# Patient Record
Sex: MALE | Race: OTHER | HISPANIC OR LATINO | Employment: UNEMPLOYED | URBAN - METROPOLITAN AREA
[De-identification: names, ages, dates, MRNs, and addresses within clinical notes are randomized per-mention and may not be internally consistent; named-entity substitution may affect disease eponyms.]

---
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
## 2021-01-04 DIAGNOSIS — Z23 NEED FOR INFLUENZA VACCINATION: Primary | ICD-10-CM

## 2021-01-05 ENCOUNTER — OFFICE VISIT (OUTPATIENT)
Dept: FAMILY MEDICINE CLINIC | Facility: CLINIC | Age: 21
End: 2021-01-05
Payer: COMMERCIAL

## 2021-01-05 VITALS
OXYGEN SATURATION: 99 % | SYSTOLIC BLOOD PRESSURE: 110 MMHG | HEIGHT: 68 IN | HEART RATE: 66 BPM | DIASTOLIC BLOOD PRESSURE: 70 MMHG | WEIGHT: 116.8 LBS | BODY MASS INDEX: 17.7 KG/M2 | TEMPERATURE: 97.4 F

## 2021-01-05 DIAGNOSIS — Z00.00 HEALTH MAINTENANCE EXAMINATION: Primary | ICD-10-CM

## 2021-01-05 DIAGNOSIS — K29.50 CHRONIC GASTRITIS WITHOUT BLEEDING, UNSPECIFIED GASTRITIS TYPE: ICD-10-CM

## 2021-01-05 PROCEDURE — 1036F TOBACCO NON-USER: CPT | Performed by: NURSE PRACTITIONER

## 2021-01-05 PROCEDURE — 3725F SCREEN DEPRESSION PERFORMED: CPT | Performed by: NURSE PRACTITIONER

## 2021-01-05 PROCEDURE — 3008F BODY MASS INDEX DOCD: CPT | Performed by: NURSE PRACTITIONER

## 2021-01-05 PROCEDURE — 99395 PREV VISIT EST AGE 18-39: CPT | Performed by: NURSE PRACTITIONER

## 2021-01-05 RX ORDER — OMEPRAZOLE 20 MG/1
20 CAPSULE, DELAYED RELEASE ORAL
Qty: 90 CAPSULE | Refills: 3 | Status: SHIPPED | OUTPATIENT
Start: 2021-01-05 | End: 2021-03-05 | Stop reason: SDUPTHER

## 2021-01-05 NOTE — PROGRESS NOTES
Subjective     Jenny Rosario is a 24 y o   male and is here for routine health maintenance  The patient reports problems - having stomach  problems that come and go   ON bad days states lack of appetitie   States certain foods this more with causing abdominal pain  Occasional diarrhea   ON a bad day eating will make it worse  ON good days can eat normal   Happening several times a week  All this started off and on for 1 year  Becoming more frequent  Having issues of clearing throat at night an dfirst thing in am   History of Present Illness     HPI  See above   Well Adult Physical   Patient here for a comprehensive physical exam       Diet and Physical Activity  Diet: poor diet  Weight concerns: None, patient's BMI is between 18 5-24 9  Exercise: never      Depression Screen  PHQ-9 Depression Screening    PHQ-9:   Frequency of the following problems over the past two weeks:      Little interest or pleasure in doing things: 0 - not at all  Feeling down, depressed, or hopeless: 0 - not at all  PHQ-2 Score: 0          General Health  Hearing: Normal:  bilateral  Vision: no vision problems  Dental: no dental visits for >1 year              Smoker no   Annual screening with low-dose helical computed tomography (CT) for patients age 54 to 76 years with history of smoking at least 30 pack-years and, if a former smoker, had quit within the previous 15 years      The following portions of the patient's history were reviewed and updated as appropriate: allergies, current medications, past family history, past medical history, past social history, past surgical history and problem list     Review of Systems     Review of Systems   Constitutional: Negative for activity change, appetite change, fatigue, fever and unexpected weight change  HENT: Negative for congestion, dental problem, postnasal drip, rhinorrhea and sneezing  Eyes: Negative for discharge and visual disturbance     Respiratory: Negative for cough, chest tightness and wheezing  Cardiovascular: Negative for chest pain  Gastrointestinal: Positive for abdominal pain, diarrhea and nausea  Negative for constipation and vomiting  Endocrine: Negative for polydipsia and polyuria  Genitourinary: Negative for dysuria, frequency and urgency  Musculoskeletal: Negative for arthralgias  Skin: Negative for rash  Allergic/Immunologic: Negative for environmental allergies and food allergies  Neurological: Negative for dizziness, light-headedness and headaches  Hematological: Negative for adenopathy  Psychiatric/Behavioral: Negative for behavioral problems and sleep disturbance  Past Medical History     History reviewed  No pertinent past medical history  Past Surgical History     History reviewed  No pertinent surgical history      Social History     Social History     Socioeconomic History    Marital status: Single     Spouse name: None    Number of children: None    Years of education: None    Highest education level: None   Occupational History    None   Social Needs    Financial resource strain: None    Food insecurity     Worry: None     Inability: None    Transportation needs     Medical: None     Non-medical: None   Tobacco Use    Smoking status: Never Smoker    Smokeless tobacco: Never Used   Substance and Sexual Activity    Alcohol use: Not Currently    Drug use: Never    Sexual activity: Not Currently   Lifestyle    Physical activity     Days per week: None     Minutes per session: None    Stress: None   Relationships    Social connections     Talks on phone: None     Gets together: None     Attends Yazidi service: None     Active member of club or organization: None     Attends meetings of clubs or organizations: None     Relationship status: None    Intimate partner violence     Fear of current or ex partner: None     Emotionally abused: None     Physically abused: None     Forced sexual activity: None   Other Topics Concern    None   Social History Narrative    None       Family History     History reviewed  No pertinent family history  Current Medications       Current Outpatient Medications:     acetaminophen (TYLENOL) 500 mg tablet, Take 1 tablet (500 mg total) by mouth every 6 (six) hours as needed for mild pain, Disp: 30 tablet, Rfl: 2     Allergies     No Known Allergies    Objective     /70 (BP Location: Left arm, Patient Position: Sitting, Cuff Size: Standard)   Pulse 66   Temp (!) 97 4 °F (36 3 °C) (Temporal)   Ht 5' 8" (1 727 m)   Wt 53 kg (116 lb 12 8 oz)   SpO2 99%   BMI 17 76 kg/m²      Physical Exam  Constitutional:       Appearance: Normal appearance  He is well-developed and normal weight  HENT:      Head: Normocephalic  Right Ear: External ear normal       Left Ear: External ear normal    Eyes:      General:         Right eye: No discharge  Left eye: No discharge  Conjunctiva/sclera: Conjunctivae normal       Pupils: Pupils are equal, round, and reactive to light  Neck:      Musculoskeletal: Normal range of motion and neck supple  Thyroid: No thyromegaly  Cardiovascular:      Rate and Rhythm: Normal rate and regular rhythm  Heart sounds: Normal heart sounds  No murmur  Pulmonary:      Effort: Pulmonary effort is normal       Breath sounds: Normal breath sounds  Abdominal:      General: Bowel sounds are normal       Palpations: Abdomen is soft  Tenderness: There is no abdominal tenderness  Genitourinary:     Penis: Normal     Musculoskeletal: Normal range of motion  Lymphadenopathy:      Cervical: No cervical adenopathy  Skin:     General: Skin is warm  Findings: No rash  Neurological:      Mental Status: He is alert and oriented to person, place, and time     Psychiatric:         Behavior: Behavior normal            No exam data present    Health Maintenance     Health Maintenance   Topic Date Due    HIV Screening  07/13/2016    BMI: Followup Plan  07/13/2019    Depression Screening PHQ  04/27/2021    Influenza Vaccine (1) 06/30/2021 (Originally 9/1/2020)    Annual Physical  04/27/2021    BMI: Adult  01/05/2022    DTaP,Tdap,and Td Vaccines (6 - Td) 10/24/2022    Pneumococcal Vaccine: Pediatrics (0 to 5 Years) and At-Risk Patients (6 to 59 Years)  Completed    HIB Vaccine  Completed    Hepatitis B Vaccine  Completed    IPV Vaccine  Completed    Hepatitis A Vaccine  Completed    HPV Vaccine  Completed    Meningococcal ACWY Vaccine  Aged Out     Immunization History   Administered Date(s) Administered    DTaP 2001, 05/01/2002, 11/12/2002, 09/12/2005    HPV 05/04/2015    HPV Quadrivalent 05/04/2015    HPV9 01/14/2014    Hep A, ped/adol, 2 dose 11/30/2006, 06/12/2007    Hep B, Adolescent or Pediatric 2001, 02/07/2002    Hep B, adult 2001    Hepatitis A 11/30/2006, 06/12/2007    HiB 05/01/2002, 11/12/2002    Hib (PRP-T) 2001    IPV 2001, 08/26/2002, 09/12/2005    MMR 08/26/2002, 08/08/2005    Meningococcal MCV4P 10/24/2012    Pneumococcal Conjugate 13-Valent 2001, 05/01/2002, 11/12/2002    Tdap 10/24/2012    Varicella 08/26/2002, 09/06/2007       Assessment/Plan     There are no diagnoses linked to this encounter  1  Healthy male exam   2  Patient Counseling:   · Nutrition: Stressed importance of a well balanced diet, moderation of sodium/saturated fat, caloric balance and sufficient intake of fiber  · Exercise: Stressed the importance of regular exercise with a goal of 150 minutes per week  · Dental Health: Discussed daily flossing and brushing and regular dental visits     · Immunizations reviewed  · Discussed benefits of screening   · Discussed the patient's BMI with him  The BMI is in the acceptable range   ·   Follow up as needed for acute illness  BMI Counseling: Body mass index is 17 76 kg/m²  The BMI is below normal  Dietary education for weight gain was provided  Patient Instructions     Diet for Stomach Ulcers and Gastritis   AMBULATORY CARE:   A diet for stomach ulcers and gastritis  is a meal plan that limits foods that irritate your stomach  Certain foods may worsen symptoms such as stomach pain, bloating, heartburn, or indigestion  Foods to limit or avoid:  You may need to avoid acidic, spicy, or high-fat foods  Not all foods affect everyone the same way  You will need to learn which foods worsen your symptoms and limit those foods  The following are some foods that may worsen ulcer or gastritis symptoms:  · Beverages:      ? Whole milk and chocolate milk    ? Hot cocoa and cola    ? Any beverage with caffeine    ? Regular and decaffeinated coffee    ? Peppermint and spearmint tea    ? Green and black tea, with or without caffeine    ? Orange and grapefruit juices    ? Drinks that contain alcohol    · Spices and seasonings:      ? Black and red pepper    ? Chili powder    ? Mustard seed and nutmeg    · Other foods:      ? Dairy foods made from whole milk or cream    ? Chocolate    ? Spicy or strongly flavored cheeses, such as jalapeno or black pepper    ? Highly seasoned, high-fat meats, such as sausage, salami, martinez, ham, and cold cuts    ? Hot chiles and peppers    ? Tomato products, such as tomato paste, tomato sauce, or tomato juice    Foods to include:  Eat a variety of healthy foods from all the food groups  Eat fruits, vegetables, whole grains, and fat-free or low-fat dairy foods  Whole grains include whole-wheat breads, cereals, pasta, and brown rice  Choose lean meats, poultry (chicken and turkey), fish, beans, eggs, and nuts  A healthy meal plan is low in unhealthy fats, salt, and added sugar  Healthy fats include olive oil and canola oil  Ask your dietitian for more information about a healthy diet  Other helpful guidelines:   · Do not eat right before bedtime  Stop eating at least 2 hours before bedtime  · Eat small, frequent meals    Your stomach may tolerate small, frequent meals better than large meals  © Copyright [address] Information is for End User's use only and may not be sold, redistributed or otherwise used for commercial purposes  All illustrations and images included in CareNotes® are the copyrighted property of [employer]  or Mumtaz Kumar  The above information is an  only  It is not intended as medical advice for individual conditions or treatments  Talk to your doctor, nurse or pharmacist before following any medical regimen to see if it is safe and effective for you        Vijay Reynaga

## 2021-01-05 NOTE — PATIENT INSTRUCTIONS
Diet for Stomach Ulcers and Gastritis   AMBULATORY CARE:   A diet for stomach ulcers and gastritis  is a meal plan that limits foods that irritate your stomach  Certain foods may worsen symptoms such as stomach pain, bloating, heartburn, or indigestion  Foods to limit or avoid:  You may need to avoid acidic, spicy, or high-fat foods  Not all foods affect everyone the same way  You will need to learn which foods worsen your symptoms and limit those foods  The following are some foods that may worsen ulcer or gastritis symptoms:  · Beverages:      ? Whole milk and chocolate milk    ? Hot cocoa and cola    ? Any beverage with caffeine    ? Regular and decaffeinated coffee    ? Peppermint and spearmint tea    ? Green and black tea, with or without caffeine    ? Orange and grapefruit juices    ? Drinks that contain alcohol    · Spices and seasonings:      ? Black and red pepper    ? Chili powder    ? Mustard seed and nutmeg    · Other foods:      ? Dairy foods made from whole milk or cream    ? Chocolate    ? Spicy or strongly flavored cheeses, such as jalapeno or black pepper    ? Highly seasoned, high-fat meats, such as sausage, salami, martinez, ham, and cold cuts    ? Hot chiles and peppers    ? Tomato products, such as tomato paste, tomato sauce, or tomato juice    Foods to include:  Eat a variety of healthy foods from all the food groups  Eat fruits, vegetables, whole grains, and fat-free or low-fat dairy foods  Whole grains include whole-wheat breads, cereals, pasta, and brown rice  Choose lean meats, poultry (chicken and turkey), fish, beans, eggs, and nuts  A healthy meal plan is low in unhealthy fats, salt, and added sugar  Healthy fats include olive oil and canola oil  Ask your dietitian for more information about a healthy diet  Other helpful guidelines:   · Do not eat right before bedtime  Stop eating at least 2 hours before bedtime  · Eat small, frequent meals    Your stomach may tolerate small, frequent meals better than large meals  © Copyright [address] Information is for End User's use only and may not be sold, redistributed or otherwise used for commercial purposes  All illustrations and images included in CareNotes® are the copyrighted property of [employer]  or Mumtaz Kumar  The above information is an  only  It is not intended as medical advice for individual conditions or treatments  Talk to your doctor, nurse or pharmacist before following any medical regimen to see if it is safe and effective for you

## 2021-03-05 ENCOUNTER — OFFICE VISIT (OUTPATIENT)
Dept: FAMILY MEDICINE CLINIC | Facility: CLINIC | Age: 21
End: 2021-03-05
Payer: COMMERCIAL

## 2021-03-05 VITALS
BODY MASS INDEX: 16.6 KG/M2 | DIASTOLIC BLOOD PRESSURE: 70 MMHG | HEART RATE: 77 BPM | WEIGHT: 109.2 LBS | TEMPERATURE: 97.5 F | SYSTOLIC BLOOD PRESSURE: 110 MMHG | OXYGEN SATURATION: 98 % | RESPIRATION RATE: 18 BRPM

## 2021-03-05 DIAGNOSIS — K29.50 CHRONIC GASTRITIS WITHOUT BLEEDING, UNSPECIFIED GASTRITIS TYPE: ICD-10-CM

## 2021-03-05 DIAGNOSIS — K21.9 GASTROESOPHAGEAL REFLUX DISEASE WITHOUT ESOPHAGITIS: Primary | ICD-10-CM

## 2021-03-05 PROCEDURE — 3725F SCREEN DEPRESSION PERFORMED: CPT | Performed by: NURSE PRACTITIONER

## 2021-03-05 PROCEDURE — 3008F BODY MASS INDEX DOCD: CPT | Performed by: NURSE PRACTITIONER

## 2021-03-05 PROCEDURE — 1036F TOBACCO NON-USER: CPT | Performed by: NURSE PRACTITIONER

## 2021-03-05 PROCEDURE — 99213 OFFICE O/P EST LOW 20 MIN: CPT | Performed by: NURSE PRACTITIONER

## 2021-03-05 RX ORDER — OMEPRAZOLE 20 MG/1
20 CAPSULE, DELAYED RELEASE ORAL
Qty: 30 CAPSULE | Refills: 3 | Status: SHIPPED | OUTPATIENT
Start: 2021-03-05

## 2021-03-05 RX ORDER — FAMOTIDINE 40 MG/1
40 TABLET, FILM COATED ORAL
Qty: 30 TABLET | Refills: 3 | Status: SHIPPED | OUTPATIENT
Start: 2021-03-05 | End: 2022-03-08 | Stop reason: ALTCHOICE

## 2021-03-05 NOTE — PROGRESS NOTES
Assessment/Plan:         Diagnoses and all orders for this visit:    Gastroesophageal reflux disease without esophagitis  -     famotidine (PEPCID) 40 MG tablet; Take 1 tablet (40 mg total) by mouth daily at bedtime as needed for heartburn  -     Ambulatory referral to Gastroenterology; Future    Chronic gastritis without bleeding, unspecified gastritis type  -     famotidine (PEPCID) 40 MG tablet; Take 1 tablet (40 mg total) by mouth daily at bedtime as needed for heartburn  -     omeprazole (PriLOSEC) 20 mg delayed release capsule; Take 1 capsule (20 mg total) by mouth daily before breakfast  -     Ambulatory referral to Gastroenterology; Future          Subjective:      Patient ID: Maylin Mccormick is a 24 y o  male  HPI    The following portions of the patient's history were reviewed and updated as appropriate: allergies, current medications, past family history, past medical history, past social history, past surgical history and problem list     Review of Systems   Constitutional: Positive for appetite change  Negative for activity change, fatigue and fever  HENT: Positive for congestion  Negative for sore throat  Respiratory: Negative for cough and chest tightness  Cardiovascular: Negative for chest pain  Gastrointestinal: Positive for abdominal pain, diarrhea and nausea  Negative for constipation, rectal pain and vomiting  Gagging with eating at times      Gets pain with food sometimes  Pain is central around umbilicus   Diarrhea off and on but rarely    Neurological: Negative for dizziness and light-headedness  Objective:  Vitals:    03/05/21 1425   BP: 110/70   BP Location: Left arm   Patient Position: Sitting   Cuff Size: Standard   Pulse: 77   Resp: 18   Temp: 97 5 °F (36 4 °C)   TempSrc: Temporal   SpO2: 98%   Weight: 49 5 kg (109 lb 3 2 oz)      Physical Exam  Vitals signs reviewed  Constitutional:       Appearance: Normal appearance     Abdominal:       Neurological: Mental Status: He is alert

## 2021-05-14 ENCOUNTER — OFFICE VISIT (OUTPATIENT)
Dept: URGENT CARE | Age: 21
End: 2021-05-14
Payer: COMMERCIAL

## 2021-05-14 VITALS
TEMPERATURE: 99.3 F | OXYGEN SATURATION: 97 % | WEIGHT: 108 LBS | BODY MASS INDEX: 16 KG/M2 | HEART RATE: 79 BPM | DIASTOLIC BLOOD PRESSURE: 62 MMHG | RESPIRATION RATE: 16 BRPM | SYSTOLIC BLOOD PRESSURE: 110 MMHG | HEIGHT: 69 IN

## 2021-05-14 DIAGNOSIS — H00.024 HORDEOLUM INTERNUM OF LEFT UPPER EYELID: Primary | ICD-10-CM

## 2021-05-14 PROCEDURE — 99213 OFFICE O/P EST LOW 20 MIN: CPT | Performed by: PHYSICIAN ASSISTANT

## 2021-05-14 RX ORDER — OFLOXACIN 3 MG/ML
1 SOLUTION/ DROPS OPHTHALMIC 4 TIMES DAILY
Qty: 5 ML | Refills: 0 | Status: SHIPPED | OUTPATIENT
Start: 2021-05-14 | End: 2022-03-08 | Stop reason: ALTCHOICE

## 2021-05-14 NOTE — PATIENT INSTRUCTIONS
[facility]  Address: [address] [place] [address] [place], [place], [address]  Phone: [phone]    Continue to monitor symptoms  If new or worsening symptoms develop, go immediately to Er  Drink plenty of fluids  Follow up with Eye Doctor this week  [place]   WHAT YOU NEED TO KNOW:   A stye is a lump on the edge or inside of your eyelid caused by an infection  A stye can form on your upper or lower eyelid  It usually goes away in 2 to 4 days  DISCHARGE INSTRUCTIONS:   Medicines:   · Antibiotic medicine: This is given as an ointment to put into your eye  It is used to fight an infection caused by bacteria  Use as directed  · Take your medicine as directed  Contact your healthcare provider if you think your medicine is not helping or if you have side effects  Tell him of her if you are allergic to any medicine  Keep a list of the medicines, vitamins, and herbs you take  Include the amounts, and when and why you take them  Bring the list or the pill bottles to follow-up visits  Carry your medicine list with you in case of an emergency  Follow up with your healthcare provider as directed:  Write down your questions so you remember to ask them during your visits  Self-care:   · Use warm compresses: This will help decrease swelling and pain  Wet a clean washcloth with warm water and place it on your eye for 10 to 15 minutes, 3 to 4 times each day or as directed  · Keep your hands away from your eye: This helps to prevent the spread of the infection to other parts of the eye  Wash your hands often with soap and dry with a clean towel  Do not squeeze the stye  · Do not use eye makeup:  Do not wear eye makeup while you have a stye  Eye makeup may carry bacteria and cause another stye  Throw away eye makeup and brushes used to apply the makeup  Use new eye makeup after the stye has gone away  Do not share eye makeup with others      · Prevent another stye:  Wash your face and clean your eyelashes every day  Remove eye makeup with makeup remover  This helps to completely remove eye makeup without heavy rubbing  Contact your healthcare provider if:   · You have redness and discharge around your eye, and your eye pain is getting worse  · Your vision changes  · The stye has not gone away within 7 days  · The stye comes back within a short period of time after treatment  · You have questions or concerns about your condition or care  © Copyright [address] Information is for End User's use only and may not be sold, redistributed or otherwise used for commercial purposes  All illustrations and images included in CareNotes® are the copyrighted property of A D A M , Inc  or [address] [place], [place] [address]rene   The above information is an  only  It is not intended as medical advice for individual conditions or treatments  Talk to your doctor, nurse or pharmacist before following any medical regimen to see if it is safe and effective for you

## 2021-05-14 NOTE — PROGRESS NOTES
[facility]        NAME: Meggan Vargas is a 24 y o  male  : 2000    MRN: [id number]  DATE: May 14, 2021  TIME: 4:16 PM    Assessment and Plan   Hordeolum internum of left upper eyelid [H00 024]  1  Hordeolum internum of left upper eyelid  ofloxacin (OCUFLOX) 0 3 % ophthalmic solution     Appears as though pt has stye  Educated on lid hygeine and started on abx drops  Provided pt info for optho  Regarding episode of light headedness that is currently resolved, physical exam unremarkable  Resolved rapidly  Educated pt to f/u with PCP for further eval   Educated pt on signs and sx of worsening condition and indications to f/u or go to ER  Patient Instructions       Continue to monitor symptoms  If new or worsening symptoms develop, go immediately to Er  Drink plenty of fluids  Follow up with Family Doctor this week  Chief Complaint     Chief Complaint   Patient presents with    Eye Pain     pt states outside yesterday something hit him in the left eyelid  +swelling to left upper eyelid  +pain with blinking   No vision changes   +slight nausea and dizziness today          History of Present Illness       Eye Pain   The left eye is affected  This is a new problem  The current episode started today  The problem occurs constantly  The problem has been gradually worsening  Injury mechanism: Pt states he felt dust fly in his eye and he got it out but shortly after his L upper eyelid started to swell  No pain to eye itself  No FB sensation  Pt states pain is all in eyelid  The pain is at a severity of 5/10  There is no known exposure to pink eye  Associated symptoms include eye redness and itching  Pertinent negatives include no blurred vision, eye discharge, double vision, fever, foreign body sensation, nausea, photophobia, vomiting or weakness  He has tried nothing for the symptoms  The treatment provided no relief       Pt states when he woke up from a nap about an hour ago when he stood up he felt light headed and nauseous for a few moments  States this happens occasionally  Resolved now  Pt states he has been treated by PCP for anxiety and instructed to stop drinking caffeine and drink more water  Pt stopped drinking caffeine but states he drinks 1 bottle of water or less a day  Pt states he drinks juice or soda  Pt currently has no sx  No chest pain, no palpitations, no dizziness, no fatigue, no neausea  Review of Systems   Review of Systems   Constitutional: Negative for chills, diaphoresis, fatigue and fever  HENT: Negative for congestion, hearing loss, postnasal drip, rhinorrhea, sinus pressure, sneezing, sore throat and trouble swallowing  Eyes: Positive for pain, redness and itching  Negative for blurred vision, double vision, photophobia, discharge and visual disturbance  Respiratory: Negative for chest tightness, shortness of breath and wheezing  Cardiovascular: Negative  Negative for chest pain and palpitations  Gastrointestinal: Negative for abdominal distention, abdominal pain, diarrhea, nausea and vomiting  Endocrine: Negative  Genitourinary: Negative for dysuria  Musculoskeletal: Negative  Negative for back pain, neck pain and neck stiffness  Skin: Negative for pallor and rash  Allergic/Immunologic: Negative  Neurological: Positive for light-headedness (resolved)  Negative for tremors, seizures, syncope, weakness, numbness and headaches  Hematological: Negative  Psychiatric/Behavioral: Negative            Current Medications       Current Outpatient Medications:     acetaminophen (TYLENOL) 500 mg tablet, Take 1 tablet (500 mg total) by mouth every 6 (six) hours as needed for mild pain, Disp: 30 tablet, Rfl: 2    famotidine (PEPCID) 40 MG tablet, Take 1 tablet (40 mg total) by mouth daily at bedtime as needed for heartburn, Disp: 30 tablet, Rfl: 3    omeprazole (PriLOSEC) 20 mg delayed release capsule, Take 1 capsule (20 mg total) by mouth daily before breakfast, Disp: 30 capsule, Rfl: 3    ofloxacin (OCUFLOX) 0 3 % ophthalmic solution, Administer 1 drop into the left eye 4 (four) times a day, Disp: 5 mL, Rfl: 0    Current Allergies     Allergies as of 05/14/2021    (No Known Allergies)            The following portions of the patient's history were reviewed and updated as appropriate: allergies, current medications, past family history, past medical history, past social history, past surgical history and problem list      History reviewed  No pertinent past medical history  History reviewed  No pertinent surgical history  History reviewed  No pertinent family history  Medications have been verified  Objective   /62 Comment: manual  Pulse 79   Temp 99 3 °F (37 4 °C)   Resp 16   Ht 5' 9" (1 753 m)   Wt 49 kg (108 lb)   SpO2 97%   BMI 15 95 kg/m²        Physical Exam     Physical Exam  Vitals signs and nursing note reviewed  Constitutional:       General: He is not in acute distress  Appearance: Normal appearance  He is well-developed  He is not ill-appearing or diaphoretic  HENT:      Head: Normocephalic and atraumatic  Right Ear: Tympanic membrane, ear canal and external ear normal       Left Ear: Tympanic membrane, ear canal and external ear normal       Nose: Nose normal       Mouth/Throat:      Pharynx: No oropharyngeal exudate  Eyes:      General: Lids are everted, no foreign bodies appreciated  Vision grossly intact  Gaze aligned appropriately  No allergic shiner, visual field deficit or scleral icterus  Right eye: No foreign body, discharge or hordeolum  Left eye: Hordeolum (L upper eyelid, internal) present  No foreign body or discharge  Extraocular Movements: Extraocular movements intact  Right eye: Normal extraocular motion and no nystagmus  Left eye: Normal extraocular motion and no nystagmus        Conjunctiva/sclera: Conjunctivae normal       Right eye: Right conjunctiva is not injected  No chemosis, exudate or hemorrhage  Left eye: Left conjunctiva is not injected  No chemosis, exudate or hemorrhage  Pupils: Pupils are equal, round, and reactive to light  Neck:      Musculoskeletal: Normal range of motion and neck supple  Cardiovascular:      Rate and Rhythm: Normal rate and regular rhythm  Heart sounds: Normal heart sounds  Pulmonary:      Effort: Pulmonary effort is normal  No respiratory distress  Breath sounds: Normal breath sounds  No wheezing, rhonchi or rales  Musculoskeletal:         General: No deformity  Skin:     General: Skin is warm  Capillary Refill: Capillary refill takes less than 2 seconds  Coloration: Skin is not pale  Findings: No rash  Neurological:      General: No focal deficit present  Mental Status: He is alert and oriented to person, place, and time  Mental status is at baseline  GCS: GCS eye subscore is 4  GCS verbal subscore is 5  GCS motor subscore is 6  Cranial Nerves: Cranial nerves are intact  No cranial nerve deficit or facial asymmetry  Sensory: Sensation is intact  No sensory deficit  Motor: Motor function is intact  No weakness, tremor, abnormal muscle tone or pronator drift  Coordination: Coordination is intact  Romberg sign negative  Coordination normal  Finger-Nose-Finger Test and Heel to [facility] Test normal       Gait: Gait is intact   Gait normal

## 2021-07-06 ENCOUNTER — OFFICE VISIT (OUTPATIENT)
Dept: FAMILY MEDICINE CLINIC | Facility: CLINIC | Age: 21
End: 2021-07-06
Payer: COMMERCIAL

## 2021-07-06 DIAGNOSIS — Z20.822 EXPOSURE TO COVID-19 VIRUS: Primary | ICD-10-CM

## 2021-07-06 PROCEDURE — U0005 INFEC AGEN DETEC AMPLI PROBE: HCPCS | Performed by: NURSE PRACTITIONER

## 2021-07-06 PROCEDURE — 1036F TOBACCO NON-USER: CPT | Performed by: NURSE PRACTITIONER

## 2021-07-06 PROCEDURE — U0003 INFECTIOUS AGENT DETECTION BY NUCLEIC ACID (DNA OR RNA); SEVERE ACUTE RESPIRATORY SYNDROME CORONAVIRUS 2 (SARS-COV-2) (CORONAVIRUS DISEASE [COVID-19]), AMPLIFIED PROBE TECHNIQUE, MAKING USE OF HIGH THROUGHPUT TECHNOLOGIES AS DESCRIBED BY CMS-2020-01-R: HCPCS | Performed by: NURSE PRACTITIONER

## 2021-07-06 PROCEDURE — 99213 OFFICE O/P EST LOW 20 MIN: CPT | Performed by: NURSE PRACTITIONER

## 2021-07-06 NOTE — PROGRESS NOTES
COVID-19 Outpatient Progress Note    Assessment/Plan:    Problem List Items Addressed This Visit     None      Visit Diagnoses     Exposure to COVID-19 virus    -  Primary    Relevant Orders    Novel Coronavirus (Covid-19),PCR [facility] - Collected at   [facility] or [facility]         Disposition:     I referred patient to a [facility] for further evaluation  I have spent 15 minutes directly with the patient  Greater than 50% of this time was spent in counseling/coordination of care regarding: diagnostic results, instructions for management, patient and family education, importance of treatment compliance and risk factor reductions  Encounter provider DearKURT Stewart    Provider located at [facility] AT [address] [facility]  [address] [place] [id number]    Recent Visits  No visits were found meeting these conditions  Showing recent visits within past 7 days and meeting all other requirements  Today's Visits  Date Type Provider Dept   07/06/21 Office Visit Dearconchita Macedo, [facility]   Showing today's visits and meeting all other requirements  Future Appointments  No visits were found meeting these conditions  Showing future appointments within next 150 days and meeting all other requirements     This virtual check-in was done via [employer] and patient was informed that this is a secure, HIPAA-compliant platform  He agrees to proceed  Patient agrees to participate in a virtual check in via telephone or video visit instead of presenting to the office to address urgent/immediate medical needs  Patient is aware this is a billable service  After connecting through [facility], the patient was identified by name and date of birth  Jaren Brito was informed that this was a telemedicine visit and that the exam was being conducted confidentially over secure lines  My office door was closed  No one else was in the room   Jaren Brito acknowledged consent and understanding of privacy and security of the telemedicine visit  I informed the patient that I have reviewed his record in Epic and presented the opportunity for him to ask any questions regarding the visit today  The patient agreed to participate  Subjective:   Rick Andrew is a 24 y o  male who is concerned about COVID-19  Patient is currently asymptomatic  Patient's symptoms include chills, fatigue, malaise, nasal congestion, cough, myalgias and headache  Patient denies fever, rhinorrhea, sore throat, anosmia, loss of taste, shortness of breath, chest tightness, abdominal pain, nausea, vomiting and diarrhea  Date of symptom onset: 7/4/2021    Exposure:   Contact with a person who is under investigation (PUI) for or who is positive for COVID-19 within the last 14 days?: No    Hospitalized recently for fever and/or lower respiratory symptoms?: No      Currently a healthcare worker that is involved in direct patient care?: No      Works in a special setting where the risk of COVID-19 transmission may be high? (this may include long-term care, correctional and snf facilities; homeless shelters; assisted-living facilities and group homes ): No      Resident in a special setting where the risk of COVID-19 transmission may be high? (this may include long-term care, correctional and snf facilities; homeless shelters; assisted-living facilities and group homes ): No      Lab Results   Component Value Date    SARSCOV2 Negative 05/07/2020     History reviewed  No pertinent past medical history  History reviewed  No pertinent surgical history    Current Outpatient Medications   Medication Sig Dispense Refill    acetaminophen (TYLENOL) 500 mg tablet Take 1 tablet (500 mg total) by mouth every 6 (six) hours as needed for mild pain (Patient not taking: Reported on 7/6/2021) 30 tablet 2    famotidine (PEPCID) 40 MG tablet Take 1 tablet (40 mg total) by mouth daily at bedtime as needed for heartburn (Patient not taking: Reported on 7/6/2021) 30 tablet 3    ofloxacin (OCUFLOX) 0 3 % ophthalmic solution Administer 1 drop into the left eye 4 (four) times a day (Patient not taking: Reported on 7/6/2021) 5 mL 0    omeprazole (PriLOSEC) 20 mg delayed release capsule Take 1 capsule (20 mg total) by mouth daily before breakfast (Patient not taking: Reported on 7/6/2021) 30 capsule 3     No current facility-administered medications for this visit  No Known Allergies    Review of Systems   Constitutional: Positive for chills and fatigue  Negative for fever  HENT: Positive for congestion  Negative for rhinorrhea and sore throat  Respiratory: Positive for cough  Negative for chest tightness and shortness of breath  Gastrointestinal: Negative for abdominal pain, diarrhea, nausea and vomiting  Musculoskeletal: Positive for myalgias  Neurological: Positive for headaches  Objective: There were no vitals filed for this visit  Physical Exam  Constitutional:       Appearance: He is ill-appearing  Pulmonary:      Effort: Pulmonary effort is normal  No respiratory distress (No dyspnea noted wile conversing)  Neurological:      Mental Status: He is alert and oriented to person, place, and time  Psychiatric:         Mood and Affect: Mood normal          Behavior: Behavior normal        VIRTUAL VISIT DISCLAIMER    Jacklyn Forbes acknowledges that he has consented to an online visit or consultation  He understands that the online visit is based solely on information provided by him, and that, in the absence of a face-to-face physical evaluation by the physician, the diagnosis he receives is both limited and provisional in terms of accuracy and completeness  This is not intended to replace a full medical face-to-face evaluation by the physician  Jacklyn Forbes understands and accepts these terms

## 2021-07-07 ENCOUNTER — TELEPHONE (OUTPATIENT)
Dept: FAMILY MEDICINE CLINIC | Facility: CLINIC | Age: 21
End: 2021-07-07

## 2021-07-14 ENCOUNTER — CONSULT (OUTPATIENT)
Dept: GASTROENTEROLOGY | Facility: CLINIC | Age: 21
End: 2021-07-14
Payer: COMMERCIAL

## 2021-07-14 VITALS
TEMPERATURE: 97.8 F | HEIGHT: 69 IN | BODY MASS INDEX: 16.97 KG/M2 | WEIGHT: 114.6 LBS | SYSTOLIC BLOOD PRESSURE: 100 MMHG | DIASTOLIC BLOOD PRESSURE: 60 MMHG | HEART RATE: 70 BPM

## 2021-07-14 DIAGNOSIS — R10.13 EPIGASTRIC PAIN: ICD-10-CM

## 2021-07-14 DIAGNOSIS — K21.9 GASTROESOPHAGEAL REFLUX DISEASE WITHOUT ESOPHAGITIS: Primary | ICD-10-CM

## 2021-07-14 DIAGNOSIS — K21.9 GASTROESOPHAGEAL REFLUX DISEASE WITHOUT ESOPHAGITIS: ICD-10-CM

## 2021-07-14 PROCEDURE — 99242 OFF/OP CONSLTJ NEW/EST SF 20: CPT | Performed by: PHYSICIAN ASSISTANT

## 2021-07-14 PROCEDURE — 3008F BODY MASS INDEX DOCD: CPT | Performed by: NURSE PRACTITIONER

## 2021-07-14 RX ORDER — SUCRALFATE ORAL 1 G/10ML
1 SUSPENSION ORAL
Qty: 240 ML | Refills: 1 | Status: SHIPPED | OUTPATIENT
Start: 2021-07-14

## 2021-07-14 RX ORDER — SUCRALFATE ORAL 1 G/10ML
1 SUSPENSION ORAL
Qty: 10 ML | Refills: 1 | Status: SHIPPED | OUTPATIENT
Start: 2021-07-14 | End: 2021-07-14

## 2021-07-14 NOTE — PATIENT INSTRUCTIONS
EGD scheduled 09/01/21 @[facility] with Dr Chalo Barajas    Instructions given to pt by Beth Otoole in the office

## 2021-07-14 NOTE — PROGRESS NOTES
[facility] - Outpatient Consultation  Carmen Gamboa 22 y o  male MRN: [id number]  Encounter: [id number]      Chief Complaint   Patient presents with    Esophagitis     always clearing his throat    Abdominal Pain     Burning sensation in the stomach    Diarrhea       Assessment and Plan    1  Epigastric pain  The patient has had progressive epigastric pain unrelieved by omeprazole and Pepcid  He states the pain has become daily and sometimes a constant gnawing pain  He denies any ibuprofen use, nausea, vomiting, or melena  He has had no prior EGD  - continue omeprazole and pepcid  - start carafate  - avoid NSAIDs  - discussed EGD for which the patient is agreeable  - educated on and aware to call with alarm symptoms     FU after EGD    ______________________________________________________________________    History of Present Illness  Carmen Gamboa is a 22 y o  male here for consultation of  Epigastric pain  The patient has had this for a month or so however he states the pain is worsening  He states that is gotten to the point where the pain is daily and often a constant gnawing pain  He denies any other associated symptoms including nausea, vomiting, and melena  He takes acetaminophen but denies any ibuprofen use  His primary care physician did start him on omeprazole 20 mg once daily and Pepcid 40 mg once daily  The patient states the medication is ineffective  He has no other GI complaints  He states he typically has a daily formed bowel movement but as of recent he has been having 2 loose stools a day  However he denies any diarrhea or hematochezia  No prior EGD or colonoscopy  Review of Systems   Constitutional: Negative for activity change, appetite change, chills, fatigue, fever and unexpected weight change  HENT: Negative for sore throat and trouble swallowing  Respiratory: Negative for cough and choking      Gastrointestinal: Positive for abdominal pain  Negative for abdominal distention, anal bleeding, blood in stool, constipation, diarrhea, nausea, rectal pain and vomiting  Musculoskeletal: Negative for back pain and gait problem  Neurological: Negative for syncope  Psychiatric/Behavioral: Negative for confusion  Past Medical History  History reviewed  No pertinent past medical history  Past Social history  History reviewed  No pertinent surgical history  Social History     Socioeconomic History    Marital status: Single     Spouse name: Not on file    Number of children: Not on file    Years of education: Not on file    Highest education level: Not on file   Occupational History    Not on file   Tobacco Use    Smoking status: Never Smoker    Smokeless tobacco: Never Used   Vaping Use    Vaping Use: Never used   Substance and Sexual Activity    Alcohol use: Not Currently    Drug use: Never    Sexual activity: Not Currently   Other Topics Concern    Not on file   Social History Narrative    Not on file     Social Determinants of Health     Financial Resource Strain:     Difficulty of Paying Living Expenses:    Food Insecurity:     Worried About Running Out of Food in the Last Year:     [address] in the Last Year:    Transportation Needs:     Lack of Transportation (Medical):      Lack of Transportation (Non-Medical):    Physical Activity:     Days of Exercise per Week:     Minutes of Exercise per Session:    Stress:     Feeling of Stress :    Social Connections:     Frequency of Communication with Friends and Family:     Frequency of Social Gatherings with Friends and Family:     Attends Synagogue Services:     Active Member of Clubs or Organizations:     Attends Club or Organization Meetings:     Marital Status:    Intimate Partner Violence:     Fear of Current or Ex-Partner:     Emotionally Abused:     Physically Abused:     Sexually Abused:      Social History     Substance and Sexual Activity Alcohol Use Not Currently     Social History     Substance and Sexual Activity   Drug Use Never     Social History     Tobacco Use   Smoking Status Never Smoker   Smokeless Tobacco Never Used       Past Family History  History reviewed  No pertinent family history  Current Medications  Current Outpatient Medications   Medication Sig Dispense Refill    famotidine (PEPCID) 40 MG tablet Take 1 tablet (40 mg total) by mouth daily at bedtime as needed for heartburn 30 tablet 3    omeprazole (PriLOSEC) 20 mg delayed release capsule Take 1 capsule (20 mg total) by mouth daily before breakfast 30 capsule 3    acetaminophen (TYLENOL) 500 mg tablet Take 1 tablet (500 mg total) by mouth every 6 (six) hours as needed for mild pain (Patient not taking: Reported on 7/6/2021) 30 tablet 2    ofloxacin (OCUFLOX) 0 3 % ophthalmic solution Administer 1 drop into the left eye 4 (four) times a day (Patient not taking: Reported on 7/6/2021) 5 mL 0    sucralfate (CARAFATE) 1 g/10 mL suspension Take 10 mL (1 g total) by mouth 4 (four) times a day (with meals and at bedtime) 10 mL 1     No current facility-administered medications for this visit  Allergies  No Known Allergies      The following portions of the patient's history were reviewed and updated as appropriate: allergies, current medications, past medical history, past social history, past surgical history and problem list       Vitals  Vitals:    07/14/21 1144   BP: 100/60   BP Location: Left arm   Patient Position: Sitting   Cuff Size: Adult   Pulse: 70   Temp: 97 8 °F (36 6 °C)   TempSrc: Tympanic   Weight: 52 kg (114 lb 9 6 oz)   Height: 5' 9" (1 753 m)         Physical Exam  Constitutional   General appearance: Patient is seated and in no acute distress, well appearing and well nourished  Head and Face   Head and face: Normal     Eyes   Conjunctiva and lids: No erythema, swelling or discharge  Anicteric    Ears, Nose, Mouth, and Throat   Hearing: Normal  Neck: Supple, trachea midline  Pulmonary   Respiratory effort: No increased work of breathing or signs of respiratory distress  Lungs: Clear to ascultation, no wheezes, rhonchi, or rales  Cardiovascular   Heart: Regular rate and rhythm, no murmurs gallops or rubs   Examination of extremities for edema and/or varicosities: Normal     Abdomen   Abdomen: Soft, non-tender, no masses, no organomegaly  Normal bowel sounds  Musculoskeletal   Gait and station: Normal     Skin   Skin and subcutaneous tissue: Warm, dry, and intact  No visible jaundice, lesions or rashes  Psychiatric   Judgment and insight: Normal  Recent and remote memory:  Normal  Mood and affect: Normal      Results  No visits with results within 1 Day(s) from this visit  Latest known visit with results is:   Orders Only on 07/06/2021   Component Date Value    SARS-CoV-2 07/06/2021 Negative        Radiology Results  No results found  Orders  Orders Placed This Encounter   Procedures    EGD     Standing Status:   Future     Standing Expiration Date:   7/14/2022     Order Specific Question:   Requested date? Answer:   9/1/2021     Order Specific Question:   Requested Site     Answer:   [facility]     Order Specific Question:   Performing Location     Answer:   Endoscopy Dept     Order Specific Question:   Are you the performing provider? Answer:   No     Order Specific Question:   Performing Physician     Answer:   Abdifatah Carcamo [[id number]]     Order Specific Question:   Anesthesia required? Answer:   Yes     Order Specific Question:   Is this procedure associated with another Endo procedure? Answer:   No     Order Specific Question:   Are you an Ambulatory ? (No Provider Response Needed)     Answer:   Yes     Order Specific Question:   Ready to Schedule? Answer:   No     Order Specific Question:   Has there been a change from the initial order? Answer:    No

## 2021-08-19 ENCOUNTER — OFFICE VISIT (OUTPATIENT)
Dept: URGENT CARE | Age: 21
End: 2021-08-19
Payer: COMMERCIAL

## 2021-08-19 ENCOUNTER — APPOINTMENT (OUTPATIENT)
Dept: RADIOLOGY | Age: 21
End: 2021-08-19
Payer: COMMERCIAL

## 2021-08-19 VITALS
TEMPERATURE: 98 F | DIASTOLIC BLOOD PRESSURE: 67 MMHG | BODY MASS INDEX: 16.44 KG/M2 | OXYGEN SATURATION: 99 % | SYSTOLIC BLOOD PRESSURE: 124 MMHG | HEIGHT: 69 IN | HEART RATE: 77 BPM | WEIGHT: 111 LBS | RESPIRATION RATE: 17 BRPM

## 2021-08-19 DIAGNOSIS — M79.641 HAND PAIN, RIGHT: ICD-10-CM

## 2021-08-19 DIAGNOSIS — M79.641 RIGHT HAND PAIN: ICD-10-CM

## 2021-08-19 DIAGNOSIS — S63.91XA HAND SPRAIN, RIGHT, INITIAL ENCOUNTER: ICD-10-CM

## 2021-08-19 DIAGNOSIS — M79.641 HAND PAIN, RIGHT: Primary | ICD-10-CM

## 2021-08-19 PROCEDURE — 73130 X-RAY EXAM OF HAND: CPT

## 2021-08-19 PROCEDURE — 99213 OFFICE O/P EST LOW 20 MIN: CPT | Performed by: NURSE PRACTITIONER

## 2021-08-19 NOTE — PATIENT INSTRUCTIONS
Follow up with pcp   Take tylenol and motrin as needed for pain   Ice to the right hand  X-ray done today, appears normal, no obvious fx noted, healing fx of the 5th metacarpal present

## 2021-08-19 NOTE — PROGRESS NOTES
NAME: Quentin Waterman is a 22 y o  male  : 2000    MRN: [id number]    /67   Pulse 77   Temp 98 °F (36 7 °C)   Resp 17   Ht 5' 9" (1 753 m)   Wt 50 3 kg (111 lb)   SpO2 99%   BMI 16 39 kg/m²     Assessment and Plan   Hand pain, right [[id number]]  1  Hand pain, right  XR hand 3+ vw right   2  Right hand pain     3  Hand sprain, right, initial encounter         Dinorah Hutton was seen today for hand pain  Diagnoses and all orders for this visit:    Hand pain, right  -     XR hand 3+ vw right; Future    Right hand pain    Hand sprain, right, initial encounter        Patient Instructions   Patient Instructions   Follow up with pcp   Take tylenol and motrin as needed for pain   Ice to the right hand  X-ray done today, appears normal, no obvious fx noted, healing fx of the 5th metacarpal present      Proceed to the nearest ER if symptoms worsen, Follow up with your PCP  Continue to social distance, wash your hands, and wear your masks  Please continue to follow the CDC  gov guidelines daily for they are subject to change on COVID-19    Chief Complaint     Chief Complaint   Patient presents with    Hand Pain     Darrall Reeve onto R hand , 4-5th digit w/ sharp shooting pain into hand w/ opening  Hx fracture >1yr         History of Present Illness     23 yo male here today with right hand pain after a fall yesterday  He was playing around with his girlfriend and landed on there ight hand incorrectly, hx of fx about one year ago to the same hand  Has taken nothing for pain, no obvious swelling  He is able to move all digits and make a fist but is sensitive over the 4th and 5th metacarpal on exam, no open wound, no obvious deformity, denies numbness or tingling      Review of Systems   Review of Systems   Constitutional: Negative for fatigue  HENT: Negative  Musculoskeletal: Positive for arthralgias (right hand)  Negative for gait problem and joint swelling  Skin: Negative            Current Medications Current Outpatient Medications:     omeprazole (PriLOSEC) 20 mg delayed release capsule, Take 1 capsule (20 mg total) by mouth daily before breakfast, Disp: 30 capsule, Rfl: 3    sucralfate (CARAFATE) 1 g/10 mL suspension, TAKE 10 ML (1 G TOTAL) BY MOUTH 4 (FOUR) TIMES A DAY (WITH MEALS AND AT BEDTIME), Disp: 240 mL, Rfl: 1    acetaminophen (TYLENOL) 500 mg tablet, Take 1 tablet (500 mg total) by mouth every 6 (six) hours as needed for mild pain (Patient not taking: Reported on 7/6/2021), Disp: 30 tablet, Rfl: 2    famotidine (PEPCID) 40 MG tablet, Take 1 tablet (40 mg total) by mouth daily at bedtime as needed for heartburn (Patient not taking: Reported on 8/19/2021), Disp: 30 tablet, Rfl: 3    ofloxacin (OCUFLOX) 0 3 % ophthalmic solution, Administer 1 drop into the left eye 4 (four) times a day (Patient not taking: Reported on 7/6/2021), Disp: 5 mL, Rfl: 0    Current Allergies     Allergies as of 08/19/2021    (No Known Allergies)              Past Medical History:   Diagnosis Date    GERD (gastroesophageal reflux disease)        History reviewed  No pertinent surgical history  History reviewed  No pertinent family history  Medications have been verified  The following portions of the patient's history were reviewed and updated as appropriate: allergies, current medications, past family history, past medical history, past social history, past surgical history and problem list     Objective   /67   Pulse 77   Temp 98 °F (36 7 °C)   Resp 17   Ht 5' 9" (1 753 m)   Wt 50 3 kg (111 lb)   SpO2 99%   BMI 16 39 kg/m²      Physical Exam     Physical Exam  Musculoskeletal:      Right hand: Tenderness present  No swelling  Normal range of motion  Normal strength  Normal sensation  There is no disruption of two-point discrimination  Normal capillary refill  Left hand: Normal         Arms:    Skin:     General: Skin is warm        Capillary Refill: Capillary refill takes less than 2 seconds  Findings: No rash or wound  Neurological:      Mental Status: He is alert  Note: Portions of this record may have been created with voice recognition software  Occasional wrong word or "sound a like" substitutions may have occurred due to the inherent limitations of voice recognition software  Please read the chart carefully and recognize, using context, where substitutions have occurred  KURT Henderson

## 2021-08-24 ENCOUNTER — TELEPHONE (OUTPATIENT)
Dept: GASTROENTEROLOGY | Facility: MEDICAL CENTER | Age: 21
End: 2021-08-24

## 2021-08-31 ENCOUNTER — TELEPHONE (OUTPATIENT)
Dept: GASTROENTEROLOGY | Facility: MEDICAL CENTER | Age: 21
End: 2021-08-31

## 2021-09-15 ENCOUNTER — ANESTHESIA (OUTPATIENT)
Dept: ANESTHESIOLOGY | Facility: HOSPITAL | Age: 21
End: 2021-09-15

## 2021-09-15 ENCOUNTER — TELEPHONE (OUTPATIENT)
Dept: GASTROENTEROLOGY | Facility: MEDICAL CENTER | Age: 21
End: 2021-09-15

## 2021-09-15 ENCOUNTER — ANESTHESIA EVENT (OUTPATIENT)
Dept: ANESTHESIOLOGY | Facility: HOSPITAL | Age: 21
End: 2021-09-15

## 2021-09-16 NOTE — ANESTHESIA PREPROCEDURE EVALUATION
Procedure:  PRE-OP ONLY    Relevant Problems   ANESTHESIA (within normal limits)      CARDIO (within normal limits)      ENDO (within normal limits)      GI/HEPATIC   (+) Gastroesophageal reflux disease without esophagitis      /RENAL (within normal limits)      GYN (within normal limits)      HEMATOLOGY (within normal limits)      MUSCULOSKELETAL (within normal limits)      NEURO/PSYCH   (+) Anxiety   (+) Other headache syndrome      PULMONARY (within normal limits)             Anesthesia Plan  ASA Score- 2     Anesthesia Type- IV sedation with anesthesia with ASA Monitors  Additional Monitors:   Airway Plan:           Plan Factors-Exercise tolerance (METS): >4 METS  Chart reviewed  Patient summary reviewed  Patient is not a current smoker  Patient instructed to abstain from smoking on day of procedure  Patient did not smoke on day of surgery  Induction- intravenous  Postoperative Plan-     Informed Consent- Anesthetic plan and risks discussed with patient

## 2021-10-13 ENCOUNTER — HOSPITAL ENCOUNTER (EMERGENCY)
Facility: HOSPITAL | Age: 21
Discharge: HOME | End: 2021-10-13
Attending: EMERGENCY MEDICINE
Payer: COMMERCIAL

## 2021-10-13 VITALS
SYSTOLIC BLOOD PRESSURE: 142 MMHG | WEIGHT: 112.43 LBS | HEART RATE: 92 BPM | TEMPERATURE: 97.9 F | DIASTOLIC BLOOD PRESSURE: 85 MMHG | RESPIRATION RATE: 18 BRPM | BODY MASS INDEX: 16.6 KG/M2 | OXYGEN SATURATION: 100 %

## 2021-10-13 DIAGNOSIS — K21.9 GERD (GASTROESOPHAGEAL REFLUX DISEASE): Primary | ICD-10-CM

## 2021-10-13 DIAGNOSIS — J38.5 LARYNGOSPASM: ICD-10-CM

## 2021-10-13 DIAGNOSIS — Z91.14 NONCOMPLIANCE WITH MEDICATIONS: ICD-10-CM

## 2021-10-13 PROCEDURE — 99282 EMERGENCY DEPT VISIT SF MDM: CPT | Performed by: EMERGENCY MEDICINE

## 2021-10-13 PROCEDURE — 99284 EMERGENCY DEPT VISIT MOD MDM: CPT

## 2021-11-13 ENCOUNTER — APPOINTMENT (OUTPATIENT)
Dept: RADIOLOGY | Age: 21
End: 2021-11-13
Payer: COMMERCIAL

## 2021-11-13 ENCOUNTER — OFFICE VISIT (OUTPATIENT)
Dept: URGENT CARE | Age: 21
End: 2021-11-13
Payer: COMMERCIAL

## 2021-11-13 VITALS
TEMPERATURE: 98 F | HEIGHT: 70 IN | RESPIRATION RATE: 18 BRPM | HEART RATE: 77 BPM | OXYGEN SATURATION: 99 % | DIASTOLIC BLOOD PRESSURE: 60 MMHG | SYSTOLIC BLOOD PRESSURE: 125 MMHG | WEIGHT: 113 LBS | BODY MASS INDEX: 16.18 KG/M2

## 2021-11-13 DIAGNOSIS — S99.922A INJURY OF LEFT FOOT, INITIAL ENCOUNTER: ICD-10-CM

## 2021-11-13 DIAGNOSIS — S90.32XA CONTUSION OF LEFT FOOT, INITIAL ENCOUNTER: ICD-10-CM

## 2021-11-13 DIAGNOSIS — S99.922A INJURY OF LEFT FOOT, INITIAL ENCOUNTER: Primary | ICD-10-CM

## 2021-11-13 PROCEDURE — 73630 X-RAY EXAM OF FOOT: CPT

## 2021-11-13 PROCEDURE — 99213 OFFICE O/P EST LOW 20 MIN: CPT | Performed by: PHYSICIAN ASSISTANT

## 2022-03-08 ENCOUNTER — OFFICE VISIT (OUTPATIENT)
Dept: FAMILY MEDICINE CLINIC | Facility: CLINIC | Age: 22
End: 2022-03-08
Payer: COMMERCIAL

## 2022-03-08 VITALS
TEMPERATURE: 98.5 F | BODY MASS INDEX: 17.49 KG/M2 | HEART RATE: 94 BPM | HEIGHT: 68 IN | WEIGHT: 115.4 LBS | RESPIRATION RATE: 18 BRPM | DIASTOLIC BLOOD PRESSURE: 72 MMHG | SYSTOLIC BLOOD PRESSURE: 108 MMHG | OXYGEN SATURATION: 97 %

## 2022-03-08 DIAGNOSIS — Z00.00 ANNUAL PHYSICAL EXAM: Primary | ICD-10-CM

## 2022-03-08 DIAGNOSIS — Z11.59 SCREENING FOR VIRAL DISEASE: ICD-10-CM

## 2022-03-08 PROCEDURE — 99395 PREV VISIT EST AGE 18-39: CPT | Performed by: NURSE PRACTITIONER

## 2022-03-08 RX ORDER — PAROXETINE HYDROCHLORIDE 20 MG/1
20 TABLET, FILM COATED ORAL EVERY MORNING
COMMUNITY
Start: 2022-02-28

## 2022-03-08 RX ORDER — BUSPIRONE HYDROCHLORIDE 5 MG/1
5 TABLET ORAL 2 TIMES DAILY
COMMUNITY
Start: 2022-02-28

## 2022-03-08 NOTE — PROGRESS NOTES
BMI Counseling: Body mass index is 17 81 kg/m²  The BMI is below normal  Patient was advised to gain weight  Dietary education for weight gain was provided to the patient today  [address] [facility] [facility]    NAME: Akhil Reading  AGE: 22 y o  SEX: male  : 2000     DATE: 3/8/2022     Assessment and Plan:     Problem List Items Addressed This Visit     None      Visit Diagnoses     Annual physical exam    -  Primary    Screening for viral disease        Relevant Orders    Hepatitis C antibody    HIV 1/2 Antigen/Antibody (4th Generation) w Reflex [facility]    Body mass index (BMI) less than 19 in adult              Immunizations and preventive care screenings were discussed with patient today  Appropriate education was printed on patient's after visit summary  Counseling:  Alcohol/drug use: discussed moderation in alcohol intake, the recommendations for healthy alcohol use, and avoidance of illicit drug use  Dental Health: discussed importance of regular tooth brushing, flossing, and dental visits  Injury prevention: discussed safety/seat belts, safety helmets, smoke detectors, carbon dioxide detectors, and smoking near bedding or upholstery  Sexual health: discussed sexually transmitted diseases, partner selection, use of condoms, avoidance of unintended pregnancy, and contraceptive alternatives  · Exercise: the importance of regular exercise/physical activity was discussed  Recommend exercise 3-5 times per week for at least 30 minutes  Return in about 1 year (around 3/8/2023) for Annual physical      Chief Complaint:     Chief Complaint   Patient presents with    Annual Exam    Depression    Anxiety      History of Present Illness:     Adult Annual Physical   Patient here for a comprehensive physical exam  The patient reports problems - depression and anxiety  Seeing psychiatry          Diet and Physical Activity  · Diet/Nutrition: well balanced diet and consuming 3-5 servings of fruits/vegetables daily  · Exercise: 1-2 times a week on average  Depression Screening  PHQ-2/9 Depression Screening    Little interest or pleasure in doing things: 0 - not at all  Feeling down, depressed, or hopeless: 3 - nearly every day  Trouble falling or staying asleep, or sleeping too much: 3 - nearly every day  Feeling tired or having little energy: 3 - nearly every day  Poor appetite or overeating: 3 - nearly every day  Feeling bad about yourself - or that you are a failure or have let yourself or your family down: 3 - nearly every day  Trouble concentrating on things, such as reading the newspaper or watching television: 3 - nearly every day  Moving or speaking so slowly that other people could have noticed  Or the opposite - being so fidgety or restless that you have been moving around a lot more than usual: 3 - nearly every day  Thoughts that you would be better off dead, or of hurting yourself in some way: 0 - not at all  PHQ-2 Score: 3  PHQ-2 Interpretation: POSITIVE depression screen  PHQ-9 Score: 21   PHQ-9 Interpretation: Severe depression        General Health  · Sleep: sleeps poorly  · Hearing: normal - bilateral   · Vision: no vision problems  · Dental: no dental visits for >1 year   Health  · History of STDs?: no      Review of Systems:     Review of Systems   Constitutional: Negative for activity change, appetite change, chills and fever  HENT: Negative for ear pain and sore throat  Eyes: Negative for pain and visual disturbance  Respiratory: Negative for cough and shortness of breath  Cardiovascular: Negative for chest pain and palpitations  Gastrointestinal: Negative for abdominal pain and vomiting  Genitourinary: Negative for dysuria, frequency, hematuria and urgency  Musculoskeletal: Negative for arthralgias and back pain  Skin: Negative for color change and rash     Neurological: Negative for dizziness, seizures, syncope, light-headedness and headaches  Psychiatric/Behavioral: Positive for sleep disturbance  The patient is nervous/anxious  All other systems reviewed and are negative  Past Medical History:     Past Medical History:   Diagnosis Date    GERD (gastroesophageal reflux disease)       Past Surgical History:     History reviewed  No pertinent surgical history  Social History:     Social History     Socioeconomic History    Marital status: Single     Spouse name: None    Number of children: None    Years of education: None    Highest education level: None   Occupational History    None   Tobacco Use    Smoking status: Never Smoker    Smokeless tobacco: Never Used   Vaping Use    Vaping Use: Never used   Substance and Sexual Activity    Alcohol use: Not Currently    Drug use: Yes     Types: Marijuana    Sexual activity: Yes     Partners: Female   Other Topics Concern    None   Social History Narrative    None     Social Determinants of Health     Financial Resource Strain: Not on file   Food Insecurity: Not on file   Transportation Needs: Not on file   Physical Activity: Not on file   Stress: Not on file   Social Connections: Not on file   Intimate Partner Violence: Not on file   Housing Stability: Not on file      Family History:     History reviewed  No pertinent family history     Current Medications:     Current Outpatient Medications   Medication Sig Dispense Refill    acetaminophen (TYLENOL) 500 mg tablet Take 1 tablet (500 mg total) by mouth every 6 (six) hours as needed for mild pain 30 tablet 2    omeprazole (PriLOSEC) 20 mg delayed release capsule Take 1 capsule (20 mg total) by mouth daily before breakfast 30 capsule 3    sucralfate (CARAFATE) 1 g/10 mL suspension TAKE 10 ML (1 G TOTAL) BY MOUTH 4 (FOUR) TIMES A DAY (WITH MEALS AND AT BEDTIME) 240 mL 1    busPIRone (BUSPAR) 5 mg tablet Take 5 mg by mouth 2 (two) times a day      PARoxetine (PAXIL) 20 mg tablet Take 20 mg by mouth every morning       No current facility-administered medications for this visit  Allergies:     No Known Allergies   Physical Exam:     /72 (BP Location: Left arm, Patient Position: Sitting, Cuff Size: Standard)   Pulse 94   Temp 98 5 °F (36 9 °C) (Tympanic)   Resp 18   Ht 5' 7 5" (1 715 m)   Wt 52 3 kg (115 lb 6 4 oz)   SpO2 97%   BMI 17 81 kg/m²     Physical Exam  Vitals and nursing note reviewed  Constitutional:       Appearance: Normal appearance  He is well-developed and normal weight  HENT:      Head: Normocephalic and atraumatic  Right Ear: Tympanic membrane, ear canal and external ear normal       Left Ear: Tympanic membrane, ear canal and external ear normal    Eyes:      Conjunctiva/sclera: Conjunctivae normal    Cardiovascular:      Rate and Rhythm: Normal rate and regular rhythm  Pulses: Normal pulses  Heart sounds: Normal heart sounds  No murmur heard  Pulmonary:      Effort: Pulmonary effort is normal  No respiratory distress  Breath sounds: Normal breath sounds  Abdominal:      General: Bowel sounds are normal       Palpations: Abdomen is soft  Tenderness: There is no abdominal tenderness  Musculoskeletal:         General: Normal range of motion  Cervical back: Normal range of motion and neck supple  Skin:     General: Skin is warm and dry  Neurological:      Mental Status: He is alert and oriented to person, place, and time  Psychiatric:         Attention and Perception: Attention and perception normal          Mood and Affect: Mood is anxious and depressed  Speech: Speech normal          Behavior: Behavior is cooperative  Thought Content:  Thought content normal          Cognition and Memory: Cognition and memory normal          Judgment: Judgment normal           KURT Matson   [address]

## 2022-03-08 NOTE — PATIENT INSTRUCTIONS

## 2022-03-23 ENCOUNTER — APPOINTMENT (OUTPATIENT)
Dept: LAB | Facility: HOSPITAL | Age: 22
End: 2022-03-23
Payer: COMMERCIAL

## 2022-03-23 DIAGNOSIS — Z11.59 SCREENING FOR VIRAL DISEASE: ICD-10-CM

## 2022-03-23 LAB — HCV AB SER QL: NORMAL

## 2022-03-23 PROCEDURE — 36415 COLL VENOUS BLD VENIPUNCTURE: CPT

## 2022-03-23 PROCEDURE — 87389 HIV-1 AG W/HIV-1&-2 AB AG IA: CPT

## 2022-03-23 PROCEDURE — 86803 HEPATITIS C AB TEST: CPT

## 2022-03-24 LAB — HIV 1+2 AB+HIV1 P24 AG SERPL QL IA: NORMAL

## 2022-08-04 ENCOUNTER — HOSPITAL ENCOUNTER (EMERGENCY)
Facility: HOSPITAL | Age: 22
Discharge: HOME | End: 2022-08-04
Attending: EMERGENCY MEDICINE | Admitting: EMERGENCY MEDICINE
Payer: COMMERCIAL

## 2022-08-04 VITALS
OXYGEN SATURATION: 98 % | BODY MASS INDEX: 17.59 KG/M2 | SYSTOLIC BLOOD PRESSURE: 115 MMHG | TEMPERATURE: 98 F | HEART RATE: 79 BPM | RESPIRATION RATE: 20 BRPM | WEIGHT: 113.98 LBS | DIASTOLIC BLOOD PRESSURE: 72 MMHG

## 2022-08-04 DIAGNOSIS — R10.9 ABDOMINAL PAIN: Primary | ICD-10-CM

## 2022-08-04 PROCEDURE — 99282 EMERGENCY DEPT VISIT SF MDM: CPT | Performed by: EMERGENCY MEDICINE

## 2022-08-04 PROCEDURE — 99283 EMERGENCY DEPT VISIT LOW MDM: CPT

## 2022-08-04 RX ORDER — POLYETHYLENE GLYCOL 3350 17 G/17G
17 POWDER, FOR SOLUTION ORAL DAILY
Qty: 20 EACH | Refills: 0 | Status: SHIPPED | OUTPATIENT
Start: 2022-08-04 | End: 2022-10-25

## 2022-08-04 NOTE — ED PROVIDER NOTES
History  Chief Complaint   Patient presents with    Abdominal Pain     States lower abd pain  "Feels like trapped gas" Denies n/v/d  HPI     81-year-old with no significant past medical history presents for evaluation of lower abdominal discomfort  Patient states he started to have discomfort in his left lower quadrant 2 days ago  He states today, he has had discomfort in the right side of his abdomen  He denies any pain currently  He states pain is not worse with eating or movement  He describes the discomfort that it feels like a gas bubble in his lower abdomen occasionally  Denies any fevers or chills  He denies change in appetite  Denies nausea, vomiting, or diarrhea  Patient states his last bowel movement was yesterday but was slightly less than normal   Patient states he normally goes a few days without having a bowel movement  Denies any urinary symptoms  Denies any abdominal surgeries  Prior to Admission Medications   Prescriptions Last Dose Informant Patient Reported? Taking? PARoxetine (PAXIL) 20 mg tablet  Self Yes Yes   Sig: Take 20 mg by mouth every morning   acetaminophen (TYLENOL) 500 mg tablet  Self No Yes   Sig: Take 1 tablet (500 mg total) by mouth every 6 (six) hours as needed for mild pain   busPIRone (BUSPAR) 5 mg tablet  Self Yes Yes   Sig: Take 5 mg by mouth 2 (two) times a day   omeprazole (PriLOSEC) 20 mg delayed release capsule  Self No Yes   Sig: Take 1 capsule (20 mg total) by mouth daily before breakfast   sucralfate (CARAFATE) 1 g/10 mL suspension  Self No Yes   Sig: TAKE 10 ML (1 G TOTAL) BY MOUTH 4 (FOUR) TIMES A DAY (WITH MEALS AND AT BEDTIME)      Facility-Administered Medications: None       Past Medical History:   Diagnosis Date    GERD (gastroesophageal reflux disease)        History reviewed  No pertinent surgical history  History reviewed  No pertinent family history  I have reviewed and agree with the history as documented      E-Cigarette/Vaping    E-Cigarette Use Never User      E-Cigarette/Vaping Substances     Social History     Tobacco Use    Smoking status: Never Smoker    Smokeless tobacco: Never Used   Vaping Use    Vaping Use: Never used   Substance Use Topics    Alcohol use: Not Currently    Drug use: Yes     Types: Marijuana        Review of Systems   Constitutional: Negative for appetite change, chills and fever  HENT: Negative for congestion, rhinorrhea and sore throat  Respiratory: Negative for cough and shortness of breath  Cardiovascular: Negative for chest pain  Gastrointestinal: Positive for abdominal pain and constipation  Negative for diarrhea, nausea and vomiting  Genitourinary: Negative for dysuria, frequency, hematuria and urgency  Musculoskeletal: Negative for arthralgias and myalgias  Skin: Negative for rash  Neurological: Negative for dizziness, weakness, light-headedness, numbness and headaches  All other systems reviewed and are negative  Physical Exam  ED Triage Vitals [08/04/22 1455]   Temperature Pulse Respirations Blood Pressure SpO2   98 °F (36 7 °C) 79 20 115/72 98 %      Temp Source Heart Rate Source Patient Position - Orthostatic VS BP Location FiO2 (%)   Oral Monitor Sitting Right arm --      Pain Score       --             Orthostatic Vital Signs  Vitals:    08/04/22 1455   BP: 115/72   Pulse: 79   Patient Position - Orthostatic VS: Sitting       Physical Exam  Vitals and nursing note reviewed  Constitutional:       General: He is not in acute distress  Appearance: Normal appearance  He is well-developed and normal weight  He is not ill-appearing, toxic-appearing or diaphoretic  HENT:      Head: Normocephalic and atraumatic  Right Ear: External ear normal       Left Ear: External ear normal       Nose: Nose normal       Mouth/Throat:      Mouth: Mucous membranes are moist       Pharynx: Oropharynx is clear  Eyes:      Extraocular Movements: Extraocular movements intact  Conjunctiva/sclera: Conjunctivae normal    Cardiovascular:      Rate and Rhythm: Normal rate and regular rhythm  Pulses: Normal pulses  Heart sounds: Normal heart sounds  No murmur heard  No friction rub  No gallop  Pulmonary:      Effort: Pulmonary effort is normal  No respiratory distress  Breath sounds: Normal breath sounds  No wheezing or rales  Abdominal:      General: There is no distension  Palpations: Abdomen is soft  Tenderness: There is no abdominal tenderness  There is no right CVA tenderness, left CVA tenderness, guarding or rebound  Musculoskeletal:         General: No tenderness  Cervical back: Neck supple  Skin:     General: Skin is warm and dry  Coloration: Skin is not pale  Findings: No rash  Neurological:      General: No focal deficit present  Mental Status: He is alert and oriented to person, place, and time  Cranial Nerves: No cranial nerve deficit  Sensory: No sensory deficit  Motor: No weakness  Psychiatric:         Mood and Affect: Mood normal          Behavior: Behavior normal          ED Medications  Medications - No data to display    Diagnostic Studies  Results Reviewed     None                 No orders to display         Procedures  Procedures      ED Course                             SBIRT 22yo+    Flowsheet Row Most Recent Value   SBIRT (25 yo +)    In order to provide better care to our patients, we are screening all of our patients for alcohol and drug use  Would it be okay to ask you these screening questions? No Filed at: 08/04/2022 1553                MDM     20-year-old with no significant past medical history presents for evaluation of lower abdominal discomfort for the past three days, has also had constipation  No abdominal pain or tenderness at this time  Doubt serious intra-abdominal pathology at this time given reassuring exam  Will treat symptomatically for constipation   Offered to check labs and imaging but patient declines at this time  Discussed with patient that he should follow up with his PCP  Discussed with patient strict return precautions  Patient and his mother expressed understanding and were agreeable for discharge  Disposition  Final diagnoses:   Abdominal pain     Time reflects when diagnosis was documented in both MDM as applicable and the Disposition within this note     Time User Action Codes Description Comment    8/4/2022  3:39 PM Reanna Brandon Add [R10 9] Abdominal pain       ED Disposition     ED Disposition   Discharge    Condition   Stable    Date/Time   Thu Aug 4, 2022  3:39 PM    Comment   Dorcas  discharge to home/self care                 Follow-up Information     Follow up With Specialties Details Why R Doe Washington 71, CRNP Nurse Practitioner Schedule an appointment as soon as possible for a visit   [address] [place] [address]  [phone]            Discharge Medication List as of 8/4/2022  3:42 PM      START taking these medications    Details   polyethylene glycol (MIRALAX) 17 g packet Take 17 g by mouth daily, Starting Thu 8/4/2022, Normal         CONTINUE these medications which have NOT CHANGED    Details   acetaminophen (TYLENOL) 500 mg tablet Take 1 tablet (500 mg total) by mouth every 6 (six) hours as needed for mild pain, Starting Fri 5/15/2020, Normal      busPIRone (BUSPAR) 5 mg tablet Take 5 mg by mouth 2 (two) times a day, Starting Mon 2/28/2022, Historical Med      omeprazole (PriLOSEC) 20 mg delayed release capsule Take 1 capsule (20 mg total) by mouth daily before breakfast, Starting Fri 3/5/2021, Normal      PARoxetine (PAXIL) 20 mg tablet Take 20 mg by mouth every morning, Starting Mon 2/28/2022, Historical Med      sucralfate (CARAFATE) 1 g/10 mL suspension TAKE 10 ML (1 G TOTAL) BY MOUTH 4 (FOUR) TIMES A DAY (WITH MEALS AND AT BEDTIME), Starting Wed 7/14/2021, Normal           No discharge procedures on file     PDMP Review     None           ED Provider  Attending physically available and evaluated Benjamin Nolasco I managed the patient along with the ED Attending      Electronically Signed by         Almaz Goldman MD  08/04/22 2037

## 2022-08-07 ENCOUNTER — HOSPITAL ENCOUNTER (EMERGENCY)
Facility: HOSPITAL | Age: 22
Discharge: HOME | End: 2022-08-07
Attending: EMERGENCY MEDICINE
Payer: COMMERCIAL

## 2022-08-07 VITALS
RESPIRATION RATE: 16 BRPM | HEART RATE: 78 BPM | SYSTOLIC BLOOD PRESSURE: 116 MMHG | DIASTOLIC BLOOD PRESSURE: 99 MMHG | TEMPERATURE: 98.2 F | WEIGHT: 114.86 LBS | OXYGEN SATURATION: 99 % | BODY MASS INDEX: 17.72 KG/M2

## 2022-08-07 DIAGNOSIS — R20.2 PARESTHESIA OF LEFT LEG: Primary | ICD-10-CM

## 2022-08-07 PROCEDURE — 99282 EMERGENCY DEPT VISIT SF MDM: CPT | Performed by: EMERGENCY MEDICINE

## 2022-08-07 PROCEDURE — 99283 EMERGENCY DEPT VISIT LOW MDM: CPT

## 2022-08-07 NOTE — ED PROVIDER NOTES
History  Chief Complaint   Patient presents with    Abdominal Pain     Pt c/o LLQ pain radiating to left leg  Pt states woke this morning with the pain  Pt denies injury at this time     HPI  Patient is a 45-year-old male presenting with discomfort in his abdomen, buttocks, left leg as well as paresthesia in his left leg  Patient was recently evaluated in the emergency department for lower quadrant abdominal discomfort and was sent home with laxatives  Patient states that after having a bowel movement his symptoms improved however soon after noted that he had left lower quadrant abdominal discomfort migrates to the right side and then to his left flank  Complains that after sitting for prolonged  He feels that his left leg becomes numb and weak  After standing up for sometime him walking patient states that the symptom is relief  He is concerned that the above symptoms being near his testicles may be a sign of testicular cancer upon doing some googling  Patient otherwise denies fever, chills, nausea, vomiting, chest pain, shortness of breath, abdominal pain, diarrhea  Prior to Admission Medications   Prescriptions Last Dose Informant Patient Reported? Taking?    PARoxetine (PAXIL) 20 mg tablet  Self Yes No   Sig: Take 20 mg by mouth every morning   acetaminophen (TYLENOL) 500 mg tablet  Self No No   Sig: Take 1 tablet (500 mg total) by mouth every 6 (six) hours as needed for mild pain   busPIRone (BUSPAR) 5 mg tablet  Self Yes No   Sig: Take 5 mg by mouth 2 (two) times a day   omeprazole (PriLOSEC) 20 mg delayed release capsule  Self No No   Sig: Take 1 capsule (20 mg total) by mouth daily before breakfast   polyethylene glycol (MIRALAX) 17 g packet   No No   Sig: Take 17 g by mouth daily   sucralfate (CARAFATE) 1 g/10 mL suspension  Self No No   Sig: TAKE 10 ML (1 G TOTAL) BY MOUTH 4 (FOUR) TIMES A DAY (WITH MEALS AND AT BEDTIME)      Facility-Administered Medications: None       Past Medical History: Diagnosis Date    GERD (gastroesophageal reflux disease)        History reviewed  No pertinent surgical history  History reviewed  No pertinent family history  I have reviewed and agree with the history as documented  E-Cigarette/Vaping    E-Cigarette Use Never User      E-Cigarette/Vaping Substances     Social History     Tobacco Use    Smoking status: Never Smoker    Smokeless tobacco: Never Used   Vaping Use    Vaping Use: Never used   Substance Use Topics    Alcohol use: Not Currently    Drug use: Yes     Types: Marijuana        Review of Systems   Constitutional: Negative for chills, diaphoresis, fever and unexpected weight change  HENT: Negative for ear pain and sore throat  Eyes: Negative for visual disturbance  Respiratory: Negative for cough, chest tightness and shortness of breath  Cardiovascular: Negative for chest pain and leg swelling  Gastrointestinal: Negative for abdominal distention, abdominal pain, constipation, diarrhea, nausea and vomiting  Endocrine: Negative  Genitourinary: Negative for difficulty urinating and dysuria  Musculoskeletal: Negative  Skin: Negative  Allergic/Immunologic: Negative  Neurological:        Parasthesia of left leg    Hematological: Negative  Psychiatric/Behavioral: Negative  All other systems reviewed and are negative  Physical Exam  ED Triage Vitals [08/07/22 0111]   Temperature Pulse Respirations Blood Pressure SpO2   98 2 °F (36 8 °C) 78 16 116/99 99 %      Temp Source Heart Rate Source Patient Position - Orthostatic VS BP Location FiO2 (%)   Oral Monitor Sitting Right arm --      Pain Score       --             Orthostatic Vital Signs  Vitals:    08/07/22 0111   BP: 116/99   Pulse: 78   Patient Position - Orthostatic VS: Sitting       Physical Exam  Vitals and nursing note reviewed  Constitutional:       General: He is not in acute distress  Appearance: Normal appearance  He is not ill-appearing     HENT: Head: Normocephalic and atraumatic  Right Ear: External ear normal       Left Ear: External ear normal       Nose: Nose normal       Mouth/Throat:      Mouth: Mucous membranes are moist       Pharynx: Oropharynx is clear  Eyes:      General: No scleral icterus  Right eye: No discharge  Left eye: No discharge  Extraocular Movements: Extraocular movements intact  Conjunctiva/sclera: Conjunctivae normal       Pupils: Pupils are equal, round, and reactive to light  Cardiovascular:      Rate and Rhythm: Normal rate and regular rhythm  Pulses: Normal pulses  Heart sounds: Normal heart sounds  Pulmonary:      Effort: Pulmonary effort is normal       Breath sounds: Normal breath sounds  Abdominal:      General: Abdomen is flat  Bowel sounds are normal  There is no distension  Palpations: Abdomen is soft  Tenderness: There is no abdominal tenderness  There is no guarding or rebound  Musculoskeletal:         General: Normal range of motion  Cervical back: Normal range of motion and neck supple  Skin:     General: Skin is warm and dry  Capillary Refill: Capillary refill takes less than 2 seconds  Neurological:      General: No focal deficit present  Mental Status: He is alert and oriented to person, place, and time  Mental status is at baseline  Cranial Nerves: No cranial nerve deficit  Sensory: No sensory deficit  Motor: No weakness  Gait: Gait normal    Psychiatric:         Mood and Affect: Mood normal          Behavior: Behavior normal          Thought Content:  Thought content normal          Judgment: Judgment normal          ED Medications  Medications - No data to display    Diagnostic Studies  Results Reviewed     None                 No orders to display         Procedures  Procedures      ED Course                                       MDM  Number of Diagnoses or Management Options  Paresthesia of left leg  Diagnosis management comments:    Patient 25year old male with paresthesia of left leg   Only occurs when patient sits for a long time   Patient currently asymptomatic   Patient with no neurologic deficit on exam   Will discharge with PCP follow up     Disposition  Final diagnoses:   Paresthesia of left leg     Time reflects when diagnosis was documented in both MDM as applicable and the Disposition within this note     Time User Action Codes Description Comment    8/7/2022  1:44 AM Js Sewell Add [R20 2] Paresthesia of left leg       ED Disposition     ED Disposition   Discharge    Condition   Stable    Date/Time   Sun Aug 7, 2022  1:43 AM    Comment   Mary Just discharge to home/self care                 Follow-up Information     Follow up With Specialties Details Why Contact Info Additional Sonia 89, CRNP Nurse Practitioner Schedule an appointment as soon as possible for a visit   [address] [phone]       [address]  Emergency Department Emergency Medicine Go to  If symptoms worsen [place] [id number]  [facility] Emergency Department, [address] [place] [address], [facility]          Discharge Medication List as of 8/7/2022  1:44 AM      CONTINUE these medications which have NOT CHANGED    Details   acetaminophen (TYLENOL) 500 mg tablet Take 1 tablet (500 mg total) by mouth every 6 (six) hours as needed for mild pain, Starting Fri 5/15/2020, Normal      busPIRone (BUSPAR) 5 mg tablet Take 5 mg by mouth 2 (two) times a day, Starting Mon 2/28/2022, Historical Med      omeprazole (PriLOSEC) 20 mg delayed release capsule Take 1 capsule (20 mg total) by mouth daily before breakfast, Starting Fri 3/5/2021, Normal      PARoxetine (PAXIL) 20 mg tablet Take 20 mg by mouth every morning, Starting Mon 2/28/2022, Historical Med      polyethylene glycol (MIRALAX) 17 g packet Take 17 g by mouth daily, Starting Thu 8/4/2022, Normal      sucralfate (CARAFATE) 1 g/10 mL suspension TAKE 10 ML (1 G TOTAL) BY MOUTH 4 (FOUR) TIMES A DAY (WITH MEALS AND AT BEDTIME), Starting Wed 7/14/2021, Normal           No discharge procedures on file  PDMP Review     None           ED Provider  Attending physically available and evaluated Akhil Reading  I managed the patient along with the ED Attending      Electronically Signed by         Renan Houser MD  08/07/22 4026

## 2022-08-07 NOTE — ED ATTENDING ATTESTATION
8/7/2022  IDelphine MD, saw and evaluated the patient  I have discussed the patient with the resident/non-physician practitioner and agree with the resident's/non-physician practitioner's findings, Plan of Care, and MDM as documented in the resident's/non-physician practitioner's note, except where noted  All available labs and Radiology studies were reviewed  I was present for key portions of any procedure(s) performed by the resident/non-physician practitioner and I was immediately available to provide assistance  At this point I agree with the current assessment done in the Emergency Department  I have conducted an independent evaluation of this patient a history and physical is as follows:      Final Diagnosis:  1  Paresthesia of left leg      Chief Complaint   Patient presents with    Abdominal Pain     Pt c/o LLQ pain radiating to left leg  Pt states woke this morning with the pain  Pt denies injury at this time       This is a 69-year-old male with history of anxiety who presents with vague numbness/tingling in his buttock  Patient has an ongoing issue with a numbness/tingling sensation that is intermittent in the left buttock, left groin, left hamstring area  Symptoms mainly occur when he is sitting too long  Symptoms gradually improved when he takes weight off of the left buttock or stands up  Denies fever, saddle anesthesia, bowel/bladder issues, lower extremity numbness/weakness, history of IV drug use, history of cancer, bleeding issues, recent spinal instrumentation  PMH:  - anxiety  PSH:  - not applicable    PE:   Vitals:    08/07/22 0111   BP: 116/99   BP Location: Right arm   Pulse: 78   Resp: 16   Temp: 98 2 °F (36 8 °C)   TempSrc: Oral   SpO2: 99%   Weight: 52 1 kg (114 lb 13 8 oz)         Constitutional: Vital signs are normal  He appears well-developed  He is cooperative  No distress  Cardiovascular: Normal rate, regular rhythm    Pulmonary/Chest: Effort normal  Abdominal:  Normal appearance, no distension  Neurological: He is alert  Normal gait  Skin: Skin is warm, dry and intact  Psychiatric: He has a normal mood and affect  His speech is normal and behavior is normal  Thought content normal        A:  - this is a 60-year-old male with history of anxiety who presents with a vague complaint of numbness/tingling originating in the left buttock  P:  - likely intermittent nerve impingement  Reassurance given  Strict return precautions given  Follow up with family doctor  - 13 point ROS was performed and all are normal unless stated in the history above  - Nursing note reviewed  Vitals reviewed  - Orders placed by myself and/or advanced practitioner / resident     - Previous chart was reviewed  - No language barrier    - History obtained from patient  - There are no limitations to the history obtained  - Critical care time: Not applicable for this patient  Medications - No data to display  No orders to display     No orders of the defined types were placed in this encounter  Labs Reviewed - No data to display  Time reflects when diagnosis was documented in both MDM as applicable and the Disposition within this note     Time User Action Codes Description Comment    8/7/2022  1:44 AM David Christopher Add [R20 2] Paresthesia of left leg       ED Disposition     ED Disposition   Discharge    Condition   Stable    Date/Time   Sun Aug 7, 2022  1:43 AM    Comment   Monika Quintana discharge to home/self care                 Follow-up Information     Follow up With Specialties Details Why Contact Info Additional KURT Joseph Nurse Practitioner Schedule an appointment as soon as possible for a visit   [address] [place] [address] [phone] [address]  Emergency Department Emergency Medicine Go to  If symptoms worsen [facility] [id number]  [address] [place] [address] [facility] Emergency Department, [address]  , [place], [place], [address]        Patient's Medications   Discharge Prescriptions    No medications on file     No discharge procedures on file  Prior to Admission Medications   Prescriptions Last Dose Informant Patient Reported? Taking? PARoxetine (PAXIL) 20 mg tablet  Self Yes No   Sig: Take 20 mg by mouth every morning   acetaminophen (TYLENOL) 500 mg tablet  Self No No   Sig: Take 1 tablet (500 mg total) by mouth every 6 (six) hours as needed for mild pain   busPIRone (BUSPAR) 5 mg tablet  Self Yes No   Sig: Take 5 mg by mouth 2 (two) times a day   omeprazole (PriLOSEC) 20 mg delayed release capsule  Self No No   Sig: Take 1 capsule (20 mg total) by mouth daily before breakfast   polyethylene glycol (MIRALAX) 17 g packet   No No   Sig: Take 17 g by mouth daily   sucralfate (CARAFATE) 1 g/10 mL suspension  Self No No   Sig: TAKE 10 ML (1 G TOTAL) BY MOUTH 4 (FOUR) TIMES A DAY (WITH MEALS AND AT BEDTIME)      Facility-Administered Medications: None       Portions of the record may have been created with voice recognition software  Occasional wrong word or "sound a like" substitutions may have occurred due to the inherent limitations of voice recognition software  Read the chart carefully and recognize, using context, where substitutions have occurred        ED Course         Critical Care Time  Procedures

## 2022-08-08 ENCOUNTER — OFFICE VISIT (OUTPATIENT)
Dept: FAMILY MEDICINE CLINIC | Facility: CLINIC | Age: 22
End: 2022-08-08
Payer: COMMERCIAL

## 2022-08-08 VITALS
TEMPERATURE: 97.9 F | HEART RATE: 83 BPM | OXYGEN SATURATION: 98 % | BODY MASS INDEX: 17.62 KG/M2 | WEIGHT: 114.2 LBS | SYSTOLIC BLOOD PRESSURE: 100 MMHG | DIASTOLIC BLOOD PRESSURE: 70 MMHG | RESPIRATION RATE: 18 BRPM

## 2022-08-08 DIAGNOSIS — M79.18 BUTTOCK PAIN: Primary | ICD-10-CM

## 2022-08-08 DIAGNOSIS — F41.9 ANXIETY: Chronic | ICD-10-CM

## 2022-08-08 PROCEDURE — 99213 OFFICE O/P EST LOW 20 MIN: CPT | Performed by: FAMILY MEDICINE

## 2022-08-08 NOTE — ASSESSMENT & PLAN NOTE
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

## 2022-08-08 NOTE — PATIENT INSTRUCTIONS
Use Tylenol or Motrin at bedtime  Start core strengthening exercises and hamstring stretches  He may also use a heating pad when her sitting for long periods of time

## 2022-08-08 NOTE — ASSESSMENT & PLAN NOTE
New  ·  likely acute muscle spasm  · Pain with hip flexion against resistance  · No focal neuro deficits  · Normal strength and sensation   · Decreased hip mobility and spasmed hamstrings  · Start daily core strengthening exercises and routine physical  Activity   · May also use heating pads  · Follow-up in 1 month p r n

## 2022-08-08 NOTE — PROGRESS NOTES
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  Patient is visibly anxious today as concern is testicular cancer  Recommend patient restart BuSpar 5 mg b i d  Does not need to start Paxil as this is the side effect from this  Follow-up as needed              Subjective:      Patient ID: Sonam Vega is a 25 y o  male presents today for er follow up  Was seen on 8/4/2022 for bloated and constipation that is improving after moving bowels  Has been taking miralax daily and is having soft bowel movements  Was then seen on 8/7/2022 for left buttock pain  Pain is getting worse and causing him to wake up with it  Tried ibuprofen which he is unsure if it helps  Denies any trauma  Denies dysuria, hematuria, frequency, or urgency  Denies any saddle anesthesia, weakness  Pain is worse when sitting and laying  HPI    The following portions of the patient's history were reviewed and updated as appropriate: allergies, current medications, past family history, past medical history, past social history, past surgical history and problem list     Review of Systems   Constitutional: Negative for activity change, chills, diaphoresis and fever  HENT: Negative for ear pain, hearing loss, postnasal drip, rhinorrhea, sinus pressure, sinus pain, sneezing and sore throat      Respiratory: Negative for cough, chest tightness, shortness of breath and wheezing  Cardiovascular: Negative for chest pain, palpitations and leg swelling  Gastrointestinal: Negative for abdominal pain, blood in stool, constipation, diarrhea, nausea and vomiting  Genitourinary: Negative for dysuria, frequency, hematuria and urgency  Musculoskeletal: Negative for arthralgias, back pain, gait problem, joint swelling, myalgias, neck pain and neck stiffness  Buttock pain   Neurological: Negative for dizziness, syncope, weakness, light-headedness, numbness and headaches  Psychiatric/Behavioral: The patient is nervous/anxious  Objective:      /70 (BP Location: Left arm, Patient Position: Sitting, Cuff Size: Standard)   Pulse 83   Temp 97 9 °F (36 6 °C) (Temporal)   Resp 18   Wt 51 8 kg (114 lb 3 2 oz)   SpO2 98%   BMI 17 62 kg/m²          Physical Exam  Vitals reviewed  Constitutional:       General: He is not in acute distress  Appearance: He is well-developed  He is not diaphoretic  HENT:      Head: Normocephalic and atraumatic  Right Ear: External ear normal       Left Ear: External ear normal       Nose: Nose normal  No congestion  Mouth/Throat:      Mouth: Mucous membranes are moist       Pharynx: Oropharynx is clear  No oropharyngeal exudate  Eyes:      General: No scleral icterus  Pupils: Pupils are equal, round, and reactive to light  Neck:      Thyroid: No thyromegaly  Vascular: No JVD  Trachea: No tracheal deviation  Cardiovascular:      Rate and Rhythm: Normal rate and regular rhythm  Pulses: Normal pulses  Heart sounds: Normal heart sounds  No murmur heard  No friction rub  Pulmonary:      Effort: Pulmonary effort is normal  No respiratory distress  Breath sounds: Normal breath sounds  No wheezing or rales  Chest:      Chest wall: No tenderness  Abdominal:      General: Bowel sounds are normal  There is no distension  Palpations: Abdomen is soft  Tenderness: There is no abdominal tenderness  There is no right CVA tenderness, left CVA tenderness, guarding or rebound  Hernia: A hernia is present  There is no hernia in the left inguinal area or right inguinal area  Genitourinary:     Pubic Area: No rash  Penis: Normal and uncircumcised  Testes: Normal          Right: Mass, tenderness or swelling not present  Left: Mass, tenderness or swelling not present  Epididymis:      Right: Normal       Left: Normal    Musculoskeletal:      Cervical back: Normal range of motion and neck supple  No tenderness  Right hip: No deformity, lacerations, tenderness or bony tenderness  Normal range of motion  Normal strength  Left hip: Tenderness present  No deformity, lacerations or bony tenderness  Decreased range of motion  Normal strength  Right lower leg: No edema  Left lower leg: No edema  Legs:       Comments:  Negative SLR bilateral   Lymphadenopathy:      Cervical: No cervical adenopathy  Lower Body: No right inguinal adenopathy  No left inguinal adenopathy  Skin:     General: Skin is warm and dry  Neurological:      Mental Status: He is alert and oriented to person, place, and time  Mental status is at baseline  Deep Tendon Reflexes: Reflexes are normal and symmetric  Psychiatric:         Mood and Affect: Affect normal  Mood is anxious  Behavior: Behavior normal          Thought Content:  Thought content normal          Judgment: Judgment normal

## 2022-10-25 ENCOUNTER — TELEPHONE (OUTPATIENT)
Dept: FAMILY MEDICINE CLINIC | Facility: CLINIC | Age: 22
End: 2022-10-25

## 2022-10-25 ENCOUNTER — OFFICE VISIT (OUTPATIENT)
Dept: FAMILY MEDICINE CLINIC | Facility: CLINIC | Age: 22
End: 2022-10-25

## 2022-10-25 VITALS
OXYGEN SATURATION: 98 % | HEIGHT: 69 IN | BODY MASS INDEX: 16.56 KG/M2 | HEART RATE: 81 BPM | WEIGHT: 111.8 LBS | TEMPERATURE: 98.3 F | DIASTOLIC BLOOD PRESSURE: 72 MMHG | SYSTOLIC BLOOD PRESSURE: 102 MMHG

## 2022-10-25 DIAGNOSIS — J32.9 RHINOSINUSITIS: ICD-10-CM

## 2022-10-25 DIAGNOSIS — J31.0 RHINOSINUSITIS: ICD-10-CM

## 2022-10-25 DIAGNOSIS — M54.2 NECK PAIN: Primary | ICD-10-CM

## 2022-10-25 RX ORDER — FLUTICASONE PROPIONATE 50 MCG
2 SPRAY, SUSPENSION (ML) NASAL DAILY
Qty: 18.2 ML | Refills: 2 | Status: SHIPPED | OUTPATIENT
Start: 2022-10-25

## 2022-10-25 RX ORDER — NAPROXEN 500 MG/1
500 TABLET ORAL 2 TIMES DAILY WITH MEALS
Qty: 60 TABLET | Refills: 5 | Status: SHIPPED | OUTPATIENT
Start: 2022-10-25

## 2022-10-25 NOTE — ASSESSMENT & PLAN NOTE
New  · Likely muscle spasm  · No spinous process tenderness  · Pain with neck flexion and extension  · B/l paraspinal muscle tenderness/spasm and trapezius spasm  · Start naproxen, heating pads, and stretches

## 2022-10-25 NOTE — PROGRESS NOTES
Assessment/Plan:      1  Neck pain  Assessment & Plan:  New  Likely muscle spasm  No spinous process tenderness  Pain with neck flexion and extension  B/l paraspinal muscle tenderness/spasm and trapezius spasm  Start naproxen, heating pads, and stretches    Orders:  -     naproxen (Naprosyn) 500 mg tablet; Take 1 tablet (500 mg total) by mouth 2 (two) times a day with meals    2  Rhinosinusitis  Assessment & Plan:  Chronic  Post nasal drip and congestion for the past 3 months  Pressure has improved but continues to have PND  Patient have erythematous and swollen turbinates  No tenderness to maxillar/frontal sinuses  Presence of deviated septum  Start flonase daily  Follow up prn    Orders:  -     fluticasone (FLONASE) 50 mcg/act nasal spray; 2 sprays into each nostril daily            Subjective:      Patient ID: Sophia Hernandez is a 25 y o  male  Neck pain that started 4 days ago  He tried taking tylenol and tiger balm without improvement  No radicular symptoms    Sinus pressure/post nasal drip that started for several months  Tried tylenol sinus which helped with congestion but not PND  This is worsening  HPI    The following portions of the patient's history were reviewed and updated as appropriate: allergies, current medications, past family history, past medical history, past social history, past surgical history and problem list     Review of Systems   Constitutional: Negative for activity change, chills, diaphoresis and fever  HENT: Positive for postnasal drip, rhinorrhea, sinus pressure and sinus pain  Negative for ear pain, hearing loss, sneezing and sore throat  Respiratory: Negative for cough, chest tightness, shortness of breath and wheezing  Cardiovascular: Negative for chest pain, palpitations and leg swelling  Gastrointestinal: Negative for abdominal pain, blood in stool, constipation, diarrhea, nausea and vomiting     Genitourinary: Negative for dysuria, frequency, hematuria and urgency  Musculoskeletal: Positive for neck pain and neck stiffness  Negative for arthralgias and myalgias  Neurological: Negative for dizziness, syncope, weakness, light-headedness, numbness and headaches  Objective:      /72 (BP Location: Left arm, Patient Position: Sitting, Cuff Size: Adult)   Pulse 81   Temp 98 3 °F (36 8 °C)   Ht 5' 9" (1 753 m)   Wt 50 7 kg (111 lb 12 8 oz)   SpO2 98%   BMI 16 51 kg/m²          Physical Exam  Vitals reviewed  Constitutional:       General: He is not in acute distress  Appearance: He is well-developed  He is not diaphoretic  HENT:      Head: Normocephalic and atraumatic  Right Ear: External ear normal       Left Ear: External ear normal       Nose: Septal deviation and congestion present  Right Nostril: No foreign body, epistaxis, septal hematoma or occlusion  Left Nostril: No foreign body, epistaxis, septal hematoma or occlusion  Right Turbinates: Swollen  Left Turbinates: Swollen  Right Sinus: No maxillary sinus tenderness or frontal sinus tenderness  Left Sinus: No maxillary sinus tenderness or frontal sinus tenderness  Mouth/Throat:      Mouth: Mucous membranes are moist       Pharynx: Oropharynx is clear  No oropharyngeal exudate  Eyes:      General: No scleral icterus  Pupils: Pupils are equal, round, and reactive to light  Neck:      Thyroid: No thyromegaly  Vascular: No JVD  Trachea: No tracheal deviation  Cardiovascular:      Rate and Rhythm: Normal rate and regular rhythm  Pulses: Normal pulses  Heart sounds: Normal heart sounds  No murmur heard  No friction rub  Pulmonary:      Effort: Pulmonary effort is normal  No respiratory distress  Breath sounds: Normal breath sounds  No wheezing or rales  Chest:      Chest wall: No tenderness  Abdominal:      General: Bowel sounds are normal  There is no distension  Palpations: Abdomen is soft  Tenderness: There is no abdominal tenderness  There is no right CVA tenderness, left CVA tenderness, guarding or rebound  Musculoskeletal:         General: No tenderness  Normal range of motion  Cervical back: Normal range of motion and neck supple  Spasms present  No swelling, deformity, erythema, signs of trauma, lacerations, rigidity, torticollis, tenderness, bony tenderness or crepitus  Pain with movement present  Normal range of motion  Thoracic back: Normal       Lumbar back: Normal       Right lower leg: No edema  Left lower leg: No edema  Lymphadenopathy:      Cervical: No cervical adenopathy  Skin:     General: Skin is warm and dry  Neurological:      Mental Status: He is alert and oriented to person, place, and time  Mental status is at baseline  Deep Tendon Reflexes: Reflexes are normal and symmetric  Psychiatric:         Mood and Affect: Mood normal          Behavior: Behavior normal          Thought Content:  Thought content normal          Judgment: Judgment normal

## 2022-10-25 NOTE — ASSESSMENT & PLAN NOTE
Chronic  · Post nasal drip and congestion for the past 3 months  · Pressure has improved but continues to have PND  · Patient have erythematous and swollen turbinates  · No tenderness to maxillar/frontal sinuses  · Presence of deviated septum  · Start flonase daily  · Follow up prn

## 2022-12-24 PROBLEM — J32.9 RHINOSINUSITIS: Status: RESOLVED | Noted: 2022-10-25 | Resolved: 2022-12-24

## 2022-12-24 PROBLEM — J31.0 RHINOSINUSITIS: Status: RESOLVED | Noted: 2022-10-25 | Resolved: 2022-12-24

## 2023-01-24 ENCOUNTER — OFFICE VISIT (OUTPATIENT)
Dept: FAMILY MEDICINE CLINIC | Facility: CLINIC | Age: 23
End: 2023-01-24

## 2023-01-24 VITALS
TEMPERATURE: 97.6 F | DIASTOLIC BLOOD PRESSURE: 80 MMHG | OXYGEN SATURATION: 99 % | WEIGHT: 117.6 LBS | SYSTOLIC BLOOD PRESSURE: 110 MMHG | BODY MASS INDEX: 17.37 KG/M2 | HEART RATE: 84 BPM

## 2023-01-24 DIAGNOSIS — F41.9 ANXIETY AND DEPRESSION: ICD-10-CM

## 2023-01-24 DIAGNOSIS — J30.2 SEASONAL ALLERGIES: ICD-10-CM

## 2023-01-24 DIAGNOSIS — K21.9 GASTROESOPHAGEAL REFLUX DISEASE WITHOUT ESOPHAGITIS: ICD-10-CM

## 2023-01-24 DIAGNOSIS — F32.A ANXIETY AND DEPRESSION: ICD-10-CM

## 2023-01-24 DIAGNOSIS — K29.50 CHRONIC GASTRITIS WITHOUT BLEEDING, UNSPECIFIED GASTRITIS TYPE: Primary | ICD-10-CM

## 2023-01-24 RX ORDER — OMEPRAZOLE 20 MG/1
20 CAPSULE, DELAYED RELEASE ORAL
Qty: 30 CAPSULE | Refills: 3 | Status: SHIPPED | OUTPATIENT
Start: 2023-01-24

## 2023-01-24 RX ORDER — CETIRIZINE HYDROCHLORIDE 10 MG/1
10 TABLET ORAL DAILY
Qty: 30 TABLET | Refills: 0 | Status: SHIPPED | OUTPATIENT
Start: 2023-01-24

## 2023-01-24 RX ORDER — SERTRALINE HYDROCHLORIDE 25 MG/1
25 TABLET, FILM COATED ORAL DAILY
Qty: 30 TABLET | Refills: 5 | Status: SHIPPED | OUTPATIENT
Start: 2023-01-24

## 2023-01-24 NOTE — ASSESSMENT & PLAN NOTE
Certirizine 05MJ started  [place] daily- if he is having rebound congestion, suggested tapering down the flonase and starting the certirizine

## 2023-01-24 NOTE — ASSESSMENT & PLAN NOTE
PHQ9 Score 19- Severe depression   · No loner on buspirone and paxil due to anorgasmia  · After discussion willing to try a different SSRI    New medication Sertraline 25mg   · Advised that it will take 4-6 weeks to see effectiveness and that anorgasmia can also occur   Discussed lifestyle modifications like meditation, relaxation and other non-pharmalogical therapies   Follow up in 4 weeks

## 2023-01-24 NOTE — PROGRESS NOTES
Name: Sophia Sotomayor      : 2000      MRN: [id number]  Encounter Provider: KURT Mccann  Encounter Date: 2023   Encounter department: [address] [place] [address]     1  Chronic gastritis without bleeding, unspecified gastritis type  Assessment & Plan:  Recurrent episodes of nausea with abdominal discomfort   · Symptoms begin in the morning and last until late in the evening   Was previously seen by GI who recommended an EGD but never completed as patient was feeling better   Appetite poor and he does not want to eat at the risk of stomach pain   · Food allergy panel ordered   · Educated on the need to try a food elimination diet to see if there is a particular trigger associated with symptoms     Orders:  -     omeprazole (PriLOSEC) 20 mg delayed release capsule; Take 1 capsule (20 mg total) by mouth daily before breakfast    2  Gastroesophageal reflux disease without esophagitis  Assessment & Plan:  Restarted Omeprazole 20mg  Avoid dietary triggers, limit food before bed      3  Seasonal allergies  Assessment & Plan:  Certirizine 94NH started  Takes flonase daily- if he is having rebound congestion, suggested tapering down the flonase and starting the certirizine    Orders:  -     cetirizine (ZyrTEC) 10 mg tablet; Take 1 tablet (10 mg total) by mouth daily  -     FOOD AND TREE NUT ALLERGY PANEL; Future    4  Anxiety and depression  Assessment & Plan:  PHQ9 Score 19- Severe depression   · No loner on buspirone and paxil due to anorgasmia  · After discussion willing to try a different SSRI    New medication Sertraline 25mg   · Advised that it will take 4-6 weeks to see effectiveness and that anorgasmia can also occur   Discussed lifestyle modifications like meditation, relaxation and other non-pharmalogical therapies   Follow up in 4 weeks    Orders:  -     sertraline (Zoloft) 25 mg tablet;  Take 1 tablet (25 mg total) by mouth daily         Subjective Acute episode of nausea/vomitting  1st day nausea then the second day was vomiting  It has been gradually improving but still irritated  History of chronic gastritis- was being seen by gastroenterology never followed through with EGD as he was feeling better       Abdominal Pain  This is a new problem  The current episode started in the past 7 days  The onset quality is sudden  The problem has been gradually improving since onset  The pain is located in the generalized abdominal region  The pain is at a severity of 4/10  The pain is moderate  The quality of the pain is described as cramping  The pain does not radiate  Associated symptoms include anxiety, diarrhea, flatus, nausea and vomiting  Pertinent negatives include no anorexia, arthralgias, belching, constipation, dysuria, fever, frequency, headaches, hematochezia, hematuria, melena, myalgias, rash or sore throat  Nothing relieves the symptoms  He consumes acidic food, caffeinated beverages and carbonated beverages  Review of Systems   Constitutional: Negative for activity change, chills, fatigue and fever  HENT: Negative for congestion, ear pain, rhinorrhea and sore throat  Eyes: Negative for pain and visual disturbance  Respiratory: Negative for cough, chest tightness and shortness of breath  Cardiovascular: Negative for chest pain, palpitations and leg swelling  Gastrointestinal: Positive for abdominal pain, diarrhea, flatus, nausea and vomiting  Negative for anorexia, constipation, hematochezia and melena  Genitourinary: Negative for decreased urine volume, dysuria, frequency, hematuria and urgency  Musculoskeletal: Negative for arthralgias, back pain and myalgias  Skin: Negative for color change and rash  Neurological: Negative for seizures, syncope and headaches  Psychiatric/Behavioral: Positive for dysphoric mood  Negative for behavioral problems, decreased concentration, self-injury and sleep disturbance   The patient is nervous/anxious  All other systems reviewed and are negative  Current Outpatient Medications on File Prior to Visit   Medication Sig   • fluticasone (FLONASE) 50 mcg/act nasal spray 2 sprays into each nostril daily   • naproxen (Naprosyn) 500 mg tablet Take 1 tablet (500 mg total) by mouth 2 (two) times a day with meals   • [DISCONTINUED] busPIRone (BUSPAR) 5 mg tablet Take 5 mg by mouth 2 (two) times a day (Patient not taking: Reported on 8/8/2022)   • [DISCONTINUED] omeprazole (PriLOSEC) 20 mg delayed release capsule Take 1 capsule (20 mg total) by mouth daily before breakfast (Patient not taking: Reported on 8/8/2022)       Objective     /80 (BP Location: Left arm, Patient Position: Sitting, Cuff Size: Adult)   Pulse 84   Temp 97 6 °F (36 4 °C) (Temporal)   Wt 53 3 kg (117 lb 9 6 oz)   SpO2 99%   BMI 17 37 kg/m²     Physical Exam  Vitals reviewed  Constitutional:       Appearance: Normal appearance  He is well-developed and normal weight  HENT:      Head: Normocephalic  Right Ear: Tympanic membrane, ear canal and external ear normal       Left Ear: Tympanic membrane, ear canal and external ear normal       Nose: Nose normal       Mouth/Throat:      Mouth: Mucous membranes are moist       Pharynx: Oropharynx is clear  Eyes:      Extraocular Movements: Extraocular movements intact  Conjunctiva/sclera: Conjunctivae normal       Pupils: Pupils are equal, round, and reactive to light  Cardiovascular:      Rate and Rhythm: Normal rate and regular rhythm  Pulses: Normal pulses  Heart sounds: Normal heart sounds  Pulmonary:      Effort: Pulmonary effort is normal       Breath sounds: Normal breath sounds  Abdominal:      General: Abdomen is flat  Bowel sounds are normal       Palpations: Abdomen is soft  Musculoskeletal:         General: Normal range of motion  Cervical back: Normal range of motion  Skin:     General: Skin is warm        Capillary Refill: Capillary refill takes less than 2 seconds  Neurological:      General: No focal deficit present  Mental Status: He is alert and oriented to person, place, and time  Mental status is at baseline  Psychiatric:         Mood and Affect: Mood normal          Behavior: Behavior normal          Thought Content:  Thought content normal          Judgment: Judgment normal        KURT Mayen

## 2023-01-24 NOTE — ASSESSMENT & PLAN NOTE
Recurrent episodes of nausea with abdominal discomfort   · Symptoms begin in the morning and last until late in the evening   Was previously seen by GI who recommended an EGD but never completed as patient was feeling better   Appetite poor and he does not want to eat at the risk of stomach pain   · Food allergy panel ordered   · Educated on the need to try a food elimination diet to see if there is a particular trigger associated with symptoms

## 2023-02-21 ENCOUNTER — OFFICE VISIT (OUTPATIENT)
Dept: FAMILY MEDICINE CLINIC | Facility: CLINIC | Age: 23
End: 2023-02-21

## 2023-02-21 VITALS
TEMPERATURE: 98.3 F | BODY MASS INDEX: 17.72 KG/M2 | RESPIRATION RATE: 18 BRPM | SYSTOLIC BLOOD PRESSURE: 120 MMHG | WEIGHT: 120 LBS | DIASTOLIC BLOOD PRESSURE: 82 MMHG | OXYGEN SATURATION: 98 % | HEART RATE: 81 BPM

## 2023-02-21 DIAGNOSIS — K29.50 CHRONIC GASTRITIS WITHOUT BLEEDING, UNSPECIFIED GASTRITIS TYPE: ICD-10-CM

## 2023-02-21 DIAGNOSIS — F41.9 ANXIETY AND DEPRESSION: ICD-10-CM

## 2023-02-21 DIAGNOSIS — K21.9 GASTROESOPHAGEAL REFLUX DISEASE WITHOUT ESOPHAGITIS: Primary | ICD-10-CM

## 2023-02-21 DIAGNOSIS — F32.A ANXIETY AND DEPRESSION: ICD-10-CM

## 2023-02-21 NOTE — PROGRESS NOTES
Assessment/Plan:      1  Gastroesophageal reflux disease without esophagitis  -     Ambulatory Referral to Gastroenterology; Future    2  Chronic gastritis without bleeding, unspecified gastritis type  Assessment & Plan:  Improved with restarting omeprazole 20mg  Did not follow up with GI  Referral placed today back since it has been 2 years since last visit  Orders:  -     Ambulatory Referral to Gastroenterology; Future    3  Anxiety and depression  Assessment & Plan:  Unchanged  Patient needs referral for behavioral therapy  Does not like his current therapist  Patient continues to self-treat with medical marijuana  Smokes 1 5g daily  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  Start zoloft 25mg and follow up in 1 month  Wean off marijuana reliance  Social work referral provided for additional resources    Orders:  -     Ambulatory Referral to [facility]; Future  -     Ambulatory Referral to Social Work Care Management Program; Future  -     Comprehensive metabolic panel; Future  -     TSH, 3rd generation with Free T4 reflex; Future            Subjective:      Patient ID: Elena Dillon is a 25 y o  male presents today for stomach pain and anxiety follow up  Patient is using marijuana  Has medical marijuana card  Smokes about 3 grams every 2 days  Does not like to go out in public  Avoids large crowds  Goes to therapy but does not like his therapist  Did not start zoloft because he was afraid it would interact with marijuana  Since restarting omeprazole his stomach pains improved      HPI    The following portions of the patient's history were reviewed and updated as appropriate: allergies, current medications, past family history, past medical history, past social history, past surgical history and problem list     Review of Systems   Constitutional: Negative for activity change, chills, diaphoresis and fever     HENT: Negative for ear pain, hearing loss, postnasal drip, rhinorrhea, sinus pressure, sinus pain, sneezing and sore throat  Respiratory: Negative for cough, chest tightness, shortness of breath and wheezing  Cardiovascular: Negative for chest pain, palpitations and leg swelling  Gastrointestinal: Negative for abdominal pain, blood in stool, constipation, diarrhea, nausea and vomiting  Genitourinary: Negative for dysuria, frequency, hematuria and urgency  Musculoskeletal: Negative for arthralgias and myalgias  Neurological: Negative for dizziness, syncope, weakness, light-headedness, numbness and headaches  Psychiatric/Behavioral: The patient is nervous/anxious  Objective:      /82 (BP Location: Left arm, Patient Position: Sitting, Cuff Size: Standard)   Pulse 81   Temp 98 3 °F (36 8 °C) (Temporal)   Resp 18   Wt 54 4 kg (120 lb)   SpO2 98%   BMI 17 72 kg/m²          Physical Exam  Vitals reviewed  Constitutional:       General: He is not in acute distress  Appearance: He is well-developed  He is not diaphoretic  HENT:      Head: Normocephalic and atraumatic  Right Ear: External ear normal       Left Ear: External ear normal       Nose: Nose normal  No congestion  Mouth/Throat:      Mouth: Mucous membranes are moist       Pharynx: Oropharynx is clear  No oropharyngeal exudate  Eyes:      General: No scleral icterus  Pupils: Pupils are equal, round, and reactive to light  Neck:      Thyroid: No thyromegaly  Vascular: No JVD  Trachea: No tracheal deviation  Cardiovascular:      Rate and Rhythm: Normal rate and regular rhythm  Pulses: Normal pulses  Heart sounds: Normal heart sounds  No murmur heard  No friction rub  Pulmonary:      Effort: Pulmonary effort is normal  No respiratory distress  Breath sounds: Normal breath sounds  No wheezing or rales  Chest:      Chest wall: No tenderness  Abdominal:      General: Bowel sounds are normal  There is no distension  Palpations: Abdomen is soft  Tenderness: There is no abdominal tenderness  There is no right CVA tenderness, left CVA tenderness, guarding or rebound  Musculoskeletal:         General: No tenderness  Normal range of motion  Cervical back: Normal range of motion and neck supple  No tenderness  Right lower leg: No edema  Left lower leg: No edema  Lymphadenopathy:      Cervical: No cervical adenopathy  Skin:     General: Skin is warm and dry  Neurological:      Mental Status: He is alert and oriented to person, place, and time  Mental status is at baseline  Deep Tendon Reflexes: Reflexes are normal and symmetric  Psychiatric:         Mood and Affect: Affect normal  Mood is anxious  Behavior: Behavior normal          Thought Content:  Thought content normal          Judgment: Judgment normal

## 2023-02-21 NOTE — PATIENT INSTRUCTIONS
Start taking Zoloft 25 mg daily  For any side effects  While taking that try to cut back on your marijuana usage  The goal is for the Zoloft to calm your overall anxiety down to manageable level and then you may use your marijuana for panic attacks or spikes in your anxiety

## 2023-02-21 NOTE — ASSESSMENT & PLAN NOTE
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

## 2023-02-21 NOTE — ASSESSMENT & PLAN NOTE
· Improved with restarting omeprazole 20mg  · Did not follow up with GI  · Referral placed today back since it has been 2 years since last visit

## 2023-02-24 ENCOUNTER — PATIENT OUTREACH (OUTPATIENT)
Dept: FAMILY MEDICINE CLINIC | Facility: CLINIC | Age: 23
End: 2023-02-24

## 2023-03-01 ENCOUNTER — PATIENT OUTREACH (OUTPATIENT)
Dept: FAMILY MEDICINE CLINIC | Facility: CLINIC | Age: 23
End: 2023-03-01

## 2023-03-01 NOTE — LETTER
[address] [id number]    Re: Care Coordination   3/1/2023       Dear Darcie Lucia,    I am a Social Work Care Manager  My job is to connect patients with community resources  I tried to reach you by phone and was unfortunately unable to reach you  It is important that you contact me as soon as you are able        Sincerely,         Jennifer Squires, MSW, LCSW   Care Manager  [phone]

## 2023-03-06 ENCOUNTER — TELEPHONE (OUTPATIENT)
Dept: PSYCHIATRY | Facility: CLINIC | Age: 23
End: 2023-03-06

## 2023-03-08 ENCOUNTER — APPOINTMENT (OUTPATIENT)
Dept: LAB | Age: 23
End: 2023-03-08

## 2023-03-08 DIAGNOSIS — F41.9 ANXIETY AND DEPRESSION: ICD-10-CM

## 2023-03-08 DIAGNOSIS — J30.2 SEASONAL ALLERGIES: ICD-10-CM

## 2023-03-08 DIAGNOSIS — F32.A ANXIETY AND DEPRESSION: ICD-10-CM

## 2023-03-08 LAB
ALBUMIN SERPL BCP-MCNC: 4.4 G/DL (ref 3.5–5)
ALP SERPL-CCNC: 68 U/L (ref 46–116)
ALT SERPL W P-5'-P-CCNC: 48 U/L (ref 12–78)
ANION GAP SERPL CALCULATED.3IONS-SCNC: 3 MMOL/L (ref 4–13)
AST SERPL W P-5'-P-CCNC: 17 U/L (ref 5–45)
BILIRUB SERPL-MCNC: 0.76 MG/DL (ref 0.2–1)
BUN SERPL-MCNC: 15 MG/DL (ref 5–25)
CALCIUM SERPL-MCNC: 9.7 MG/DL (ref 8.3–10.1)
CHLORIDE SERPL-SCNC: 109 MMOL/L (ref 96–108)
CO2 SERPL-SCNC: 25 MMOL/L (ref 21–32)
CREAT SERPL-MCNC: 1.01 MG/DL (ref 0.6–1.3)
GFR SERPL CREATININE-BSD FRML MDRD: 105 ML/MIN/1.73SQ M
GLUCOSE P FAST SERPL-MCNC: 101 MG/DL (ref 65–99)
POTASSIUM SERPL-SCNC: 3.7 MMOL/L (ref 3.5–5.3)
PROT SERPL-MCNC: 7.4 G/DL (ref 6.4–8.4)
SODIUM SERPL-SCNC: 137 MMOL/L (ref 135–147)
TSH SERPL DL<=0.05 MIU/L-ACNC: 2.41 UIU/ML (ref 0.45–4.5)

## 2023-03-09 ENCOUNTER — OFFICE VISIT (OUTPATIENT)
Dept: FAMILY MEDICINE CLINIC | Facility: CLINIC | Age: 23
End: 2023-03-09

## 2023-03-09 VITALS
TEMPERATURE: 98.3 F | OXYGEN SATURATION: 97 % | SYSTOLIC BLOOD PRESSURE: 122 MMHG | HEIGHT: 69 IN | DIASTOLIC BLOOD PRESSURE: 80 MMHG | WEIGHT: 116 LBS | HEART RATE: 86 BPM | BODY MASS INDEX: 17.18 KG/M2

## 2023-03-09 DIAGNOSIS — Z00.00 ANNUAL PHYSICAL EXAM: Primary | ICD-10-CM

## 2023-03-09 DIAGNOSIS — F51.04 PSYCHOPHYSIOLOGICAL INSOMNIA: ICD-10-CM

## 2023-03-09 DIAGNOSIS — K29.50 CHRONIC GASTRITIS WITHOUT BLEEDING, UNSPECIFIED GASTRITIS TYPE: ICD-10-CM

## 2023-03-09 DIAGNOSIS — J30.2 SEASONAL ALLERGIES: ICD-10-CM

## 2023-03-09 DIAGNOSIS — F41.9 ANXIETY AND DEPRESSION: ICD-10-CM

## 2023-03-09 DIAGNOSIS — F32.A ANXIETY AND DEPRESSION: ICD-10-CM

## 2023-03-09 LAB
ALMOND IGE QN: <0.1 KUA/I
CASHEW NUT IGE QN: <0.1 KUA/I
CODFISH IGE QN: <0.1 KUA/I
EGG WHITE IGE QN: <0.1 KUA/I
GLUTEN IGE QN: <0.1 KUA/I
HAZELNUT IGE QN: <0.1 KUA/L
MILK IGE QN: <0.1 KUA/I
PEANUT IGE QN: <0.1 KUA/I
SALMON IGE QN: <0.1 KUA/I
SCALLOP IGE QN: <0.1 KUA/L
SESAME SEED IGE QN: <0.1 KUA/I
SHRIMP IGE QN: <0.1 KUA/L
SOYBEAN IGE QN: <0.1 KUA/I
TOTAL IGE SMQN RAST: 27.6 KU/L (ref 0–113)
TUNA IGE QN: <0.1 KUA/I
WALNUT IGE QN: <0.1 KUA/I
WHEAT IGE QN: <0.1 KUA/I

## 2023-03-09 RX ORDER — HYDROXYZINE HYDROCHLORIDE 25 MG/1
25 TABLET, FILM COATED ORAL EVERY 6 HOURS PRN
Qty: 90 TABLET | Refills: 0 | Status: SHIPPED | OUTPATIENT
Start: 2023-03-09 | End: 2023-03-09

## 2023-03-09 RX ORDER — HYDROXYZINE HYDROCHLORIDE 25 MG/1
25 TABLET, FILM COATED ORAL EVERY 6 HOURS PRN
Qty: 90 TABLET | Refills: 0 | Status: SHIPPED | OUTPATIENT
Start: 2023-03-09

## 2023-03-09 NOTE — PROGRESS NOTES
[address]    NAME: Sasha Floor  AGE: 25 y o  SEX: male  : 2000     DATE: 3/10/2023     Assessment and Plan:     Problem List Items Addressed This Visit        Digestive    Chronic gastritis without bleeding     Has follow up with GI  Continues to take omeprazole 20mg daily but will forget             Other    Anxiety and depression     Worsening starting Zoloft 25 mg  Patient is very anxious and worries about possible health problems that he may develop  Since starting Zoloft, does not smoke marijuana nearly as much  Social work following  Patient missed call for behavioral therapy: Recommend patient call and sign up for the wait list  Patient has occasional thoughts of being better off dead but does not have any plan and strongly does not want to die  Encourage continue compliance of Zoloft 25 mg and follow-up with virtual visit in 1 week  Handout provided with suicide hotline         Relevant Medications    hydrOXYzine HCL (ATARAX) 25 mg tablet    Insomnia     In the setting of anxiety and depression         Seasonal allergies   Other Visit Diagnoses     Annual physical exam    -  Primary          Immunizations and preventive care screenings were discussed with patient today  Appropriate education was printed on patient's after visit summary  Counseling:  Alcohol/drug use: discussed moderation in alcohol intake, the recommendations for healthy alcohol use, and avoidance of illicit drug use  Dental Health: discussed importance of regular tooth brushing, flossing, and dental visits  Has a dentist appointment scheduled this month  Injury prevention: discussed safety/seat belts, safety helmets, smoke detectors, carbon dioxide detectors, and smoking near bedding or upholstery    Sexual health: discussed sexually transmitted diseases, partner selection, use of condoms, avoidance of unintended pregnancy, and contraceptive alternatives  Exercise: the importance of regular exercise/physical activity was discussed  Recommend exercise 3-5 times per week for at least 30 minutes  BMI Counseling: Body mass index is 17 13 kg/m²  The BMI is below normal  Patient advised to gain weight  Rationale for BMI follow-up plan is due to patient being underweight  Return in about 1 week (around 3/16/2023) for anxiety virtual follow up  Chief Complaint:     Chief Complaint   Patient presents with   • Physical Exam      History of Present Illness:     Adult Annual Physical   Patient here for a comprehensive physical exam  The patient reports problems - anxiety  His main concern is 2 weeks worsening anxiety  Has been on zoloft 25mg for about 1-2 weeks now  He needs to get put on the waitlist for therapy  Diet and Physical Activity  Diet/Nutrition: well balanced diet and consuming 3-5 servings of fruits/vegetables daily  Exercise: no formal exercise  Depression Screening  PHQ-2/9 Depression Screening         General Health  Sleep: irregular sleep schedule  sleeps at 5p-11p  Hearing: normal - bilateral   Vision: no vision problems  Dental: no dental visits for >1 year   Health  History of STDs?: no      Review of Systems:     Review of Systems   Constitutional: Negative for activity change, chills, diaphoresis and fever  HENT: Positive for congestion  Negative for ear pain, hearing loss, postnasal drip, rhinorrhea, sinus pressure, sinus pain, sneezing and sore throat  Respiratory: Negative for cough, chest tightness, shortness of breath and wheezing  Cardiovascular: Negative for chest pain, palpitations and leg swelling  Gastrointestinal: Negative for abdominal pain, blood in stool, constipation, diarrhea, nausea and vomiting  Genitourinary: Negative for dysuria, frequency, hematuria and urgency  Musculoskeletal: Negative for arthralgias and myalgias     Neurological: Negative for dizziness, syncope, weakness, light-headedness, numbness and headaches  Psychiatric/Behavioral: Positive for dysphoric mood  Negative for confusion, decreased concentration, hallucinations, self-injury, sleep disturbance and suicidal ideas  The patient is nervous/anxious  The patient is not hyperactive  Past Medical History:     Past Medical History:   Diagnosis Date   • GERD (gastroesophageal reflux disease)       Past Surgical History:     History reviewed  No pertinent surgical history  Social History:     Social History     Socioeconomic History   • Marital status: Single     Spouse name: None   • Number of children: None   • Years of education: None   • Highest education level: None   Occupational History   • None   Tobacco Use   • Smoking status: Never   • Smokeless tobacco: Never   Vaping Use   • Vaping Use: Never used   Substance and Sexual Activity   • Alcohol use: Not Currently   • Drug use: Yes     Types: Marijuana   • Sexual activity: Yes     Partners: Female   Other Topics Concern   • None   Social History Narrative   • None     Social Determinants of Health     Financial Resource Strain: Not on file   Food Insecurity: Not on file   Transportation Needs: Not on file   Physical Activity: Not on file   Stress: Not on file   Social Connections: Not on file   Intimate Partner Violence: Not on file   Housing Stability: Not on file      Family History:     History reviewed  No pertinent family history     Current Medications:     Current Outpatient Medications   Medication Sig Dispense Refill   • cetirizine (ZyrTEC) 10 mg tablet Take 1 tablet (10 mg total) by mouth daily 30 tablet 0   • fluticasone (FLONASE) 50 mcg/act nasal spray 2 sprays into each nostril daily 18 2 mL 2   • hydrOXYzine HCL (ATARAX) 25 mg tablet Take 1 tablet (25 mg total) by mouth every 6 (six) hours as needed for anxiety 90 tablet 0   • omeprazole (PriLOSEC) 20 mg delayed release capsule Take 1 capsule (20 mg total) by mouth daily before breakfast 30 capsule 3   • sertraline (Zoloft) 25 mg tablet Take 1 tablet (25 mg total) by mouth daily 30 tablet 5     No current facility-administered medications for this visit  Allergies:     No Known Allergies   Physical Exam:     /80 (BP Location: Left arm, Patient Position: Sitting, Cuff Size: Adult)   Pulse 86   Temp 98 3 °F (36 8 °C) (Temporal)   Ht 5' 9" (1 753 m)   Wt 52 6 kg (116 lb)   SpO2 97%   BMI 17 13 kg/m²     Physical Exam  Vitals reviewed  Constitutional:       General: He is not in acute distress  Appearance: He is normal weight  He is not ill-appearing, toxic-appearing or diaphoretic  HENT:      Head: Normocephalic and atraumatic  Right Ear: External ear normal       Left Ear: External ear normal       Nose: Nose normal  No congestion  Mouth/Throat:      Mouth: Mucous membranes are moist       Pharynx: Oropharynx is clear  Eyes:      General: No scleral icterus  Right eye: No discharge  Left eye: No discharge  Cardiovascular:      Rate and Rhythm: Normal rate and regular rhythm  Pulmonary:      Effort: No respiratory distress  Abdominal:      General: Abdomen is flat  Bowel sounds are normal       Palpations: Abdomen is soft  Musculoskeletal:      Cervical back: Normal range of motion and neck supple  Skin:     Coloration: Skin is not pale  Findings: No erythema  Neurological:      Mental Status: He is alert  Mental status is at baseline  Psychiatric:         Attention and Perception: Attention and perception normal          Mood and Affect: Mood is depressed  Affect is tearful  Affect is not labile or flat  Speech: Speech normal          Behavior: Behavior normal  Behavior is cooperative  Thought Content: Thought content normal  Thought content does not include homicidal or suicidal ideation  Thought content does not include homicidal or suicidal plan           Cognition and Memory: Cognition and memory normal          Judgment: Judgment normal           Juventino Mae, DO   [address]

## 2023-03-10 NOTE — ASSESSMENT & PLAN NOTE
Worsening starting Zoloft 25 mg  · Patient is very anxious and worries about possible health problems that he may develop  · Since starting Zoloft, does not smoke marijuana nearly as much  · Social work following  · Patient missed call for behavioral therapy: Recommend patient call and sign up for the wait list  · Patient has occasional thoughts of being better off dead but does not have any plan and strongly does not want to die    · Encourage continue compliance of Zoloft 25 mg and follow-up with virtual visit in 1 week  · Handout provided with suicide hotline None

## 2023-03-14 ENCOUNTER — TELEPHONE (OUTPATIENT)
Dept: FAMILY MEDICINE CLINIC | Facility: CLINIC | Age: 23
End: 2023-03-14

## 2023-03-14 LAB — MISCELLANEOUS LAB TEST RESULT: NORMAL

## 2023-03-16 ENCOUNTER — TELEMEDICINE (OUTPATIENT)
Dept: FAMILY MEDICINE CLINIC | Facility: CLINIC | Age: 23
End: 2023-03-16

## 2023-03-16 DIAGNOSIS — F32.A ANXIETY AND DEPRESSION: ICD-10-CM

## 2023-03-16 DIAGNOSIS — F41.9 ANXIETY AND DEPRESSION: ICD-10-CM

## 2023-03-16 NOTE — PROGRESS NOTES
Virtual Regular Visit    Verification of patient location:    Patient is located in the following state in which I hold an active license [place]      Assessment/Plan:    Problem List Items Addressed This Visit        Other    Anxiety and depression     Unchanged  Patient is doing better during the day and feels more even keeled since starting sertraline  At night he develops anxiety and suicidal ideations  Patient denies plan and states he would never do this as he is afraid to die  Discussed options for patient including emergent hospitalization in [facility] for acute treatment versus continued outpatient treatment  Patient would would like outpatient treatment for now but will consider emergent treatment if symptoms progress despite increase in sertraline  Increase sertraline to 50 mg daily         Relevant Medications    sertraline (Zoloft) 50 mg tablet            Reason for visit is   Chief Complaint   Patient presents with   • Virtual Regular Visit        Encounter provider [facility] Tiny Young 1257, DO    Provider located at [facility] AT [place] [id number]      Recent Visits  Date Type Provider Dept   03/16/23 Telemedicine [facility]   03/14/23 Telephone [facility] recent visits within past 7 days and meeting all other requirements  Future Appointments  No visits were found meeting these conditions  Showing future appointments within next 150 days and meeting all other requirements       The patient was identified by name and date of birth  Emperatrizbeverly Ronan was informed that this is a telemedicine visit and that the visit is being conducted through Telephone  My office door was closed  No one else was in the room  He acknowledged consent and understanding of privacy and security of the video platform   The patient has agreed to participate and understands they can discontinue the visit at any time  Patient is aware this is a billable service  Subjective  Jeff Lee is a 25 y o  male presents today for depression follow-up  Has been on sertraline 25 mg for the past 2 weeks  He reports improvement in his overall mood during waking hours  He does state that at night he has trouble sleeping because he develops suicidal ideations  Patient called the suicide hotline the other night which helped  Patient states he only wanted someone to talk to  He is considering emergent hospitalization for his mental health but is afraid  He has no plans on killing himself  HPI     Past Medical History:   Diagnosis Date   • GERD (gastroesophageal reflux disease)        History reviewed  No pertinent surgical history  Current Outpatient Medications   Medication Sig Dispense Refill   • sertraline (Zoloft) 50 mg tablet Take 1 tablet (50 mg total) by mouth daily 90 tablet 0   • cetirizine (ZyrTEC) 10 mg tablet Take 1 tablet (10 mg total) by mouth daily 30 tablet 0   • fluticasone (FLONASE) 50 mcg/act nasal spray 2 sprays into each nostril daily 18 2 mL 2   • hydrOXYzine HCL (ATARAX) 25 mg tablet Take 1 tablet (25 mg total) by mouth every 6 (six) hours as needed for anxiety 90 tablet 0   • omeprazole (PriLOSEC) 20 mg delayed release capsule Take 1 capsule (20 mg total) by mouth daily before breakfast 30 capsule 3     No current facility-administered medications for this visit  No Known Allergies    Review of Systems   Constitutional: Negative for activity change, chills, diaphoresis and fever  HENT: Negative for ear pain, hearing loss, postnasal drip, rhinorrhea, sinus pressure, sinus pain, sneezing and sore throat  Respiratory: Negative for cough, chest tightness, shortness of breath and wheezing  Cardiovascular: Negative for chest pain, palpitations and leg swelling     Gastrointestinal: Negative for abdominal pain, blood in stool, constipation, diarrhea, nausea and vomiting  Genitourinary: Negative for dysuria, frequency, hematuria and urgency  Musculoskeletal: Negative for arthralgias and myalgias  Neurological: Negative for dizziness, syncope, weakness, light-headedness, numbness and headaches  Psychiatric/Behavioral: Positive for dysphoric mood, sleep disturbance and suicidal ideas  Negative for agitation, behavioral problems, confusion, decreased concentration, hallucinations and self-injury  The patient is nervous/anxious  The patient is not hyperactive  Video Exam    There were no vitals filed for this visit  Physical Exam  Psychiatric:         Attention and Perception: Attention and perception normal          Mood and Affect: Mood is depressed  Mood is not anxious  Affect is tearful  Speech: Speech normal          Behavior: Behavior normal  Behavior is cooperative  Thought Content: Thought content is not paranoid or delusional  Thought content includes suicidal ideation  Thought content does not include homicidal ideation  Thought content does not include homicidal or suicidal plan           Cognition and Memory: Cognition and memory normal           I spent 30 minutes directly with the patient during this visit

## 2023-03-16 NOTE — ASSESSMENT & PLAN NOTE
Unchanged  · Patient is doing better during the day and feels more even keeled since starting sertraline  · At night he develops anxiety and suicidal ideations    · Patient denies plan and states he would never do this as he is afraid to die  · Discussed options for patient including emergent hospitalization in [facility] for acute treatment versus continued outpatient treatment  · Patient would would like outpatient treatment for now but will consider emergent treatment if symptoms progress despite increase in sertraline  · Increase sertraline to 50 mg daily

## 2023-03-22 ENCOUNTER — HOSPITAL ENCOUNTER (EMERGENCY)
Facility: HOSPITAL | Age: 23
End: 2023-03-23
Attending: EMERGENCY MEDICINE | Admitting: EMERGENCY MEDICINE

## 2023-03-22 DIAGNOSIS — R11.2 NAUSEA & VOMITING: ICD-10-CM

## 2023-03-22 DIAGNOSIS — F32.A DEPRESSION WITH SUICIDAL IDEATION: Primary | ICD-10-CM

## 2023-03-22 DIAGNOSIS — R45.851 DEPRESSION WITH SUICIDAL IDEATION: Primary | ICD-10-CM

## 2023-03-22 LAB
AMPHETAMINES SERPL QL SCN: NEGATIVE
ANION GAP SERPL CALCULATED.3IONS-SCNC: 9 MMOL/L (ref 4–13)
BARBITURATES UR QL: NEGATIVE
BASOPHILS # BLD AUTO: 0.02 THOUSANDS/ÂΜL (ref 0–0.1)
BASOPHILS NFR BLD AUTO: 0 % (ref 0–1)
BENZODIAZ UR QL: NEGATIVE
BUN SERPL-MCNC: 8 MG/DL (ref 5–25)
CALCIUM SERPL-MCNC: 8.8 MG/DL (ref 8.4–10.2)
CHLORIDE SERPL-SCNC: 110 MMOL/L (ref 96–108)
CO2 SERPL-SCNC: 23 MMOL/L (ref 21–32)
COCAINE UR QL: NEGATIVE
CREAT SERPL-MCNC: 0.79 MG/DL (ref 0.6–1.3)
EOSINOPHIL # BLD AUTO: 0.05 THOUSAND/ÂΜL (ref 0–0.61)
EOSINOPHIL NFR BLD AUTO: 1 % (ref 0–6)
ERYTHROCYTE [DISTWIDTH] IN BLOOD BY AUTOMATED COUNT: 12.5 % (ref 11.6–15.1)
ETHANOL EXG-MCNC: 0 MG/DL
FLUAV RNA RESP QL NAA+PROBE: NEGATIVE
FLUBV RNA RESP QL NAA+PROBE: NEGATIVE
GFR SERPL CREATININE-BSD FRML MDRD: 128 ML/MIN/1.73SQ M
GLUCOSE SERPL-MCNC: 111 MG/DL (ref 65–140)
HCT VFR BLD AUTO: 40.9 % (ref 36.5–49.3)
HGB BLD-MCNC: 13.9 G/DL (ref 12–17)
IMM GRANULOCYTES # BLD AUTO: 0.01 THOUSAND/UL (ref 0–0.2)
IMM GRANULOCYTES NFR BLD AUTO: 0 % (ref 0–2)
LIPASE SERPL-CCNC: 6 U/L (ref 11–82)
LYMPHOCYTES # BLD AUTO: 0.93 THOUSANDS/ÂΜL (ref 0.6–4.47)
LYMPHOCYTES NFR BLD AUTO: 20 % (ref 14–44)
MAGNESIUM SERPL-MCNC: 1.6 MG/DL (ref 1.9–2.7)
MCH RBC QN AUTO: 30 PG (ref 26.8–34.3)
MCHC RBC AUTO-ENTMCNC: 34 G/DL (ref 31.4–37.4)
MCV RBC AUTO: 88 FL (ref 82–98)
METHADONE UR QL: NEGATIVE
MONOCYTES # BLD AUTO: 0.33 THOUSAND/ÂΜL (ref 0.17–1.22)
MONOCYTES NFR BLD AUTO: 7 % (ref 4–12)
NEUTROPHILS # BLD AUTO: 3.41 THOUSANDS/ÂΜL (ref 1.85–7.62)
NEUTS SEG NFR BLD AUTO: 72 % (ref 43–75)
NRBC BLD AUTO-RTO: 0 /100 WBCS
OPIATES UR QL SCN: NEGATIVE
OXYCODONE+OXYMORPHONE UR QL SCN: NEGATIVE
PCP UR QL: NEGATIVE
PLATELET # BLD AUTO: 179 THOUSANDS/UL (ref 149–390)
PMV BLD AUTO: 10.4 FL (ref 8.9–12.7)
POTASSIUM SERPL-SCNC: 3 MMOL/L (ref 3.5–5.3)
RBC # BLD AUTO: 4.63 MILLION/UL (ref 3.88–5.62)
RSV RNA RESP QL NAA+PROBE: NEGATIVE
SARS-COV-2 RNA RESP QL NAA+PROBE: NEGATIVE
SODIUM SERPL-SCNC: 142 MMOL/L (ref 135–147)
THC UR QL: POSITIVE
TSH SERPL DL<=0.05 MIU/L-ACNC: 1.66 UIU/ML (ref 0.45–4.5)
WBC # BLD AUTO: 4.75 THOUSAND/UL (ref 4.31–10.16)

## 2023-03-22 RX ORDER — POTASSIUM CHLORIDE 20 MEQ/1
40 TABLET, EXTENDED RELEASE ORAL ONCE
Status: COMPLETED | OUTPATIENT
Start: 2023-03-22 | End: 2023-03-22

## 2023-03-22 RX ORDER — HYDROXYZINE HYDROCHLORIDE 25 MG/1
25 TABLET, FILM COATED ORAL EVERY 6 HOURS PRN
Status: DISCONTINUED | OUTPATIENT
Start: 2023-03-22 | End: 2023-03-23 | Stop reason: HOSPADM

## 2023-03-22 RX ORDER — LORAZEPAM 2 MG/ML
2 INJECTION INTRAMUSCULAR ONCE
Status: COMPLETED | OUTPATIENT
Start: 2023-03-22 | End: 2023-03-22

## 2023-03-22 RX ORDER — MAGNESIUM HYDROXIDE/ALUMINUM HYDROXICE/SIMETHICONE 120; 1200; 1200 MG/30ML; MG/30ML; MG/30ML
30 SUSPENSION ORAL EVERY 4 HOURS PRN
Status: DISCONTINUED | OUTPATIENT
Start: 2023-03-22 | End: 2023-03-23 | Stop reason: HOSPADM

## 2023-03-22 RX ORDER — ONDANSETRON 2 MG/ML
4 INJECTION INTRAMUSCULAR; INTRAVENOUS ONCE
Status: COMPLETED | OUTPATIENT
Start: 2023-03-22 | End: 2023-03-22

## 2023-03-22 RX ORDER — ONDANSETRON 4 MG/1
4 TABLET, ORALLY DISINTEGRATING ORAL ONCE
Status: COMPLETED | OUTPATIENT
Start: 2023-03-22 | End: 2023-03-22

## 2023-03-22 RX ORDER — UREA 10 %
500 LOTION (ML) TOPICAL ONCE
Status: COMPLETED | OUTPATIENT
Start: 2023-03-22 | End: 2023-03-22

## 2023-03-22 RX ORDER — LORAZEPAM 1 MG/1
2 TABLET ORAL EVERY 6 HOURS PRN
Status: DISCONTINUED | OUTPATIENT
Start: 2023-03-22 | End: 2023-03-23 | Stop reason: HOSPADM

## 2023-03-22 RX ORDER — IBUPROFEN 400 MG/1
400 TABLET ORAL EVERY 8 HOURS PRN
Status: DISCONTINUED | OUTPATIENT
Start: 2023-03-22 | End: 2023-03-22

## 2023-03-22 RX ORDER — ACETAMINOPHEN 325 MG/1
650 TABLET ORAL EVERY 8 HOURS PRN
Status: DISCONTINUED | OUTPATIENT
Start: 2023-03-22 | End: 2023-03-23 | Stop reason: HOSPADM

## 2023-03-22 RX ORDER — LANOLIN ALCOHOL/MO/W.PET/CERES
3 CREAM (GRAM) TOPICAL
Status: DISCONTINUED | OUTPATIENT
Start: 2023-03-22 | End: 2023-03-23 | Stop reason: HOSPADM

## 2023-03-22 RX ORDER — DIPHENHYDRAMINE HCL 25 MG
25 TABLET ORAL EVERY 8 HOURS PRN
Status: DISCONTINUED | OUTPATIENT
Start: 2023-03-22 | End: 2023-03-23 | Stop reason: HOSPADM

## 2023-03-22 RX ORDER — IBUPROFEN 400 MG/1
400 TABLET ORAL EVERY 8 HOURS PRN
Status: DISCONTINUED | OUTPATIENT
Start: 2023-03-22 | End: 2023-03-23 | Stop reason: HOSPADM

## 2023-03-22 RX ADMIN — HYDROXYZINE HYDROCHLORIDE 25 MG: 25 TABLET, FILM COATED ORAL at 23:45

## 2023-03-22 RX ADMIN — LORAZEPAM 2 MG: 2 INJECTION INTRAMUSCULAR; INTRAVENOUS at 20:57

## 2023-03-22 RX ADMIN — ONDANSETRON 4 MG: 2 INJECTION INTRAMUSCULAR; INTRAVENOUS at 20:59

## 2023-03-22 RX ADMIN — Medication 500 MG: at 21:57

## 2023-03-22 RX ADMIN — ONDANSETRON 4 MG: 4 TABLET, ORALLY DISINTEGRATING ORAL at 19:21

## 2023-03-22 RX ADMIN — POTASSIUM CHLORIDE 40 MEQ: 1500 TABLET, EXTENDED RELEASE ORAL at 21:57

## 2023-03-22 RX ADMIN — Medication 3 MG: at 23:45

## 2023-03-22 RX ADMIN — SODIUM CHLORIDE 1000 ML: 0.9 INJECTION, SOLUTION INTRAVENOUS at 20:56

## 2023-03-22 RX ADMIN — LORAZEPAM 2 MG: 1 TABLET ORAL at 19:22

## 2023-03-22 NOTE — ED NOTES
Patient is accepted at [facility]  Patient is accepted by Dr Ellie Fuentes per Bertha Sexton in Intake  Transportation is arranged with [facility]  Transportation is scheduled for 1200  Patient may go to the floor at [phone]  Patient notified of acceptance      Woody Escobar  Crisis Intervention Specialist II  03/22/23

## 2023-03-22 NOTE — ED NOTES
[facility] Signed- Rights explained, bed search explained- Faxed to [facility]- Original on chart      Carey Chung  Crisis Intervention Specialist II  03/22/23

## 2023-03-22 NOTE — ED NOTES
PC to [facility] for bed search  Spoke to [address] [place] [address], there are 1 or 2 beds available for tomorrow  Faxed info to [facility] for review

## 2023-03-22 NOTE — ED NOTES
Patient presents to the Emergency Department via self referral and accompanied by his mother and father  Patient reports increasing suicidal ideation, depression and anxiety  Patient is calm and cooperative at this time, and agrees to speak with this writer  Patient reports that he has been struggling with suicidal ideation for a long time, and lately it is becoming more and more overwhelming  Patient reports that he does not want to die, but the thoughts are becoming more intrusive  Patient reports he has had plans on how he would end his life, but takes steps to avoid those scenarios  Patient reports when he had thoughts of crashing his car he stopped driving, and when he had thoughts of overdosing on his medication he wouldn't take his medication  Patient reports that every night is worse than the last, and he fears what he will do  Patient identifies familial issues as one of his stressors  Patient denies homicidal ideation and auditory/visual/tactile hallucinations  Patient reports he has been prescribed Zoloft and has been taking it for about a month with minimal effect  Patient reports he sees a therapist once a month through [facility], but there is a language barrier and he is not feeling as if the therapy is helping  Patient does want to find another therapist, but has been told the wait lists are several months and up to a year  Patient reports major disruptions in his sleep patterns, sometimes oversleeping and sometimes only getting an hour or two  Patient reports a decreased appetite, and reports that he has not been completing his ADL's daily  Patient is willing to sign himself in for inpatient treatment to ensure his safety and get the help he needs      Sam Law  Crisis Intervention Specialist II  03/22/23

## 2023-03-22 NOTE — ED PROVIDER NOTES
History  Chief Complaint   Patient presents with   • Psychiatric Evaluation     Pt reports increasing suicidal thoughts without specific plan, anxiety, and depression  Denies AH, VH     Patient is a 55-year-old male coming in today with mother and father bedside  However patient does not wish to speak at length in front of parents  They are placed in family room  Seen with crisis at bedside  Patient has stated that he has been dealing with anxiety and depression most of his life  Over the past several years progressively worsening  He states he talk to his family doctor and was recently placed on Zoloft for approximately 1 month  He has been taking it  He also sees a counselor once a month but feels this is not helpful as there is a barrier with some language as well  He states over the past several months to years he gets to the point where every day is a struggle for him to get up  He states nighttime is worse  He has very irregular sleeping patterns  He does not wish to eat  He states that he cannot go to the dentist or even take care of himself to go out with his family or friends  He has lost interest in most things that used to make him joyful  He states that he has thoughts of wanting to hurt himself but is never attempted suicide  He states "last night I did not take my Zoloft because I was afraid I was going to take the whole bottle  I do not drive because sometimes I am afraid I am just going to crash the car"  He has no homicidal ideation, visual hallucinations or auditory hallucinations      History provided by:  Patient  Depression  Presenting symptoms: depression and suicidal thoughts    Presenting symptoms: no delusions    Degree of incapacity (severity):  Severe  Onset quality:  Gradual  Timing:  Constant  Progression:  Worsening  Chronicity:  Recurrent  Treatment compliance:   All of the time  Relieved by:  Nothing  Worsened by:  Nothing  Ineffective treatments:  None tried  Associated symptoms: anxiety, appetite change, decreased need for sleep, feelings of worthlessness, hypersomnia, insomnia and poor judgment    Associated symptoms: no abdominal pain and no chest pain    Risk factors: hx of mental illness    Risk factors: no hx of suicide attempts, no neurological disease and no recent psychiatric admission        Prior to Admission Medications   Prescriptions Last Dose Informant Patient Reported? Taking? cetirizine (ZyrTEC) 10 mg tablet Past Week  No Yes   Sig: Take 1 tablet (10 mg total) by mouth daily   fluticasone (FLONASE) 50 mcg/act nasal spray Past Week  No Yes   Si sprays into each nostril daily   hydrOXYzine HCL (ATARAX) 25 mg tablet Past Week  No Yes   Sig: Take 1 tablet (25 mg total) by mouth every 6 (six) hours as needed for anxiety   omeprazole (PriLOSEC) 20 mg delayed release capsule Past Week  No Yes   Sig: Take 1 capsule (20 mg total) by mouth daily before breakfast   sertraline (Zoloft) 50 mg tablet Past Week  No Yes   Sig: Take 1 tablet (50 mg total) by mouth daily      Facility-Administered Medications: None       Past Medical History:   Diagnosis Date   • GERD (gastroesophageal reflux disease)        History reviewed  No pertinent surgical history  History reviewed  No pertinent family history  I have reviewed and agree with the history as documented  E-Cigarette/Vaping   • E-Cigarette Use Never User      E-Cigarette/Vaping Substances   • Nicotine No    • THC No    • CBD No    • Flavoring No    • Other No    • Unknown No      Social History     Tobacco Use   • Smoking status: Never   • Smokeless tobacco: Never   Vaping Use   • Vaping Use: Never used   Substance Use Topics   • Alcohol use: Not Currently   • Drug use: Yes     Types: Marijuana       Review of Systems   Constitutional: Positive for appetite change  Negative for chills and fever  HENT: Negative  Negative for ear pain and sore throat      Eyes: Negative for pain and visual disturbance  Respiratory: Negative  Negative for cough and shortness of breath  Cardiovascular: Negative  Negative for chest pain and palpitations  Gastrointestinal: Negative  Negative for abdominal pain and vomiting  Genitourinary: Negative  Negative for dysuria and hematuria  Musculoskeletal: Negative  Negative for arthralgias and back pain  Skin: Negative for color change and rash  Neurological: Negative for seizures and syncope  Hematological: Negative  Psychiatric/Behavioral: Positive for depression and suicidal ideas  The patient is nervous/anxious and has insomnia  All other systems reviewed and are negative  Physical Exam  Physical Exam  Vitals and nursing note reviewed  Constitutional:       General: He is not in acute distress  Appearance: He is well-developed  HENT:      Head: Normocephalic and atraumatic  Comments: Patient maintaining airway and secretions  No stridor   No brawniness under tongue  Mouth/Throat:      Mouth: Mucous membranes are moist    Eyes:      Extraocular Movements: Extraocular movements intact  Conjunctiva/sclera: Conjunctivae normal       Pupils: Pupils are equal, round, and reactive to light  Cardiovascular:      Rate and Rhythm: Normal rate and regular rhythm  Heart sounds: No murmur heard  Pulmonary:      Effort: Pulmonary effort is normal  No respiratory distress  Breath sounds: Normal breath sounds  Abdominal:      Palpations: Abdomen is soft  Tenderness: There is no abdominal tenderness  Musculoskeletal:         General: No swelling  Cervical back: Neck supple  Skin:     General: Skin is warm and dry  Capillary Refill: Capillary refill takes less than 2 seconds  Neurological:      General: No focal deficit present  Mental Status: He is alert and oriented to person, place, and time  GCS: GCS eye subscore is 4  GCS verbal subscore is 5  GCS motor subscore is 6        Cranial Nerves: Cranial nerves 2-12 are intact  Sensory: Sensation is intact  Motor: Motor function is intact  Coordination: Coordination is intact  Gait: Gait is intact  Comments: No slurred speech  No facial asymmetry  No tongue deviation   Psychiatric:         Attention and Perception: Attention normal          Mood and Affect: Mood is anxious  Affect is flat and tearful  Behavior: Behavior is withdrawn  Thought Content: Thought content includes suicidal ideation           Cognition and Memory: Cognition normal          Judgment: Judgment normal          Vital Signs  ED Triage Vitals   Temperature Pulse Respirations Blood Pressure SpO2   03/22/23 1706 03/22/23 1706 03/22/23 1706 03/22/23 1706 03/22/23 1706   98 1 °F (36 7 °C) 82 18 138/95 98 %      Temp Source Heart Rate Source Patient Position - Orthostatic VS BP Location FiO2 (%)   03/22/23 1706 03/22/23 2000 03/22/23 2000 03/22/23 2000 --   Oral Monitor Sitting Right arm       Pain Score       03/22/23 1706       No Pain           Vitals:    03/22/23 1706 03/22/23 2000 03/23/23 0000   BP: 138/95 99/69 99/59   Pulse: 82 93 89   Patient Position - Orthostatic VS:  Sitting Lying         Visual Acuity      ED Medications  Medications   acetaminophen (TYLENOL) tablet 650 mg (has no administration in time range)   melatonin tablet 3 mg (3 mg Oral Given 3/22/23 2345)   hydrOXYzine HCL (ATARAX) tablet 25 mg (25 mg Oral Given 3/22/23 2345)   diphenhydrAMINE (BENADRYL) tablet 25 mg (has no administration in time range)   aluminum-magnesium hydroxide-simethicone (MYLANTA) oral suspension 30 mL (has no administration in time range)   LORazepam (ATIVAN) tablet 2 mg (2 mg Oral Given 3/22/23 1922)   ibuprofen (MOTRIN) tablet 400 mg (has no administration in time range)   ondansetron (ZOFRAN-ODT) dispersible tablet 4 mg (4 mg Oral Given 3/22/23 1921)   sodium chloride 0 9 % bolus 1,000 mL (0 mL Intravenous Stopped 3/22/23 2145)   LORazepam (ATIVAN) injection 2 mg (2 mg Intravenous Given 3/22/23 2057)   ondansetron (ZOFRAN) injection 4 mg (4 mg Intravenous Given 3/22/23 2059)   potassium chloride (K-DUR,KLOR-CON) CR tablet 40 mEq (40 mEq Oral Given 3/22/23 2157)   magnesium gluconate (MAGONATE) tablet 500 mg (500 mg Oral Given 3/22/23 2157)       Diagnostic Studies  Results Reviewed     Procedure Component Value Units Date/Time    TSH [[id number]]  (Normal) Collected: 03/22/23 2111    Lab Status: Final result Specimen: Blood from Arm, Right Updated: 03/22/23 2151     TSH 3RD GENERATON 1 664 uIU/mL     Basic metabolic panel [[id number]]  (Abnormal) Collected: 03/22/23 2111    Lab Status: Final result Specimen: Blood from Arm, Right Updated: 03/22/23 2136     Sodium 142 mmol/L      Potassium 3 0 mmol/L      Chloride 110 mmol/L      CO2 23 mmol/L      ANION GAP 9 mmol/L      BUN 8 mg/dL      Creatinine 0 79 mg/dL      Glucose 111 mg/dL      Calcium 8 8 mg/dL      eGFR 128 ml/min/1 73sq m     Narrative:      [place] guidelines for Chronic Kidney Disease (CKD):   •  Stage 1 with normal or high GFR (GFR > 90 mL/min/1 73 square meters)  •  Stage 2 Mild CKD (GFR = 60-89 mL/min/1 73 square meters)  •  Stage 3A Moderate CKD (GFR = 45-59 mL/min/1 73 square meters)  •  Stage 3B Moderate CKD (GFR = 30-44 mL/min/1 73 square meters)  •  Stage 4 Severe CKD (GFR = 15-29 mL/min/1 73 square meters)  •  Stage 5 End Stage CKD (GFR <15 mL/min/1 73 square meters)  Note: GFR calculation is accurate only with a steady state creatinine    Lipase [[id number]]  (Abnormal) Collected: 03/22/23 2111    Lab Status: Final result Specimen: Blood from Arm, Right Updated: 03/22/23 2136     Lipase 6 u/L     Magnesium [[id number]]  (Abnormal) Collected: 03/22/23 2111    Lab Status: Final result Specimen: Blood from Arm, Right Updated: 03/22/23 2136     Magnesium 1 6 mg/dL     FLU/RSV/COVID - if FLU/RSV clinically relevant [[id number]]  (Normal) Collected: 03/22/23 2018    Lab Status: Final result Specimen: Nares from Nose Updated: 03/22/23 2127     SARS-CoV-2 Negative     INFLUENZA A PCR Negative     INFLUENZA B PCR Negative     RSV PCR Negative    Narrative:      FOR PEDIATRIC PATIENTS - copy/paste COVID Guidelines URL to browser: [email or web address]  ashx    SARS-CoV-2 assay is a Nucleic Acid Amplification assay intended for the  qualitative detection of nucleic acid from SARS-CoV-2 in nasopharyngeal  swabs  Results are for the presumptive identification of SARS-CoV-2 RNA  Positive results are indicative of infection with SARS-CoV-2, the virus  causing COVID-19, but do not rule out bacterial infection or co-infection  with other viruses  Laboratories within the [place] and its  territories are required to report all positive results to the appropriate  public health authorities  Negative results do not preclude SARS-CoV-2  infection and should not be used as the sole basis for treatment or other  patient management decisions  Negative results must be combined with  clinical observations, patient history, and epidemiological information  This test has not been FDA cleared or approved  This test has been authorized by FDA under an Emergency Use Authorization  (EUA)  This test is only authorized for the duration of time the  declaration that circumstances exist justifying the authorization of the  emergency use of an in vitro diagnostic tests for detection of SARS-CoV-2  virus and/or diagnosis of COVID-19 infection under section 564(b)(1) of  the Act, 21 U  S C  263CZO-3(W)(3), unless the authorization is terminated  or revoked sooner  The test has been validated but independent review by FDA  and CLIA is pending  Test performed using [employer] GeneXpert: This RT-PCR assay targets N2,  a region unique to SARS-CoV-2   A conserved region in the E-gene was chosen  for pan-Sarbecovirus detection which includes SARS-CoV-2  According to CMS-2020-01-R, this platform meets the definition of high-throughput technology  CBC and differential [[id number]] Collected: 03/22/23 2111    Lab Status: Final result Specimen: Blood from Arm, Right Updated: 03/22/23 2118     WBC 4 75 Thousand/uL      RBC 4 63 Million/uL      Hemoglobin 13 9 g/dL      Hematocrit 40 9 %      MCV 88 fL      MCH 30 0 pg      MCHC 34 0 g/dL      RDW 12 5 %      MPV 10 4 fL      Platelets 764 Thousands/uL      nRBC 0 /100 WBCs      Neutrophils Relative 72 %      Immat GRANS % 0 %      Lymphocytes Relative 20 %      Monocytes Relative 7 %      Eosinophils Relative 1 %      Basophils Relative 0 %      Neutrophils Absolute 3 41 Thousands/µL      Immature Grans Absolute 0 01 Thousand/uL      Lymphocytes Absolute 0 93 Thousands/µL      Monocytes Absolute 0 33 Thousand/µL      Eosinophils Absolute 0 05 Thousand/µL      Basophils Absolute 0 02 Thousands/µL     POCT alcohol breath test [[id number]]  (Normal) Resulted: 03/22/23 1904    Lab Status: Final result Updated: 03/22/23 1904     EXTBreath Alcohol 0 0    Rapid drug screen, urine [[id number]]  (Abnormal) Collected: 03/22/23 1829    Lab Status: Final result Specimen: Urine, Clean Catch Updated: 03/22/23 1900     Amph/Meth UR Negative     Barbiturate Ur Negative     Benzodiazepine Urine Negative     Cocaine Urine Negative     Methadone Urine Negative     Opiate Urine Negative     PCP Ur Negative     THC Urine Positive     Oxycodone Urine Negative    Narrative:      Presumptive report  If requested, specimen will be sent to reference lab for confirmation  FOR MEDICAL PURPOSES ONLY  IF CONFIRMATION NEEDED PLEASE CONTACT THE LAB WITHIN 5 DAYS      Drug Screen Cutoff Levels:  AMPHETAMINE/METHAMPHETAMINES  1000 ng/mL  BARBITURATES     200 ng/mL  BENZODIAZEPINES     200 ng/mL  COCAINE      300 ng/mL  METHADONE      300 ng/mL  OPIATES      300 ng/mL  PHENCYCLIDINE     25 ng/mL  THC       50 ng/mL  OXYCODONE      100 ng/mL                 No orders to display              Procedures  Procedures         ED Course  ED Course as of 03/23/23 0154   Wed Mar 22, 2023   [age]y/o year-old male complaing in today with increased anxiety depression with vegetative symptoms  On exam he is tearful, withdrawn  Will start [facility] work up  Disclosure: Voice to text software was used in the preparation of this document and could have resulted in translational errors       Occasional wrong word or "sound a like" substitutions may have occurred due to the inherent limitations of voice recognition software   Read the chart carefully and recognize, using context, where substitutions have occurred         1815 Patient signed [address] alcohol negative  Positive for marijuana which he has submitted to a marijuana card   2033 Patient telling staff that he wants to leave  Patient is vomiting in the hallway  Will give IV fluids check CBC CMP lipase mag as well as TSH  Will give IV Ativan and Zofran as well    2674 Patient finished IV fluids and is much improved  Patient is medically clear for inpatient psychiatric admission and evaluation   Thu Mar 23, 2023   0153 No events  Pending transport in the morning  Will sign out to night team                               SBIRT 22yo+    Flowsheet Row Most Recent Value   SBIRT (25 yo +)    In order to provide better care to our patients, we are screening all of our patients for alcohol and drug use  Would it be okay to ask you these screening questions? No Filed at: 03/22/2023 2356                    Medical Decision Making  Depression with suicidal ideation: acute illness or injury  Nausea & vomiting: acute illness or injury  Amount and/or Complexity of Data Reviewed  Independent Historian: parent     Details: Mother and father at bedside  Labs: ordered  Decision-making details documented in ED Course       Details: Mild hypokalemia and hypomagnesia which will be replaced after persistent vomiting  Risk  OTC drugs  Prescription drug management  Decision regarding hospitalization  Disposition  Final diagnoses:   Depression with suicidal ideation   Nausea & vomiting     Time reflects when diagnosis was documented in both MDM as applicable and the Disposition within this note     Time User Action Codes Description Comment    3/22/2023  5:51 PM Max Hernandez Add Vanna LIZAMA,  R45 851] Depression with suicidal ideation     3/22/2023  9:47 PM Liza Samuel Add [R11 2] Nausea & vomiting       ED Disposition     ED Disposition   Transfer to [facility]    Condition   --    Date/Time   Wed Mar 22, 2023  5:52 PM    Comment   Mumtaz Maldonado should be transferred out to [facility] and has been medically cleared  MD Morales Klein Most Recent Value   Accepting Physician Dr Ulysses Reynolds Name, [address], [facility]    (Name & Tel number) Lora Brito [phone]   Transported by Patria Maloney and Unit #) [facility]   Sending MD Dr Miriam Vaelnte Name, [address], [facility]    (Name & Tel number) Lora Brito [phone]   Transported by Patria Maloney and Unit #) [facility]      Follow-up Information    None         Patient's Medications   Discharge Prescriptions    No medications on file       No discharge procedures on file      PDMP Review     None          ED Provider  Electronically Signed by           Joshua Schaffer DO  03/23/23 4899

## 2023-03-23 VITALS
OXYGEN SATURATION: 98 % | BODY MASS INDEX: 17.09 KG/M2 | TEMPERATURE: 97.5 F | SYSTOLIC BLOOD PRESSURE: 101 MMHG | DIASTOLIC BLOOD PRESSURE: 76 MMHG | WEIGHT: 115.74 LBS | HEART RATE: 81 BPM | RESPIRATION RATE: 16 BRPM

## 2023-03-23 RX ORDER — ONDANSETRON 4 MG/1
4 TABLET, ORALLY DISINTEGRATING ORAL ONCE
Status: COMPLETED | OUTPATIENT
Start: 2023-03-23 | End: 2023-03-23

## 2023-03-23 RX ADMIN — ONDANSETRON 4 MG: 4 TABLET, ORALLY DISINTEGRATING ORAL at 10:26

## 2023-03-23 NOTE — ED NOTES
VS and reassessment deferred at this time as pt is sleeping soundly     Castro Padron RN  03/23/23 007

## 2023-03-23 NOTE — ED NOTES
Assumed care of patient at this time   Pt ambulatory to restroom and then resting comfortably in room in [address]  03/23/23 1883

## 2023-03-23 NOTE — ED NOTES
Pt denies wanting to order food at this time   RN will check again in a little bit      Charleen Canavan, ELISABETH  03/23/23 9110

## 2023-03-23 NOTE — ED NOTES
Important Phone #s for pt:     Mom [phone]  Dad [phone]  [facility] [phone]     Lia Simmons RN  03/22/23 7481

## 2023-03-23 NOTE — ED NOTES
Parents back with food for pt  Food checked for utensils/contraband  Parents at bedside and girlfriend at bedside still and everyone has been advised that policy dictates that visitors must leave ER shortly  Parents cooperative a this time  Dad wrote phone numbers down for pt and paper given to pt         Lazaro Duke RN  03/22/23 7584

## 2023-03-23 NOTE — ED NOTES
Mother called to check on pt, advised that pt is currently sleeping  Mom states "He has never been by himself "  Mother states she will call back in awhile to check on pt again        Mehul Brandon RN  03/23/23 9892

## 2023-03-23 NOTE — ED NOTES
Pt med rec completed at this time  Pt stated the last time he took his daily meds was 2 days ago  Pt reports recent increase in zoloft from 25 mg to 50 mg and addition of atarax  Pt has not taken any doses of atarax        Cate Koch RN  03/22/23 9737

## 2023-03-23 NOTE — ED NOTES
Pt belongings placed in room 20 locker     Juanita Shows, [facility]  03/22/23 6808

## 2023-03-23 NOTE — ED NOTES
Pt's parents left bedside at this time to get food for pt  Pt's significant other remains at bedside  Pt provided with warm blanket        Sil Faustin RN  03/22/23 9420

## 2023-03-23 NOTE — ED NOTES
Assumed care of pt at this time  Pt arguing that he did not understand that signing a 201 meant that he had to stay and he states he doesn't want to stay at this time  Pt advised that the 201 was clearly presented to him by CW and that any attempt for him to leave would result in the physician changing his commitment to a non-voluntary 302, as was previously explained to him  Pt argumentative but stays in room        Pau Mcrae, ELISABETH  03/22/23 3979

## 2023-03-23 NOTE — EMTALA/ACUTE CARE TRANSFER
[facility]  [address] [address]  Dept: [phone]      EMTALA TRANSFER CONSENT    NAME Mary Galvin                                         2000                              MRN [id number]    I have been informed of my rights regarding examination, treatment, and transfer   by Dr Hugo Chew DO    Benefits:      Risks:        Consent for Transfer:  I acknowledge that my medical condition has been evaluated and explained to me by the emergency department physician or other qualified medical person and/or my attending physician, who has recommended that I be transferred to the service of  Accepting Physician: Dr Yasmin Salazar at [address] Name, [address] : [place], [facility]  The above potential benefits of such transfer, the potential risks associated with such transfer, and the probable risks of not being transferred have been explained to me, and I fully understand them  The doctor has explained that, in my case, the benefits of transfer outweigh the risks  I agree to be transferred  I authorize the performance of emergency medical procedures and treatments upon me in both transit and upon arrival at the receiving facility  Additionally, I authorize the release of any and all medical records to the receiving facility and request they be transported with me, if possible  I understand that the safest mode of transportation during a medical emergency is an ambulance and that the Hospital advocates the use of this mode of transport  Risks of traveling to the receiving facility by car, including absence of medical control, life sustaining equipment, such as oxygen, and medical personnel has been explained to me and I fully understand them  (KAROL CORRECT BOX BELOW)  [  ]  I consent to the stated transfer and to be transported by ambulance/helicopter    [  ]  I consent to the stated transfer, but refuse transportation by ambulance and accept full responsibility for my transportation by car  I understand the risks of non-ambulance transfers and I exonerate the Hospital and its staff from any deterioration in my condition that results from this refusal     X___________________________________________    DATE  23  TIME________  Signature of patient or legally responsible individual signing on patient behalf           RELATIONSHIP TO PATIENT_________________________          Provider Certification    NAME Dane Hernandez                                         2000                              MRN [id number]    A medical screening exam was performed on the above named patient  Based on the examination:    Condition Necessitating Transfer The primary encounter diagnosis was Depression with suicidal ideation  A diagnosis of Nausea & vomiting was also pertinent to this visit  Patient Condition:      Reason for Transfer:      Transfer Requirements: [employer] [place]   · Space available and qualified personnel available for treatment as acknowledged by Damaris Medrano [phone]  · Agreed to accept transfer and to provide appropriate medical treatment as acknowledged by       Dr Reina Gonzales  · Appropriate medical records of the examination and treatment of the patient are provided at the time of transfer   [address] [facility], [address] _______  · Transfer will be performed by qualified personnel from [address] [place] [address]  and appropriate transfer equipment as required, including the use of necessary and appropriate life support measures      Provider Certification: I have examined the patient and explained the following risks and benefits of being transferred/refusing transfer to the patient/family:         Based on these reasonable risks and benefits to the patient and/or the unborn child(jackie), and based upon the information available at the time of the patient’s examination, I certify that the medical benefits reasonably to be expected from the provision of appropriate medical treatments at another medical facility outweigh the increasing risks, if any, to the individual’s medical condition, and in the case of labor to the unborn child, from effecting the transfer      X____________________________________________ DATE 03/22/23        TIME_______      ORIGINAL - SEND TO MEDICAL RECORDS   COPY - SEND WITH PATIENT DURING TRANSFER

## 2023-03-23 NOTE — ED NOTES
Parents and girlfriend left room 21, pt crying, parents crying because dad states "I have never spent a night away from him"  Offered pt comfort measures, he declines at this time but is agreeable to having some medications that may help with his separation anxiety        Javier Das RN  03/22/23 1338

## 2023-03-23 NOTE — ED NOTES
Pt's mo called again to see how he is doing  Advised her that pt continues to sleep soundly        Phyllistine ELISABETH Arnold  03/23/23 7877

## 2023-03-23 NOTE — ED NOTES
Pt starting to calm down, states he is going to try to sleep          Trinity Hand, ELISABETH  03/23/23 0001

## 2023-03-23 NOTE — ED CARE HANDOFF
Emergency Department Sign Out Note        Sign out and transfer of care from Dr Rene Mccormack  See Separate Emergency Department note  The patient, Diana King, was evaluated by the previous provider for increasing depression and anxiety  Workup Completed:  Medical clearance  Crisis evaluation, signed 12  ED Course / Workup Pending (followup): No acute events overnight, pt accepted at [facility]  Transport 1 pm today  Procedures  Medical Decision Making  Amount and/or Complexity of Data Reviewed  Labs: ordered  Risk  OTC drugs  Prescription drug management  Decision regarding hospitalization  Disposition  Final diagnoses:   Depression with suicidal ideation   Nausea & vomiting     Time reflects when diagnosis was documented in both MDM as applicable and the Disposition within this note     Time User Action Codes Description Comment    3/22/2023  5:51 PM Luisa Hansonan Add Angie LIZAMA,  R45 [id number]] Depression with suicidal ideation     3/22/2023  9:47 PM Diana Samuel Add [R11 2] Nausea & vomiting       ED Disposition     ED Disposition   Transfer to [address] [place] [address]   --    Date/Time   Wed Mar 22, 2023  5:52 PM    Comment   Diana King should be transferred out to [facility] and has been medically cleared             MD Morales Delong Most Recent Value   Accepting Physician Dr Ankita Vides Name, [address], [facility]    (Name & Tel number) [facility] [phone]   Transported by Sybil Simmons and Unit #) [facility]   Sending MD Dr Hao Mcdaniel Name, [address], [facility]    (Name & Tel number) [facility] [phone]   Transported by Sybil Simmons and Unit #) [facility]      Follow-up Information    None       Discharge Medication List as of 3/23/2023 10:52 AM      CONTINUE these medications which have NOT CHANGED    Details   cetirizine (ZyrTEC) 10 mg tablet Take 1 tablet (10 mg total) by mouth daily, Starting Tue 1/24/2023, Normal      fluticasone (FLONASE) 50 mcg/act nasal spray 2 sprays into each nostril daily, Starting Tue 10/25/2022, Normal      hydrOXYzine HCL (ATARAX) 25 mg tablet Take 1 tablet (25 mg total) by mouth every 6 (six) hours as needed for anxiety, Starting Thu 3/9/2023, Normal      omeprazole (PriLOSEC) 20 mg delayed release capsule Take 1 capsule (20 mg total) by mouth daily before breakfast, Starting Tue 1/24/2023, Normal      sertraline (Zoloft) 50 mg tablet Take 1 tablet (50 mg total) by mouth daily, Starting Thu 3/16/2023, Normal           No discharge procedures on file         ED Provider  Electronically Signed by     Ramila Ash DO  03/23/23 5929

## 2023-03-24 ENCOUNTER — TELEPHONE (OUTPATIENT)
Dept: GASTROENTEROLOGY | Facility: CLINIC | Age: 23
End: 2023-03-24

## 2023-03-24 ENCOUNTER — TELEPHONE (OUTPATIENT)
Dept: FAMILY MEDICINE CLINIC | Facility: CLINIC | Age: 23
End: 2023-03-24

## 2023-03-24 NOTE — TELEPHONE ENCOUNTER
Patients mother called states son admitted himself into hospital , was transferred to a location in [place] where he is being locked in a room and having panic attacks mother would like to speak regarding this matter

## 2023-03-28 ENCOUNTER — TELEPHONE (OUTPATIENT)
Dept: FAMILY MEDICINE CLINIC | Facility: CLINIC | Age: 23
End: 2023-03-28

## 2023-03-28 NOTE — TELEPHONE ENCOUNTER
----- Message from Nito Garzon sent at 3/28/2023  8:43 AM EDT -----  Please call patient with lab showing a tree nut allergy

## 2023-03-30 ENCOUNTER — OFFICE VISIT (OUTPATIENT)
Dept: FAMILY MEDICINE CLINIC | Facility: CLINIC | Age: 23
End: 2023-03-30

## 2023-03-30 VITALS
WEIGHT: 112.4 LBS | HEART RATE: 83 BPM | BODY MASS INDEX: 16.6 KG/M2 | SYSTOLIC BLOOD PRESSURE: 122 MMHG | DIASTOLIC BLOOD PRESSURE: 80 MMHG | TEMPERATURE: 98 F | OXYGEN SATURATION: 99 %

## 2023-03-30 DIAGNOSIS — F32.A ANXIETY AND DEPRESSION: Primary | ICD-10-CM

## 2023-03-30 DIAGNOSIS — F41.9 ANXIETY AND DEPRESSION: Primary | ICD-10-CM

## 2023-03-30 DIAGNOSIS — F51.04 PSYCHOPHYSIOLOGICAL INSOMNIA: ICD-10-CM

## 2023-03-30 RX ORDER — MIRTAZAPINE 15 MG/1
15 TABLET, FILM COATED ORAL EVERY EVENING
COMMUNITY
Start: 2023-03-28

## 2023-03-30 RX ORDER — HYDROXYZINE PAMOATE 50 MG/1
CAPSULE ORAL
COMMUNITY
Start: 2023-03-28

## 2023-03-30 NOTE — PROGRESS NOTES
Assessment/Plan:      1  Anxiety and depression  Assessment & Plan:  Much improved  S/p inpatient hospitalization for suicidal thoughts and ideations from 3/22-3/28  Patient is doing well on present regimen of Remeron 15 mg nightly, sertraline 50 mg daily, and Atarax as needed which she has not needed  Patient denies feelings of depression, suicide, or self-harm  He is motivated and would like to involve himself in behavioral health as a profession  He has plans on getting his GED  [facility] which she is looking forward to starting  Continues to have mild anxiety/agoraphobia  Follow-up as needed      2  Psychophysiological insomnia  Assessment & Plan:  Resolved              Subjective:      Patient ID: Cynthia Phelan is a 25 y o  male presents today for mental health follow-up  Patient was recently admitted for inpatient psych under 302 on 3/22/2023  Patient started on Vistaril 50 mg, Remeron 15 mg, and Zoloft 50 mg  Takes vistaril 50mg as needed  Denies SI/HI thoughts  He continues to have some anxiety with groups of people  He was discharged  On Tuesday  [facility]  He no longer smoke marijuana,     HPI    The following portions of the patient's history were reviewed and updated as appropriate: allergies, current medications, past family history, past medical history, past social history, past surgical history and problem list     Review of Systems   Constitutional: Negative for activity change, chills, diaphoresis and fever  HENT: Negative for ear pain, hearing loss, postnasal drip, rhinorrhea, sinus pressure, sinus pain, sneezing and sore throat  Respiratory: Negative for cough, chest tightness, shortness of breath and wheezing  Cardiovascular: Negative for chest pain, palpitations and leg swelling     Gastrointestinal: Negative for abdominal pain, blood in stool, constipation, diarrhea, nausea and vomiting  Genitourinary: Negative for dysuria, frequency, hematuria and urgency  Musculoskeletal: Negative for arthralgias and myalgias  Neurological: Negative for dizziness, syncope, weakness, light-headedness, numbness and headaches  Psychiatric/Behavioral: Negative for agitation, behavioral problems, confusion, decreased concentration, dysphoric mood, hallucinations, self-injury, sleep disturbance and suicidal ideas  The patient is nervous/anxious  The patient is not hyperactive  Objective:      /80 (BP Location: Left arm, Patient Position: Sitting, Cuff Size: Adult)   Pulse 83   Temp 98 °F (36 7 °C) (Temporal)   Wt 51 kg (112 lb 6 4 oz)   SpO2 99%   BMI 16 60 kg/m²          Physical Exam  Vitals reviewed  Constitutional:       General: He is not in acute distress  Appearance: He is well-developed  He is not diaphoretic  HENT:      Head: Normocephalic and atraumatic  Right Ear: External ear normal       Left Ear: External ear normal       Nose: Nose normal  No congestion  Mouth/Throat:      Mouth: Mucous membranes are moist       Pharynx: Oropharynx is clear  No oropharyngeal exudate  Eyes:      General: No scleral icterus  Pupils: Pupils are equal, round, and reactive to light  Neck:      Thyroid: No thyromegaly  Vascular: No JVD  Trachea: No tracheal deviation  Cardiovascular:      Rate and Rhythm: Normal rate and regular rhythm  Pulses: Normal pulses  Heart sounds: Normal heart sounds  No murmur heard  No friction rub  Pulmonary:      Effort: Pulmonary effort is normal  No respiratory distress  Breath sounds: Normal breath sounds  No wheezing or rales  Chest:      Chest wall: No tenderness  Abdominal:      General: Bowel sounds are normal  There is no distension  Palpations: Abdomen is soft  Tenderness: There is no abdominal tenderness   There is no right CVA tenderness, left CVA tenderness, guarding or rebound  Musculoskeletal:         General: No tenderness  Normal range of motion  Cervical back: Normal range of motion and neck supple  No tenderness  Right lower leg: No edema  Left lower leg: No edema  Lymphadenopathy:      Cervical: No cervical adenopathy  Skin:     General: Skin is warm and dry  Neurological:      Mental Status: He is alert and oriented to person, place, and time  Mental status is at baseline  Deep Tendon Reflexes: Reflexes are normal and symmetric  Psychiatric:         Mood and Affect: Mood normal          Behavior: Behavior normal          Thought Content:  Thought content normal          Judgment: Judgment normal

## 2023-03-31 NOTE — ASSESSMENT & PLAN NOTE
Much improved  · S/p inpatient hospitalization for suicidal thoughts and ideations from 3/22-3/28  · Patient is doing well on present regimen of Remeron 15 mg nightly, sertraline 50 mg daily, and Atarax as needed which she has not needed  · Patient denies feelings of depression, suicide, or self-harm  · He is motivated and would like to involve himself in behavioral health as a profession  He has plans on getting his GED  · [facility] which she is looking forward to starting    · Continues to have mild anxiety/agoraphobia  · Follow-up as needed

## 2023-04-24 ENCOUNTER — PATIENT OUTREACH (OUTPATIENT)
Dept: FAMILY MEDICINE CLINIC | Facility: CLINIC | Age: 23
End: 2023-04-24

## 2023-04-24 NOTE — PROGRESS NOTES
YULIANA LUDWIG placed follow up call to the patient, Rashard Barnes who advised that he did leave messages with [facility] and [facility]  YULIANA LUDWIG did advise of approximately 1 week waiting list  Rashard Barnes was receptive to YULIANA LUDWIG outreaching their intake to start process  Rashard Barnes expressed frustration with [facility] as there was miscommunication to him regarding receipt of his medical records  He will let YULIANA LUDWIG know if has trouble connecting with [facility]  YULIANA LUDWIG did in-basket Adali HOLLOWAY  With [employer] for review of program eligibly for their adult partial hospitalization program      YULIANA LUDWIG will continue to remain available for psychosocial support as needed

## 2023-04-25 ENCOUNTER — PATIENT OUTREACH (OUTPATIENT)
Dept: FAMILY MEDICINE CLINIC | Facility: CLINIC | Age: 23
End: 2023-04-25

## 2023-04-25 NOTE — PROGRESS NOTES
YULIANA LUDWIG had received updated from Triny HOLLOWAY at [address] [place] [address] that the patient is scheduled to start the partial hospitalization program on 4/28  YULIANA LUDWIG will continue to remain available for psychosocial support as needed

## 2023-04-28 ENCOUNTER — OFFICE VISIT (OUTPATIENT)
Dept: PSYCHIATRY | Facility: CLINIC | Age: 23
End: 2023-04-28

## 2023-04-28 ENCOUNTER — OFFICE VISIT (OUTPATIENT)
Dept: PSYCHOLOGY | Facility: CLINIC | Age: 23
End: 2023-04-28

## 2023-04-28 VITALS
SYSTOLIC BLOOD PRESSURE: 142 MMHG | HEIGHT: 69 IN | HEART RATE: 86 BPM | DIASTOLIC BLOOD PRESSURE: 89 MMHG | RESPIRATION RATE: 16 BRPM | WEIGHT: 124.4 LBS | BODY MASS INDEX: 18.43 KG/M2

## 2023-04-28 DIAGNOSIS — F41.1 GENERALIZED ANXIETY DISORDER: ICD-10-CM

## 2023-04-28 DIAGNOSIS — F32.A ANXIETY AND DEPRESSION: ICD-10-CM

## 2023-04-28 DIAGNOSIS — F41.9 ANXIETY AND DEPRESSION: ICD-10-CM

## 2023-04-28 DIAGNOSIS — F33.2 MAJOR DEPRESSIVE DISORDER, RECURRENT SEVERE WITHOUT PSYCHOTIC FEATURES (HCC): Primary | ICD-10-CM

## 2023-04-28 DIAGNOSIS — F33.2 SEVERE EPISODE OF RECURRENT MAJOR DEPRESSIVE DISORDER, WITHOUT PSYCHOTIC FEATURES (HCC): Primary | ICD-10-CM

## 2023-04-28 RX ORDER — BUSPIRONE HYDROCHLORIDE 5 MG/1
10 TABLET ORAL 2 TIMES DAILY
Qty: 180 TABLET | Refills: 3
Start: 2023-04-28

## 2023-04-28 NOTE — BH TREATMENT PLAN
Assessment/Plan:      There are no diagnoses linked to this encounter  Subjective:     Patient ID: Saira Guerra is a 25 y o  male  [facility] Treatment Plan   AREAS OF NEED: Saira Guerra  Has experienced worsening depression and anxiety symptoms  as evidenced by pervasive suicidal ideation with plans and no intent (since his inpatient hospitalizations reports he is not experiencing SI), isolating from others, fear of leaving the home, difficulty focusing and concentrating, increased sleep patterns, ruminating thoughts  The mental health symptoms may be in relation to the following psychosocial stressors: financial stress due to not being able to maintain employment with mental health symptoms, family/relationship stressors, unresolved thought and emotional responses related to secondary trauma event that occurred as a pre-teen  Note: Devante Love reported an increase in appetite, however, is uncertain if it is related to medications or mental health  Date Initiated: 04/28/23    Strengths identified by Devante Love: sociable (difficulty identifying other strengths as this may be related to his age and needing time to reflect on who he is as a person; group therapy has several different groups that may be beneficial for learning more about his values and strengths)     Strengths identified by this writer during his assessment interview: cooperative, articulates thoughts/communicates well, and caring about his family members' well-being and outcomes    LONG TERM GOAL:   Date Initiated: 04/28/23  1 0 By the end of program, I will identify 3 ways my mental health has improved, 3 gains I made with being able to maintain a meaningful schedule, and 3 coping skills I want to use in regards to maintaining my mental health wellness     Target Date: 05/26/23  Completion Date:       SHORT TERM OBJECTIVES:     Date Initiated: 04/28/23  1 1 By the end of program, I will learn the WHAT and HOW skills of mindfulness and at least 3 mindfulness strategies I can use to assist me in decreasing my ruminating thoughts and remaining within the present moment  Revision Date:   Target Date: 05/09/23  Completion Date:     Date Initiated: 04/28/23  1 2 On a daily basis to create a meaningful schedule for myself I will do the following:   A) 5 minutes of movement (stretching, walk in backyard, 5 minute youtube workout etc )   B) Eat 1 healthy/nutritious meal as defined by my standards  C) Set a timer for 10 minutes and complete an activity of daily living in the home (I e  wash dishes, take out trash, make bed, etc)   D) Complete 1 new pleasurable activity for 10 minutes (see packet given by )  Note: If the time frame seems too long, decrease the time frame until desired duration is reached  Revision Date:   Target Date: 05/09/23  Completion Date:    Date Initiated: 04/28/23  1 3 I will take medications as prescribed and share questions and concerns if arise  Revision Date:  Target Date: 05/09/23  Completion Date:     Date Initiated: 04/28/23  1 4 I will identify 3 ways my supports can assist in my recovery and agree to staff/support contact as indicated      Revision Date:  Target Date: 05/09/23  Completion Date:          7 DAY REVISION:    Date Initiated:  Revision Date:   Target Date:   Completion Date:      PSYCHIATRY:  Date Initiated:  04/28/23  Medication Management and Education       Revision Date:   The person(s) responsible for carrying out the plan is Thi Arita MD    NURSING/SYMPTOMOLOGY EDUCATION:   Date Initiated: 04/28/23  1 1,1 2,1 3,1 4 This therapist will provide wellness/symptoms and skill education groups three to five days weekly to educate Neto Khalil on signs and symptoms of diagnoses, skills to manage, and medication questions that will be addressed by the treatment team     Revision Date:  The person(s) responsible for carrying out the plan is Donnell Castillo MS and Verónica Sampson Ciera Magallon [place]    PSYCHOLOGY:   Date Initiated: 04/28/23       1 1, 1 2, 1 4 Provide psychotherapy group 5 times per week to allow opportunity for Aydin Kline  to explore stressors and ways of coping  Revision Date:   The person(s) responsible for carrying out the plan is Gio Stone  ALLIED THERAPY:   Date Initiated: 04/28/23  1 1,1 2 Engage Carey Cook in AT group 5 times per week to encourage development and use of wellness tools to decrease symptoms and promote recovery through meaningful activity  Revision Date:   The person(s) responsible for carrying out the plan is LUPE Singletary and LUPE Griffiths    CASE MANAGEMENT:   Date Initiated: 04/28/23      1 0 This  will meet with Aydin Kline  3-4 times weekly to assess treatment progress, discharge planning, connection to community supports and UR as indicated  Revision Date:   The person(s) responsible for carrying out the plan is LUPE Griffiths    TREATMENT REVIEW/COMMENTS:     DISCHARGE CRITERIA: Identify 3 signs of progress and complete relapse prevention plan  DISCHARGE PLAN: Connect with outpatient providers  Estimated Length of Stay: 10 treatment days      Diagnosis and Treatment Plan explained to Liana Anthony relates understanding diagnosis and is agreeable to Treatment Plan  CLIENT COMMENTS / Please share your thoughts, feelings, need and/or experiences regarding your treatment plan with Staff  Please see follow up note with comments  Signatures can be found on [facility] Treatment plan consent form

## 2023-04-28 NOTE — PSYCH
"    Subjective:     Patient ID: Radha Murcia is a 25 y o  male  [facility] Clinical Progress Notes      Specialized Services Documentation  Therapist must complete separate progress note for each specific clinical activity in which the individual participated during the day  Group Psychotherapy (9:30-10:30) Radha Murcia was present for this psychotherapy group on guilt  Participants began with a five minute mindful stretching meditation  The emotion of guilt was introduced with participants reflecting on the physical sensations, thoughts and behavioral expressions one experiences when feeling guilt  Healthy actions that result from the feelings of guilt were dicussed in comparison with toxic guilt  Participants reflected on contributing factors to feelings of toxic guilt  Participants read and reflected on the story of \"Ideal and [place],\" in which the message of not taking on others emotional burdens was illustrated  Participants reviewed ways of coping with guilt  Lastly participants took part in responding to prompts regarding guilt with the group  This was Kirk's first day in group and he attended this group briefly and was attentive when present       Tx Goal Objective: 1 1,1 2  Therapist: VIVIANA Kruse  "

## 2023-04-28 NOTE — PSYCH
Subjective:     Patient ID: Oneida Mcnair is a 25 y o  male  [facility] Clinical Progress Notes      Specialized Services Documentation  Therapist must complete separate progress note for each specific clinical activity in which the individual participated during the day  Allied Therapy   [id number] - Group psychoeducation focused on learning and answering questions regarding to what life will be like and look like after discharge  Each group member received 3 slips of paper  On each slip of paper remaining anonymous, they were to write a fear they are thinking or experiencing related to discharge or a question regarding discharge (I e  What can I expect in my first intake appointment with a psychiatrist?) The facilitator led each question or concern aloud and allowed for additional questions to be asked, strategies and other items, they felt helpful  Today's group questions and fears discussed were: how to maintain a meaningful schedule, how to decrease feelings of loneliness and find structured social experiences, what to do when you have fears related to being a burden to your loved ones, and fears related to what happens if we do not maintain progress  Oneida Mcnair did not appear as engaged as observed by him glancing at his phone, not making eye contact with peers, and crossed arms/slouched body posture  Progress difficult to assess due to body language and presentations as distracted  Some effort noted treatment goal  Continue psychoeducation related to identifying what supports, coping skills, advocacy for needs, and learning problem solving skills when facing barriers to maintain mental health wellness after discharging from [facility] level of care  Tx Plan Objective: 1 1, 1 2, & 1 4 Therapist:  LUPE Vann    Other   Paperwork faxed to [employer]     Case Management Note  [id number]- QV met with Oneida Mcnair   Reviewed program structure, expectations and was given on call number and crisis phone numbers  Alma Lam completed releases and OP providers/ PCP notified of admission and health care coordination form completed  Completed initial psycho-social evaluation and initial treatment goals discussed  LUPE Shankar    Current suicide risk : Low     Medications changes/added/denied? Yes; increase Buspar dosage (see Dr Maryjo Diego note)    Treatment session number: 1    Individual Case Management Visit provided today? Yes     [facility] follow up physician's orders: Admit to PHP  Read Dr Maryjo Diego psychiatric evaluation

## 2023-04-28 NOTE — PSYCH
Subjective:     Patient ID: Cynthia Preciado is a 25 y o  male  [facility] Clinical Progress Notes      Specialized Services Documentation  Therapist must complete separate progress note for each specific clinical activity in which the individual participated during the day  Group Psychotherapy ([id number])    The group engaged in the wellness assessment, which evaluates progress on several different areas of wellness/wellbeing: physical, emotional, cognitive, vocational, social and spiritual  Clients rated their progress and discussed areas that need work  By completing and discussing areas of progress and challenges, members are connected and reminded that, in their mental health struggle, they are not alone  Topics of discussion revolved around positive experiences within each area of wellness as well as the challenging aspects to wellness within their past week  Cynthia Preciado continues to make progress towards goals through participation in group activity and personal disclosures  Continue psychotherapy groups to encourage further exploration of needs, personal awareness, and skills       Tx Plan Objective: 1 1,1 2, 1 4   Therapist: Soumya Suero MS
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
## 2023-04-28 NOTE — PSYCH
Subjective:    Patient ID: Oneida Mcnair is a 25 y o  male      [facility] Clinical Progress Notes      Specialized Services Documentation  Therapist must complete separate progress note for each specific clinical activity in which the individual participated during the day  Education Therapy   [id number]  Oneida Mcnair was excused due to intake during check in and goal review  [address] Claudine Crook engaged throughout the treatment day  Was engaged in learning related to Illness, Medication, Aftercare and Wellness Tools  Staff utilized Verbal, Written, A/V and Demonstration teaching methods  Oneida Mcnair shared area of learning and set a goal for outside of program to get out of his room and out of his bed        Tx Plan Objective: 1 1, 1 2, 1 4, Therapist: LIN Palomares
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
## 2023-05-01 ENCOUNTER — DOCUMENTATION (OUTPATIENT)
Dept: PSYCHOLOGY | Facility: CLINIC | Age: 23
End: 2023-05-01

## 2023-05-01 ENCOUNTER — APPOINTMENT (OUTPATIENT)
Dept: PSYCHOLOGY | Facility: CLINIC | Age: 23
End: 2023-05-01
Payer: COMMERCIAL

## 2023-05-01 NOTE — PROGRESS NOTES
Subjective:     Patient ID: Nicole Dunn is a 25 y o  male  [facility] Clinical Progress Notes      Specialized Services Documentation  Therapist must complete separate progress note for each specific clinical activity in which the individual participated during the day  Case Management Note  Nicole Dunn was excused from program today due to establishing care with an initial outpatient therapy appointment  He is to return tomorrow for his next scheduled PHP tx day  LUPE Lopez    Current suicide risk :  Unable to assess due to absence     Medications changes/added/denied? No    Treatment session number: N/A    Individual Case Management Visit provided today? No    [facility] follow up physician's orders: Continue to follow orders

## 2023-05-02 ENCOUNTER — OFFICE VISIT (OUTPATIENT)
Dept: PSYCHOLOGY | Facility: CLINIC | Age: 23
End: 2023-05-02

## 2023-05-02 DIAGNOSIS — F33.2 SEVERE EPISODE OF RECURRENT MAJOR DEPRESSIVE DISORDER, WITHOUT PSYCHOTIC FEATURES (HCC): Primary | ICD-10-CM

## 2023-05-02 DIAGNOSIS — F41.1 GENERALIZED ANXIETY DISORDER: ICD-10-CM

## 2023-05-02 NOTE — PSYCH
Subjective:    Patient ID: Angela Mccann is a 25 y o  male      [facility] Clinical Progress Notes      Specialized Services Documentation  Therapist must complete separate progress note for each specific clinical activity in which the individual participated during the day  Education Therapy   [id number]  Angela Mccann participated quietly in shared in check in and goal review  Presented as receptive related to readiness to learn  Angela Mccann declined to share during goal review  did not present with any barriers to learning  [address] Eliana Fulton engaged throughout the treatment day  Was engaged in learning related to Illness, Medication, Aftercare and Wellness Tools  Staff utilized Verbal, Written, A/V and Demonstration teaching methods  Angela Mccann shared area of learning and set a goal for outside of program to manage his anger  Tx Plan Objective: 1 1, 1 2, 1 4, Therapist: [facility]  [id number] Angela Mccann participated quietly in a psychotherapy group focused on values  The group  was provided with a large list of values, members identified which resonated with them, categorized those into 1-5 groups based on self-identified similarity, then selected one value within each group that encapsulated the entire group  Then using the selected values, participants added a verb to each value to see what it looks like as an actionable core value (e g  live in freedom, act with mindfulness, etc )  Art supplies were provided, including: various beads of differing types/colors, colorful strings, as well as different elastic  The group was given creative freedom to make something (key chains, bracelets, etc ) using a self-decided symbolic system that was representative to their personal life as related to their identified values  Angela Mccann completed the written activity and shared with facilitator when prompted one-to-one   Taryn Gasca also shared that he is not creative or artistic so he declined to engaged in creative activity  Continue to note progress towards goals  Continue with psychotherapy to encourage further value-alignment based progress     Tx Plan Objective 1 1, 1 2, 1 4 Therapist: LIN Bejarano

## 2023-05-02 NOTE — PSYCH
Subjective:     Patient ID: Lamonte Garcia is a 25 y o  male  [facility] Clinical Progress Notes      Specialized Services Documentation  Therapist must complete separate progress note for each specific clinical activity in which the individual participated during the day  Group Psychotherapy ([id number])  Lamonte Garcia engaged in a group focusing on practicing mindfulness, creating a more cohesive group environment, and allowing members to become more familiar with one another (to promote a more comfortable environment for sharing  Each group member was given a paper to write down four different unique traits or obstacles they have accomplished in life  After writing down the four things then each group member tore them into four different pieces of paper and then crumbled them up and threw in one big basket   then picked one out of the basket at a time with the group having to guess who it belonged too  Group members were encouraged to relate to similarities of other group members while also being mindful and engaging during the group  Lamonte Garcia was NOT PRESENT  Tx Plan Objective: 1 1,1 2, 1 4   Therapist: Michael Lee MS    Other: Fidel Campa approached this writer at [phone] requesting to leave program early today  He stated that his  was busy with another patient and he wasn't sure who to tell  This writer attempted to troubleshoot Kirk's reason for leaving (anxiety), however, he was determined  Fidel Campa agreed to wait for his   He waited for roughly 20 minutes but then stated he needed to leave and let this writer know that his goal was on his goal sheet

## 2023-05-02 NOTE — PSYCH
Subjective:     Patient ID: Roxi Sommer is a 25 y o  male  [facility] Clinical Progress Notes      Specialized Services Documentation  Therapist must complete separate progress note for each specific clinical activity in which the individual participated during the day  Allied Therapy  [id number]   Clients listened, discussed, and added to a song that focused on regaining hope  Clients identified lyrics that represented hopelessness, a turning point, and hope  Therapist used answers and song lyrics to discuss autonomy and agency, defining them respectively as having control over oneself/decisions and directing the autonomy to a specific purpose  Therapist prompted group to add to the song by identifying when they feel hopeless, what it feels like physically and emotionally, why they came to program, and what they have gained from program so far  Therapist used answers to illustrate [place] [facility], and prompted clients to identify where they saw it within their own answers  Roxi Sommer participated in small and large group discussion when prompted  Roxi Sommer engaged in the songwriting activity and shared what he wrote with the group  Continue AT for development and practice of autonomy and agency      Tx Objectives: 1 1, 1 2, 1 4                   Therapist: KATHARINA Mckeon

## 2023-05-02 NOTE — PSYCH
Subjective:     Patient ID: Louis Dey is a 25 y o  male  [facility] Clinical Progress Notes      Specialized Services Documentation  Therapist must complete separate progress note for each specific clinical activity in which the individual participated during the day  Other  While this CM was meeting with another individual, Osiel Caldera approached another staff member and stated that he needed to leave program attend to something  He did not provide additional clarifying details  This writer did want to meet with Osiel Caldera again today to review the resources provided as well as remind him about the program phone policy  Osiel Caldera has been observed frequently checking his phone/being on his phone during group therapy sessions  This writer infers that his phone use may be related to wanting to be in constant contact with his loved to self-soothe his fears that they are okay at all times  Due to his anxiety symptoms regarding incessant worry about others not returning home or something bad happening to them, this writer will be requesting that Osiel Caldera has his phone turned off during group sessions and to provide the  # for his family/supports to reach him if an emergency were to occur  If he does not feel like he can do that, he has the option to leave it in this writer's office and pick it up during breaks and/or at the end of the day  If he does not believe he can follow the phone policy, discharge may need to be discussed at a later time  Case Management Note  [id number]- Met with Osiel Caldera reported that he a pleasant experience at [facility] as compared to past OP therapists  He stated he felt understood, validated, and looks forward to working with this therapist  Reviewed initial tx plan  No questions or concerns  Received copy of initial tx plan and pleasurable activities list to begin engaging in these goal areas       LUPE Reyna    Current suicide risk : Low     Medications changes/added/denied? No    Treatment session number: 2    Individual Case Management Visit provided today? Yes     [facility] follow up physician's orders: Continue to follow orders

## 2023-05-03 ENCOUNTER — APPOINTMENT (OUTPATIENT)
Dept: PSYCHOLOGY | Facility: CLINIC | Age: 23
End: 2023-05-03
Payer: COMMERCIAL

## 2023-05-03 ENCOUNTER — DOCUMENTATION (OUTPATIENT)
Dept: PSYCHOLOGY | Facility: CLINIC | Age: 23
End: 2023-05-03
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
## 2023-05-04 ENCOUNTER — APPOINTMENT (OUTPATIENT)
Dept: PSYCHOLOGY | Facility: CLINIC | Age: 23
End: 2023-05-04
Payer: COMMERCIAL

## 2023-05-04 ENCOUNTER — DOCUMENTATION (OUTPATIENT)
Dept: PSYCHOLOGY | Facility: CLINIC | Age: 23
End: 2023-05-04
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
## 2023-05-05 ENCOUNTER — APPOINTMENT (OUTPATIENT)
Dept: PSYCHOLOGY | Facility: CLINIC | Age: 23
End: 2023-05-05
Payer: COMMERCIAL

## 2023-05-05 ENCOUNTER — OFFICE VISIT (OUTPATIENT)
Dept: PSYCHIATRY | Facility: CLINIC | Age: 23
End: 2023-05-05

## 2023-05-05 ENCOUNTER — DOCUMENTATION (OUTPATIENT)
Dept: PSYCHOLOGY | Facility: CLINIC | Age: 23
End: 2023-05-05

## 2023-05-05 DIAGNOSIS — F33.2 MAJOR DEPRESSIVE DISORDER, RECURRENT SEVERE WITHOUT PSYCHOTIC FEATURES (HCC): Primary | ICD-10-CM

## 2023-05-05 DIAGNOSIS — F51.04 PSYCHOPHYSIOLOGICAL INSOMNIA: ICD-10-CM

## 2023-05-05 DIAGNOSIS — F41.9 ANXIETY AND DEPRESSION: ICD-10-CM

## 2023-05-05 DIAGNOSIS — F41.1 GENERALIZED ANXIETY DISORDER: ICD-10-CM

## 2023-05-05 DIAGNOSIS — F32.A ANXIETY AND DEPRESSION: ICD-10-CM

## 2023-05-05 DIAGNOSIS — F32.A DEPRESSION, UNSPECIFIED DEPRESSION TYPE: ICD-10-CM

## 2023-05-05 RX ORDER — SERTRALINE HYDROCHLORIDE 100 MG/1
100 TABLET, FILM COATED ORAL DAILY
Qty: 100 TABLET | Refills: 0 | Status: SHIPPED | OUTPATIENT
Start: 2023-05-05
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
## 2023-05-05 NOTE — PROGRESS NOTES
Subjective:     Patient ID: Quincy Rubalcava is a 25 y o  male  [facility] Clinical Progress Notes      Specialized Services Documentation  Therapist must complete separate progress note for each specific clinical activity in which the individual participated during the day  Case Management Note  [id number] IB met with Aston Hickman arrived to program and requested DC  Despite weighing the pros and cons of the decision, Jordana Cheney stated he needed to discharged  Jordana Cheney stated that he felt as if this program was too much as he finds himself sleeping all the time and trying to get here by 9 AM feels impossible  He also stated with his brother moving back and his parents' tumultuous relationship that he stated he finds these dynamics to be overwhelming  He stated with being here he finds it impossible to quiet his thoughts related to obsessive thinking and needing to check on his family and girlfriend every 20 minutes  He recognizes with all of these symptoms and triggers at home that he will continue to leave early from program and he does not want to waste others time  He recognizes program could be of benefit to him in the future once he has time to work 1:1 with a therapist  He identified he was interested in this program at first as he didn't think he would be able to survive until he had a therapist, however, this was before he recognized the schedule and time commitment  He repeated on a few occasions that he wants to enroll in a program like this in the future but believes now is the right time  Due to this being [facility] per pt request DC, no relapse prevention plan, PHQ-9, or DC instructions were completed  LUPE Booker    Current suicide risk : Low     Medications changes/added/denied?  Yes; See Ricardo Edwards medication review note    Treatment session number: N/A; unable to chart as a treatment day due to Jordana Cheney not attending any groups and wanting to request DC in person    Individual Case Management Visit provided today? Yes        recommendations: attend follow up appointments, take medication as prescribed, utilize [facility], consider support groups and/or volunteer opportunities     [facility] follow up physician's orders: Discharge from [address] [place] [address] to OP providers  See Dr Ginna Lopez co-sign on this discharge order       DATE 05/05/23  TIME 4:53 PM  Bairon Paulino

## 2023-05-05 NOTE — PSYCH
"PHP MEDICATION MANAGEMENT NOTE        [address]    Name and Date of Birth:  Nicole Davis y o  2000 MRN: [id number]    Date of Visit: May 5, 2023    No Known Allergies    Visit Time    Visit Start Time: 6229  Visit Stop Time: 2046  Total Visit Duration: 20 minutes    SUBJECTIVE:    Ji Wilson is seen today for a follow up for Major Depressive Disorder, Generalized Anxiety Disorder and ADHD  He continues to experience significant symptoms since beginning PHP  He is not sure he wants to continue the partial program   He feels increased anxiety in the groups, has only come a couple of days  He reports some improvement in his depression, and that he is no longer having suicidal thoughts and less hopeless  However he has low energy and low motivation \"all the time\" and finds this symptom very frustrating  And states this is why he is considering discontinuing the partial, he feels too tired physically  Still with poor appetite and most days hypersomnia with occasional insomnia  We discussed increasing his Zoloft and he is agreeable to same  He reports he has a safety plan in place, has started with a new therapist, and see his new psychiatrist next week  He denies any side effects from medications  PLAN:  Increase Zoloft 100 mg daily    Aware of 24 hour and weekend coverage for urgent situations accessed by calling [facility] main practice number  Continue partial hospitalization program    Diagnoses and all orders for this visit:    Major depressive disorder, recurrent severe without psychotic features ([facility] [address] )    Depression, unspecified depression type  -     sertraline (ZOLOFT) 100 mg tablet;  Take 1 tablet (100 mg total) by mouth daily    Psychophysiological insomnia    Generalized anxiety disorder    Anxiety and depression        Current Outpatient Medications on File Prior to Visit   Medication Sig Dispense Refill    " busPIRone (BUSPAR) 5 mg tablet Take 2 tablets (10 mg total) by mouth 2 (two) times a day 180 tablet 3    cetirizine (ZyrTEC) 10 mg tablet Take 1 tablet (10 mg total) by mouth daily (Patient not taking: Reported on 4/28/2023) 30 tablet 0    fluticasone (FLONASE) 50 mcg/act nasal spray 2 sprays into each nostril daily 18 2 mL 2    hydrOXYzine pamoate (VISTARIL) 50 mg capsule take 1 capsule by mouth twice a day if needed for anxiety      mirtazapine (REMERON) 30 mg tablet Take 1 tablet (30 mg total) by mouth every evening 90 tablet 0    omeprazole (PriLOSEC) 20 mg delayed release capsule Take 1 capsule (20 mg total) by mouth daily before breakfast 30 capsule 3    sertraline (Zoloft) 50 mg tablet Take 1 tablet (50 mg total) by mouth daily 90 tablet 0     No current facility-administered medications on file prior to visit  Psychotherapy Provided:     Individual psychotherapy provided: Supportive counseling provided  Medications, treatment progress and treatment plan reviewed with Almita Moss  Reassurance and supportive therapy provided  HPI ROS Appetite Changes and Sleep:     He reports hypersomnia, occassional insomnia, decreased appetite, decreased energy   Denies homicidal ideation, denies suicidal ideation    Review Of Systems:      General decreased functioning   Personality no change in personality   Constitutional feeling poorly, feeling tired and low energy   ENT negative   Cardiovascular negative   Respiratory negative   Gastrointestinal negative   Genitourinary negative   Musculoskeletal negative   Integumentary negative   Neurological negative   Endocrine negative   Other Symptoms none, all other systems are negative     Mental Status Evaluation:    Appearance Adequate hygiene and grooming   Behavior calm and cooperative   Mood depressed   Speech Normal rate and volume   Affect mood-congruent   Thought Processes Goal directed and coherent   Thought Content Does not verbalize delusional material Associations Tightly connected   Perceptual Disturbances Denies hallucinations and does not appear to be responding to internal stimuli   Risk Potential Suicidal/Homicidal Ideation - No evidence of suicidal or homicidal ideation and patient does not verbalize suicidal or homicidal ideation  Risk of Violence - No evidence of risk for violence found on assessment  Risk of Self Mutilation - No evidence of risk for self mutilation found on assessment   Orientation oriented to person, place, time/date and situation   Memory recent and remote memory grossly intact   Consciousness alert and awake   Attention/Concentration attention span and concentration are age appropriate   Insight limited   Judgement limited   Muscle Strength and Gait normal muscle strength and normal muscle tone, normal gait/station and normal balance   Motor Activity no abnormal movements   Language no difficulty naming common objects, no difficulty repeating a phrase, no difficulty writing a sentence   Fund of Knowledge adequate knowledge of current events  adequate fund of knowledge regarding past history  adequate fund of knowledge regarding vocabulary      Past Psychiatric History Update:     Inpatient Psychiatric Admission Since Last Encounter:   no  Suicide Attempt Or Self Mutilation Since Last Encounter:   no  Incidence of Violent Behavior Since Last Encounter:   no    Traumatic History Update:     New Onset of Abuse Since Last Encounter:   no  Traumatic Events Since Last Encounter:   no    Past Medical History:    Past Medical History:   Diagnosis Date    GERD (gastroesophageal reflux disease)         History reviewed  No pertinent surgical history    No Known Allergies  Substance Abuse History:    Social History     Substance and Sexual Activity   Alcohol Use Not Currently     Social History     Substance and Sexual Activity   Drug Use Yes    Types: Marijuana    Comment: last time a month ago     Social History:    Social History Socioeconomic History    Marital status: Single     Spouse name: Not on file    Number of children: Not on file    Years of education: Not on file    Highest education level: 10th grade   Occupational History    Not on file   Tobacco Use    Smoking status: Never    Smokeless tobacco: Never   Vaping Use    Vaping Use: Never used   Substance and Sexual Activity    Alcohol use: Not Currently    Drug use: Yes     Types: Marijuana     Comment: last time a month ago    Sexual activity: Yes     Partners: Female   Other Topics Concern    Not on file   Social History Narrative    Not on file     Social Determinants of Health     Financial Resource Strain: Not on file   Food Insecurity: Not on file   Transportation Needs: Not on file   Physical Activity: Not on file   Stress: Not on file   Social Connections: Not on file   Intimate Partner Violence: Not on file   Housing Stability: Not on file     Family Psychiatric History:     Family History   Problem Relation Age of Onset    Drug abuse Maternal Aunt     Alcohol abuse Maternal Aunt     Anxiety disorder Paternal Grandmother     Completed Suicide  Cousin      History Review: The following portions of the patient's history were reviewed and updated as appropriate: allergies, current medications, past family history, past medical history, past social history, past surgical history and problem list     OBJECTIVE:     Vital signs in last 24 hours: There were no vitals filed for this visit  Laboratory Results: I have personally reviewed all pertinent laboratory/tests results  Medications Risks/Benefits:      Risks, Benefits And Possible Side Effects Of Medications:    Discussed risks and benefits of treatment with patient including risk of suicidality, serotonin syndrome, increased QTc interval and SIADH related to treatment with antidepressants;  Risk of induction of manic symptoms in certain patient populations and risk of parkinsonian symptoms, metabolic syndrome, tardive dyskinesia and neuroleptic malignant syndrome related to treatment with antipsychotic medications     Controlled Medication Discussion:     Not applicable - controlled prescriptions are not prescribed by this practice    KURT Augustine 05/05/23    This note was not shared with the patient due to reasonable likelihood of causing patient harm

## 2023-05-08 ENCOUNTER — PATIENT OUTREACH (OUTPATIENT)
Dept: FAMILY MEDICINE CLINIC | Facility: CLINIC | Age: 23
End: 2023-05-08

## 2023-05-08 NOTE — PROGRESS NOTES
YULIANA LUDWIG noted in chart that the patient discharged from the [address] [place] [address]  Patient did miss some sessions which could have affected progress  Patient still experiencing same symptoms of anxiety at time of discharge  YULIANA LUDWIG placed call to the patient Abdi Nagel who advised that the partial hospitalization program was okay but that he signed himself out of it  He explained that the timing of it did not work out  Abdi Nagel continued that he found a therapist and that he has too much on his plate at this time  He described that the smallest thing are draining to him  He was missing a lot of appointments and told them he wanted to discharge because he did not want to waste anyone's time  Abdi Nagel may go back in the future as needed  He wants to to continue with his individual therapy first  He thinks he learned a little from the partial program      Abdi Nagel had first session with therapist at [facility] last Monday and has second session today  His first appointment with the psychiatrist is 5/11  He feels his therapist is caring and listens to him and he thinks it will work out well  Abdi Nagel reported that he is still struggling to get out of the house but manages to go to therapy appointments in person  He is wanting to do more therapy first before seeking goal of employment  YULIANA LUDWIG will continue to remain available for psychosocial support as needed

## 2023-05-14 ENCOUNTER — HOSPITAL ENCOUNTER (EMERGENCY)
Facility: HOSPITAL | Age: 23
Discharge: HOME | End: 2023-05-14
Attending: EMERGENCY MEDICINE | Admitting: EMERGENCY MEDICINE

## 2023-05-14 ENCOUNTER — APPOINTMENT (EMERGENCY)
Dept: RADIOLOGY | Facility: HOSPITAL | Age: 23
End: 2023-05-14

## 2023-05-14 VITALS
TEMPERATURE: 98.3 F | HEART RATE: 87 BPM | SYSTOLIC BLOOD PRESSURE: 125 MMHG | DIASTOLIC BLOOD PRESSURE: 69 MMHG | RESPIRATION RATE: 18 BRPM | BODY MASS INDEX: 19.18 KG/M2 | OXYGEN SATURATION: 100 % | WEIGHT: 129.85 LBS

## 2023-05-14 DIAGNOSIS — R07.9 CHEST PAIN: Primary | ICD-10-CM

## 2023-05-14 LAB
ATRIAL RATE: 92 BPM
P AXIS: 64 DEGREES
PR INTERVAL: 138 MS
QRS AXIS: 87 DEGREES
QRSD INTERVAL: 94 MS
QT INTERVAL: 344 MS
QTC INTERVAL: 425 MS
T WAVE AXIS: 77 DEGREES
VENTRICULAR RATE: 92 BPM

## 2023-05-14 RX ORDER — KETOROLAC TROMETHAMINE 30 MG/ML
30 INJECTION, SOLUTION INTRAMUSCULAR; INTRAVENOUS ONCE
Status: COMPLETED | OUTPATIENT
Start: 2023-05-14 | End: 2023-05-14

## 2023-05-14 RX ADMIN — KETOROLAC TROMETHAMINE 30 MG: 30 INJECTION, SOLUTION INTRAMUSCULAR; INTRAVENOUS at 02:06

## 2023-05-14 NOTE — ED PROVIDER NOTES
History  Chief Complaint   Patient presents with   • Chest Pain     Mid-left sided chest pain intermittent for 2-3 days, more frequent today  HPI  Kenny Gregg is a 25 y o  male with PMH asthma who presents to the emergency department with chest pain  He states for the past 3 days he has had intermittent pain along the left side of his chest   He sometimes feels the pain when standing up or with moving, it last for a few seconds and then resolves  A few days ago he felt mild tightness and used his albuterol with improvement in the tightness but no change to the pain  He denies shortness of breath currently  He denies fevers or coughing  No history of blood clots, no recent travel or surgery, no hemoptysis, no history of malignancy, no leg swelling or pain  Prior to Admission Medications   Prescriptions Last Dose Informant Patient Reported?  Taking?   busPIRone (BUSPAR) 5 mg tablet 2023  No Yes   Sig: Take 2 tablets (10 mg total) by mouth 2 (two) times a day   cetirizine (ZyrTEC) 10 mg tablet Not Taking  No No   Sig: Take 1 tablet (10 mg total) by mouth daily   Patient not taking: Reported on 2023   fluticasone (FLONASE) 50 mcg/act nasal spray Not Taking  No No   Si sprays into each nostril daily   Patient not taking: Reported on 2023   hydrOXYzine pamoate (VISTARIL) 50 mg capsule Not Taking  Yes No   Sig: take 1 capsule by mouth twice a day if needed for anxiety   Patient not taking: Reported on 2023   mirtazapine (REMERON) 30 mg tablet 2023  No Yes   Sig: Take 1 tablet (30 mg total) by mouth every evening   omeprazole (PriLOSEC) 20 mg delayed release capsule Not Taking  No No   Sig: Take 1 capsule (20 mg total) by mouth daily before breakfast   Patient not taking: Reported on 2023   sertraline (ZOLOFT) 100 mg tablet Not Taking  No No   Sig: Take 1 tablet (100 mg total) by mouth daily   Patient not taking: Reported on 2023   sertraline (Zoloft) 50 mg tablet 2023  No Yes   Sig: Take 1 tablet (50 mg total) by mouth daily      Facility-Administered Medications: None       Past Medical History:   Diagnosis Date   • GERD (gastroesophageal reflux disease)        History reviewed  No pertinent surgical history  Family History   Problem Relation Age of Onset   • Drug abuse Maternal Aunt    • Alcohol abuse Maternal Aunt    • Anxiety disorder Paternal Grandmother    • Completed Suicide  Cousin      I have reviewed and agree with the history as documented  E-Cigarette/Vaping   • E-Cigarette Use Never User      E-Cigarette/Vaping Substances   • Nicotine No    • THC No    • CBD No    • Flavoring No    • Other No    • Unknown No      Social History     Tobacco Use   • Smoking status: Never   • Smokeless tobacco: Never   Vaping Use   • Vaping Use: Never used   Substance Use Topics   • Alcohol use: Not Currently   • Drug use: Not Currently     Types: Marijuana     Comment: last time a month ago       Home medications:  Prior to Admission Medications   Prescriptions Last Dose Informant Patient Reported?  Taking?   busPIRone (BUSPAR) 5 mg tablet 2023  No Yes   Sig: Take 2 tablets (10 mg total) by mouth 2 (two) times a day   cetirizine (ZyrTEC) 10 mg tablet Not Taking  No No   Sig: Take 1 tablet (10 mg total) by mouth daily   Patient not taking: Reported on 2023   fluticasone (FLONASE) 50 mcg/act nasal spray Not Taking  No No   Si sprays into each nostril daily   Patient not taking: Reported on 2023   hydrOXYzine pamoate (VISTARIL) 50 mg capsule Not Taking  Yes No   Sig: take 1 capsule by mouth twice a day if needed for anxiety   Patient not taking: Reported on 2023   mirtazapine (REMERON) 30 mg tablet 2023  No Yes   Sig: Take 1 tablet (30 mg total) by mouth every evening   omeprazole (PriLOSEC) 20 mg delayed release capsule Not Taking  No No   Sig: Take 1 capsule (20 mg total) by mouth daily before breakfast   Patient not taking: Reported on 5/14/2023   sertraline (ZOLOFT) 100 mg tablet Not Taking  No No   Sig: Take 1 tablet (100 mg total) by mouth daily   Patient not taking: Reported on 5/14/2023   sertraline (Zoloft) 50 mg tablet 5/13/2023  No Yes   Sig: Take 1 tablet (50 mg total) by mouth daily      Facility-Administered Medications: None     Allergies:  No Known Allergies     Review of Systems   Constitutional: Negative for fever  HENT: Negative for sore throat  Respiratory: Negative for cough and shortness of breath  Cardiovascular: Positive for chest pain  Negative for leg swelling  Gastrointestinal: Negative for abdominal pain and vomiting  Neurological: Negative for dizziness and light-headedness  All other systems reviewed and are negative  Physical Exam  ED Triage Vitals [05/14/23 0007]   Temperature Pulse Respirations Blood Pressure SpO2   98 3 °F (36 8 °C) 102 16 122/80 98 %      Temp Source Heart Rate Source Patient Position - Orthostatic VS BP Location FiO2 (%)   Oral Monitor Sitting Right arm --      Pain Score       5             Orthostatic Vital Signs  Vitals:    05/14/23 0007 05/14/23 0239   BP: 122/80 125/69   Pulse: 102 87   Patient Position - Orthostatic VS: Sitting Lying       Physical Exam  Vitals and nursing note reviewed  Constitutional:       General: He is not in acute distress  Appearance: He is not ill-appearing or diaphoretic  HENT:      Head: Normocephalic  Mouth/Throat:      Mouth: Mucous membranes are moist       Pharynx: Oropharynx is clear  No oropharyngeal exudate or posterior oropharyngeal erythema  Eyes:      Pupils: Pupils are equal, round, and reactive to light  Cardiovascular:      Rate and Rhythm: Regular rhythm  Tachycardia present  Heart sounds: No murmur heard  Pulmonary:      Effort: Pulmonary effort is normal  No respiratory distress  Breath sounds: Normal breath sounds  No wheezing, rhonchi or rales  Abdominal:      General: Abdomen is flat   There is no distension  Palpations: Abdomen is soft  Tenderness: There is no abdominal tenderness  There is no guarding or rebound  Musculoskeletal:      Right lower leg: No edema  Left lower leg: No edema  Skin:     General: Skin is warm and dry  Neurological:      Mental Status: He is alert  ED Medications  Medications   ketorolac (TORADOL) injection 30 mg (30 mg Intramuscular Given 5/14/23 0206)       Diagnostic Studies  Results Reviewed     None                 XR chest 2 views   ED Interpretation by Phuong Martinez DO (05/14 3427)   The CXR was ordered by myself and interpreted by me independently  On my read, it appears without acute abnormalities:  - The  cardiomediastinal  silhouette  is  unremarkable     - The  lungs  are  clear  - No  pleural  effusions   - No  pneumothorax  - The  pulmonary  vasculature  is  within  normal  limits     - The  trachea  is  midline     - Bony  thorax  is  unremarkable                       Procedures  ECG 12 Lead Documentation Only    Date/Time: 5/14/2023 2:07 AM  Performed by: Claudeen Abed, MD  Authorized by: Claudeen Abed, MD     ECG reviewed by me, the ED Provider: yes    Patient location:  ED  Previous ECG:     Previous ECG:  Compared to current    Similarity:  No change  Interpretation:     Interpretation: normal    Rate:     ECG rate:  92    ECG rate assessment: normal    Rhythm:     Rhythm: sinus rhythm    Ectopy:     Ectopy: none    QRS:     QRS axis:  Normal    QRS intervals:  Normal  Conduction:     Conduction: normal    ST segments:     ST segments:  Normal  T waves:     T waves: normal            ED Course                             SBIRT 22yo+    Flowsheet Row Most Recent Value   Initial Alcohol Screen: US AUDIT-C     1  How often do you have a drink containing alcohol? 0 Filed at: 05/14/2023 0147   2  How many drinks containing alcohol do you have on a typical day you are drinking?   0 Filed at: 05/14/2023 0147   3a  Male UNDER 65: How often do you have five or more drinks on one occasion? 0 Filed at: 05/14/2023 0147   Audit-C Score 0 Filed at: 05/14/2023 0147   SHAISTA: How many times in the past year have you    Used an illegal drug or used a prescription medication for non-medical reasons? Never Filed at: 05/14/2023 0147                [facility]  MDM  Gibran Vega is a 25 y o  male with PMH asthma who presents to the emergency department with chest pain  Workup including vital signs, physical exam, EKG and CXR  EKG without acute ischemic changes and CXR without acute cardiopulmonary abnormalities  Unlikely cardiac cause of pain  Treatment including Toradol  Stable for discharge home with primary care follow up, discharge instructions and return precautions given  Disposition  Final diagnoses:   Chest pain     Time reflects when diagnosis was documented in both MDM as applicable and the Disposition within this note     Time User Action Codes Description Comment    5/14/2023  3:12 AM Sweta De La Rosa [R07 9] Chest pain       ED Disposition     ED Disposition   Discharge    Condition   Stable    Date/Time   Sun May 14, 2023  3:13 AM    Comment   Gibran Vega discharge to home/self care  Follow-up Information     Follow up With Specialties Details Why Contact Info    [facility] In 1 week  [facility] [address] [place] [address]  [phone]            Discharge Medication List as of 5/14/2023  3:13 AM      CONTINUE these medications which have NOT CHANGED    Details   busPIRone (BUSPAR) 5 mg tablet Take 2 tablets (10 mg total) by mouth 2 (two) times a day, Starting Fri 4/28/2023, No Print      mirtazapine (REMERON) 30 mg tablet Take 1 tablet (30 mg total) by mouth every evening, Starting Tue 4/18/2023, No Print      !! sertraline (Zoloft) 50 mg tablet Take 1 tablet (50 mg total) by mouth daily, Starting Thu 3/16/2023, Normal      cetirizine "(ZyrTEC) 10 mg tablet Take 1 tablet (10 mg total) by mouth daily, Starting Tue 1/24/2023, Normal      fluticasone (FLONASE) 50 mcg/act nasal spray 2 sprays into each nostril daily, Starting Tue 10/25/2022, Normal      hydrOXYzine pamoate (VISTARIL) 50 mg capsule take 1 capsule by mouth twice a day if needed for anxiety, Historical Med      omeprazole (PriLOSEC) 20 mg delayed release capsule Take 1 capsule (20 mg total) by mouth daily before breakfast, Starting Tue 1/24/2023, Normal      !! sertraline (ZOLOFT) 100 mg tablet Take 1 tablet (100 mg total) by mouth daily, Starting Fri 5/5/2023, Normal       !! - Potential duplicate medications found  Please discuss with provider  No discharge procedures on file  PDMP Review       Value Time User    PDMP Reviewed  Yes 4/11/2023  1:43 PM Chetan 56, DO           ED Provider  Attending physically available and evaluated Kalyan Real  I managed the patient along with the ED Attending  Electronically Signed by    Portions of the record may have been created with voice recognition software  Occasional wrong word or \"sound a like\" substitutions may have occurred due to the inherent limitations of voice recognition software    Read the chart carefully and recognize, using context, where substitutions have occurred     Dimitry Oreilly MD  05/14/23 9071    "

## 2023-05-14 NOTE — DISCHARGE INSTRUCTIONS
Follow-up with the primary care doctor within 1 week  Return to the emergency room if symptoms worsen or if you have any other concerns

## 2023-05-14 NOTE — ED ATTENDING ATTESTATION
2023  Christina BATISTA DO, saw and evaluated the patient  I have discussed the patient with the resident/non-physician practitioner and agree with the resident's/non-physician practitioner's findings, Plan of Care, and MDM as documented in the resident's/non-physician practitioner's note, except where noted  All available labs and Radiology studies were reviewed  I was present for key portions of any procedure(s) performed by the resident/non-physician practitioner and I was immediately available to provide assistance  At this point I agree with the current assessment done in the Emergency Department  I have conducted an independent evaluation of this patient a history and physical is as follows:    Car BATISTA DO, saw and evaluated the patient  All available labs and X-rays were reviewed  I discussed the patient with the resident / non-physician and agree with the resident's / non-physician practitioner's findings and plan as documented in the resident's / non-physician practicitioner's note, except where noted  At this point, I agree with the current assessment done in the ED      NAME: Scott Moore  AGE: 25 y o  SEX: male  : 2000   MRN: [id number]  ENCOUNTER: [id number]    DATE: 2023  TIME: 3:14 AM      History of Present Illness   Scott Moore is a 25 y o  male who presents with Chest Pain (Mid-left sided chest pain intermittent for 2-3 days, more frequent today  )    has a past medical history of GERD (gastroesophageal reflux disease)        Past Medical History     Past Medical History:   Diagnosis Date   • GERD (gastroesophageal reflux disease)        Past Surgical History   History reviewed  No pertinent surgical history      Social History     Social History     Substance and Sexual Activity   Alcohol Use Not Currently     Social History     Substance and Sexual Activity   Drug Use Not Currently   • Types: Marijuana    Comment: last time a month ago     Social History     Tobacco Use   Smoking Status Never   Smokeless Tobacco Never       Family History     Family History   Problem Relation Age of Onset   • Drug abuse Maternal Aunt    • Alcohol abuse Maternal Aunt    • Anxiety disorder Paternal Grandmother    • Completed Suicide  Cousin        Medications Prior to Admission     Prior to Admission medications    Medication Sig Start Date End Date Taking? Authorizing Provider   busPIRone (BUSPAR) 5 mg tablet Take 2 tablets (10 mg total) by mouth 2 (two) times a day 4/28/23   Sampson Leyden Diaz-Burney, MD   cetirizine (ZyrTEC) 10 mg tablet Take 1 tablet (10 mg total) by mouth daily  Patient not taking: Reported on 4/28/2023 1/24/23   KURT Ma   fluticasone [facility]) 50 mcg/act nasal spray 2 sprays into each nostril daily 10/25/22   Juventino Mae DO   hydrOXYzine pamoate (VISTARIL) 50 mg capsule take 1 capsule by mouth twice a day if needed for anxiety 3/28/23   Historical Provider, MD   mirtazapine (REMERON) 30 mg tablet Take 1 tablet (30 mg total) by mouth every evening 4/18/23   Juventino Mae DO   omeprazole (PriLOSEC) 20 mg delayed release capsule Take 1 capsule (20 mg total) by mouth daily before breakfast 1/24/23   KURT Ma   sertraline (ZOLOFT) 100 mg tablet Take 1 tablet (100 mg total) by mouth daily 5/5/23   KURT Fernandez   sertraline (Zoloft) 50 mg tablet Take 1 tablet (50 mg total) by mouth daily 3/16/23   Kay Mae DO       Allergies   No Known Allergies    Objective     Vitals:    05/14/23 0005 05/14/23 0007 05/14/23 0239   BP:  122/80 125/69   BP Location:  Right arm Right arm   Pulse:  102 87   Resp:  16 18   Temp:  98 3 °F (36 8 °C)    TempSrc:  Oral    SpO2:  98% 100%   Weight: 58 9 kg (129 lb 13 6 oz)       Body mass index is 19 18 kg/m²    No intake or output data in the 24 hours ending 05/14/23 0314  Invasive Devices     None                 Physical Exam  General: awake, alert, no acute distress  Head: normocephalic, atraumatic  Eyes: no scleral icterus  Ears: external ears normal, hearing grossly intact  Nose: external exam grossly normal  Neck: symmetric, No JVD noted, trachea midline  Pulmonary: no respiratory distress, no tachypnea noted  Cardiovascular: appears well perfused  Abdomen: no distention noted  Musculoskeletal: no deformities noted, tone normal  Neuro: grossly non-focal  Psych: mood and affect appropriate    Lab Results:  Labs Reviewed - No data to display      Imaging:   XR chest 2 views   ED Interpretation   The CXR was ordered by myself and interpreted by me independently  On my read, it appears without acute abnormalities:  - The  cardiomediastinal  silhouette  is  unremarkable     - The  lungs  are  clear  - No  pleural  effusions   - No  pneumothorax  - The  pulmonary  vasculature  is  within  normal  limits     - The  trachea  is  midline     - Bony  thorax  is  unremarkable                       Medications given in Emergency Department     Medication Administration - last 24 hours from 05/13/2023 0314 to 05/14/2023 0314       Date/Time Order Dose Route Action Action by     05/14/2023 0206 EDT ketorolac (TORADOL) injection 30 mg 30 mg Intramuscular Given Zeyad Pathak RN          Assessment and Plan  Chest pain    Low suspicion for ACS or PE  Samuel Alexander is negative  EKG is nonischemic, shows no signs of pericarditis, arrhythmia, ST elevation or depression  Overall benign exam but given complaints appears to be more muscular  We will give Tylenol, Toradol and obtain an x-ray  I do not feel that labs are needed at this time  No acute abnormality on chest x-ray  Patient feels better with medications here  Discussed with the patient about the different possibilities given the positional effect of this pain    We talked about possible pericarditis, costochondritis, etc   Patient is stable and improving with meds here so I do not feel that he needs to stay for any further evaluation  Advise following up with his PCP or returning to the ER if symptoms worsen or new symptoms develop such as shortness of breath with exertion, leg swelling, worsening chest pain, diaphoresis, fevers, etc   Patient understands and agrees with plan of care  Questions and concerns answered  Active Problems:    * No active hospital problems  *      Final Diagnosis:  1   Chest pain        ED Course         Critical Care Time  Procedures

## 2023-05-16 ENCOUNTER — OFFICE VISIT (OUTPATIENT)
Dept: URGENT CARE | Age: 23
End: 2023-05-16

## 2023-05-16 VITALS
HEIGHT: 69 IN | WEIGHT: 126 LBS | SYSTOLIC BLOOD PRESSURE: 124 MMHG | BODY MASS INDEX: 18.66 KG/M2 | RESPIRATION RATE: 18 BRPM | DIASTOLIC BLOOD PRESSURE: 80 MMHG | OXYGEN SATURATION: 99 % | TEMPERATURE: 98 F | HEART RATE: 84 BPM

## 2023-05-16 DIAGNOSIS — J02.9 SORE THROAT: Primary | ICD-10-CM

## 2023-05-16 LAB — S PYO AG THROAT QL: NEGATIVE

## 2023-05-17 ENCOUNTER — OFFICE VISIT (OUTPATIENT)
Dept: FAMILY MEDICINE CLINIC | Facility: CLINIC | Age: 23
End: 2023-05-17

## 2023-05-17 VITALS
SYSTOLIC BLOOD PRESSURE: 110 MMHG | OXYGEN SATURATION: 96 % | BODY MASS INDEX: 19.05 KG/M2 | WEIGHT: 128.6 LBS | TEMPERATURE: 97.8 F | HEIGHT: 69 IN | DIASTOLIC BLOOD PRESSURE: 64 MMHG | HEART RATE: 88 BPM

## 2023-05-17 DIAGNOSIS — J31.0 RHINOSINUSITIS: ICD-10-CM

## 2023-05-17 DIAGNOSIS — J32.9 RHINOSINUSITIS: ICD-10-CM

## 2023-05-17 DIAGNOSIS — K21.9 GASTROESOPHAGEAL REFLUX DISEASE WITHOUT ESOPHAGITIS: ICD-10-CM

## 2023-05-17 DIAGNOSIS — R07.1 CHEST PAIN ON BREATHING: Primary | ICD-10-CM

## 2023-05-17 RX ORDER — FLUTICASONE PROPIONATE 50 MCG
2 SPRAY, SUSPENSION (ML) NASAL DAILY
Qty: 18.2 ML | Refills: 2 | Status: SHIPPED | OUTPATIENT
Start: 2023-05-17

## 2023-05-17 NOTE — PROGRESS NOTES
[facility]        NAME: Colette Dominguez is a 25 y o  male  : 2000    MRN: [id number]  DATE: May 17, 2023  TIME: 11:24 AM    Assessment and Plan   Sore throat [J02 9]  1  Sore throat  POCT rapid strepA    Throat culture        Patient presents with c/o sore throat that started this am  Denies fevers  Hx of seasonal allergies  No meds taken  Assessment notes no adenopathy, erythema or exudate  POCT strep neg- will send culture  Patient Instructions       Follow up with PCPas needed  Chief Complaint     Chief Complaint   Patient presents with   • Sore Throat     Sore throat started this morning          History of Present Illness       Patient presents with c/o sore throat that started this am  Denies fevers  Hx of seasonal allergies  No meds taken  Assessment notes no adenopathy, erythema or exudate  POCT strep neg- will send culture  Review of Systems   Review of Systems   HENT: Positive for postnasal drip and sore throat  Allergic/Immunologic: Positive for environmental allergies  All other systems reviewed and are negative          Current Medications       Current Outpatient Medications:   •  busPIRone (BUSPAR) 5 mg tablet, Take 2 tablets (10 mg total) by mouth 2 (two) times a day, Disp: 180 tablet, Rfl: 3  •  mirtazapine (REMERON) 30 mg tablet, Take 1 tablet (30 mg total) by mouth every evening, Disp: 90 tablet, Rfl: 0  •  sertraline (Zoloft) 50 mg tablet, Take 1 tablet (50 mg total) by mouth daily, Disp: 90 tablet, Rfl: 0  •  cetirizine (ZyrTEC) 10 mg tablet, Take 1 tablet (10 mg total) by mouth daily (Patient not taking: Reported on 2023), Disp: 30 tablet, Rfl: 0  •  fluticasone (FLONASE) 50 mcg/act nasal spray, 2 sprays into each nostril daily (Patient not taking: Reported on 2023), Disp: 18 2 mL, Rfl: 2  •  hydrOXYzine pamoate (VISTARIL) 50 mg capsule, take 1 capsule by mouth twice a day if needed for anxiety (Patient not taking: Reported on 2023), Disp: , "Rfl:   •  omeprazole (PriLOSEC) 20 mg delayed release capsule, Take 1 capsule (20 mg total) by mouth daily before breakfast (Patient not taking: Reported on 5/14/2023), Disp: 30 capsule, Rfl: 3  •  sertraline (ZOLOFT) 100 mg tablet, Take 1 tablet (100 mg total) by mouth daily (Patient not taking: Reported on 5/14/2023), Disp: 100 tablet, Rfl: 0    Current Allergies     Allergies as of 05/16/2023   • (No Known Allergies)            The following portions of the patient's history were reviewed and updated as appropriate: allergies, current medications, past family history, past medical history, past social history, past surgical history and problem list      Past Medical History:   Diagnosis Date   • GERD (gastroesophageal reflux disease)        No past surgical history on file  Family History   Problem Relation Age of Onset   • Drug abuse Maternal Aunt    • Alcohol abuse Maternal Aunt    • Anxiety disorder Paternal Grandmother    • Completed Suicide  Cousin          Medications have been verified  Objective   /80   Pulse 84   Temp 98 °F (36 7 °C)   Resp 18   Ht 5' 9\" (1 753 m)   Wt 57 2 kg (126 lb)   SpO2 99%   BMI 18 61 kg/m²   No LMP for male patient  Physical Exam     Physical Exam  Vitals reviewed  HENT:      Right Ear: Tympanic membrane normal       Left Ear: Tympanic membrane normal       Nose: Congestion present  Mouth/Throat:      Pharynx: No posterior oropharyngeal erythema  Tonsils: No tonsillar exudate  Lymphadenopathy:      Cervical: No cervical adenopathy                     "

## 2023-05-17 NOTE — ASSESSMENT & PLAN NOTE
· msk vs asthmatic  · Negative cardiac work up in ed  · Lungs are cta b/l no w/r/r  · Heart exam is benign  · Mild pain on palpation at right 7th rib  · Continue anti-inflammatories  · Would rather patient use tylenol than ibuprofen due to hx of gerd  · Consider pft's

## 2023-05-18 LAB — BACTERIA THROAT CULT: NORMAL

## 2023-05-22 ENCOUNTER — OFFICE VISIT (OUTPATIENT)
Dept: URGENT CARE | Age: 23
End: 2023-05-22

## 2023-05-22 VITALS
RESPIRATION RATE: 20 BRPM | DIASTOLIC BLOOD PRESSURE: 85 MMHG | SYSTOLIC BLOOD PRESSURE: 128 MMHG | HEIGHT: 69 IN | HEART RATE: 95 BPM | TEMPERATURE: 98 F | OXYGEN SATURATION: 97 % | BODY MASS INDEX: 19.52 KG/M2 | WEIGHT: 131.8 LBS

## 2023-05-22 DIAGNOSIS — R59.9 LYMPH NODE ENLARGEMENT: Primary | ICD-10-CM

## 2023-05-22 NOTE — PROGRESS NOTES
[address] [facility]        NAME: Sundeep Olmos is a 25 y o  male  : 2000    MRN: [id number]  DATE: May 22, 2023  TIME: 9:38 AM    Assessment and Plan   Lymph node enlargement [R59 9]  1  Lymph node enlargement          Patient with x 1 days of enlargement of tonsillar and posterior auricular lymph nodes  Denies fever, viral illness, or other sick symptoms now or recently  Very minimal enlargement  Educated that lymph nodes are part of the immune system and can enlarge with illness and take a little while to return to normal size  Will recommend f/u with pcp to further evaluate  Patient verbalized understanding  Patient Instructions       Follow up with PCP in 3-5 days  Proceed to  ER if symptoms worsen  Chief Complaint     Chief Complaint   Patient presents with   • Mass     Pt states he noticed lump below his left ear two days ago  Denies pain  History of Present Illness       Patient arrives with complaints of left-sided mass behind the ear x2 days  Denies any COVID viral symptoms fever  Patient does report having a lot of anxiety about health recently      Review of Systems   Review of Systems   Constitutional: Negative for activity change, appetite change, chills, fatigue and fever  HENT: Negative for congestion, rhinorrhea, sinus pressure, sinus pain and sore throat  Respiratory: Negative for cough, chest tightness and shortness of breath  Gastrointestinal: Negative for constipation, diarrhea, nausea and vomiting  Musculoskeletal: Negative for myalgias  Skin: Negative for color change, pallor and rash  Neurological: Negative for dizziness, syncope, weakness, light-headedness and headaches  Hematological: Negative for adenopathy  Psychiatric/Behavioral: Negative for agitation and confusion           Current Medications       Current Outpatient Medications:   •  busPIRone (BUSPAR) 5 mg tablet, Take 2 tablets (10 mg total) by mouth 2 (two) times a day, Disp: 180 "tablet, Rfl: 3  •  fluticasone (FLONASE) 50 mcg/act nasal spray, 2 sprays into each nostril daily, Disp: 18 2 mL, Rfl: 2  •  hydrOXYzine pamoate (VISTARIL) 50 mg capsule, take 1 capsule by mouth twice a day if needed for anxiety, Disp: , Rfl:   •  mirtazapine (REMERON) 30 mg tablet, Take 1 tablet (30 mg total) by mouth every evening, Disp: 90 tablet, Rfl: 0  •  omeprazole (PriLOSEC) 20 mg delayed release capsule, Take 1 capsule (20 mg total) by mouth daily before breakfast, Disp: 30 capsule, Rfl: 3  •  sertraline (ZOLOFT) 100 mg tablet, Take 1 tablet (100 mg total) by mouth daily, Disp: 100 tablet, Rfl: 0  •  sertraline (Zoloft) 50 mg tablet, Take 1 tablet (50 mg total) by mouth daily, Disp: 90 tablet, Rfl: 0    Current Allergies     Allergies as of 05/22/2023   • (No Known Allergies)            The following portions of the patient's history were reviewed and updated as appropriate: allergies, current medications, past family history, past medical history, past social history, past surgical history and problem list      Past Medical History:   Diagnosis Date   • GERD (gastroesophageal reflux disease)        History reviewed  No pertinent surgical history  Family History   Problem Relation Age of Onset   • Drug abuse Maternal Aunt    • Alcohol abuse Maternal Aunt    • Anxiety disorder Paternal Grandmother    • Completed Suicide  Cousin          Medications have been verified  Objective   /85 (BP Location: Right arm, Patient Position: Sitting, Cuff Size: Standard)   Pulse 95   Temp 98 °F (36 7 °C) (Temporal)   Resp 20   Ht 5' 9\" (1 753 m)   Wt 59 8 kg (131 lb 12 8 oz)   SpO2 97%   BMI 19 46 kg/m²   No LMP for male patient  Physical Exam     Physical Exam  Vitals and nursing note reviewed  Constitutional:       General: He is not in acute distress  Appearance: Normal appearance  He is not ill-appearing, toxic-appearing or diaphoretic  HENT:      Head: Normocephalic and atraumatic   " Right Ear: Tympanic membrane, ear canal and external ear normal  There is no impacted cerumen  Left Ear: Tympanic membrane, ear canal and external ear normal  There is no impacted cerumen  Nose: No congestion or rhinorrhea  Mouth/Throat:      Mouth: Mucous membranes are moist       Pharynx: No posterior oropharyngeal erythema  Eyes:      General: No scleral icterus  Right eye: No discharge  Left eye: No discharge  Conjunctiva/sclera: Conjunctivae normal    Cardiovascular:      Rate and Rhythm: Normal rate and regular rhythm  Pulmonary:      Effort: Pulmonary effort is normal  No respiratory distress  Breath sounds: Normal breath sounds  No stridor  No wheezing, rhonchi or rales  Musculoskeletal:         General: Normal range of motion  Cervical back: Normal range of motion  Lymphadenopathy:      Head:      Left side of head: Tonsillar and posterior auricular adenopathy present  Cervical: No cervical adenopathy  Skin:     General: Skin is dry  Neurological:      Mental Status: He is alert and oriented to person, place, and time  Psychiatric:         Mood and Affect: Mood normal          Behavior: Behavior normal          Thought Content:  Thought content normal          Judgment: Judgment normal

## 2023-06-15 ENCOUNTER — PATIENT OUTREACH (OUTPATIENT)
Dept: FAMILY MEDICINE CLINIC | Facility: CLINIC | Age: 23
End: 2023-06-15

## 2023-06-15 NOTE — PROGRESS NOTES
YULIANA LUDWIG placed follow up call to the patient, Sravan Pearson who advised sessions at [facility] have been going well  He had a session today with his therapist who he feels like she cares about her job and takes him seriously  He goes weekly  Sravan Pearson met with the psychiatrist  He missed second appointment this month but will see him next in July  He has enough medication  YULIANA LUDWIG and Sravan Pearson discussed progress with anxiety affecting his day to day  He advised that at this time nothing has changed but that he is trying  YULIANA LUDWIG provided supportive counseling  YULIANA LUDWIG will close referral as goals were met  Patient was connected with [facility] Hospitalization [facility] and left their program early AMA  However patient followed through with connecting with outpatient mental health provider which is a good fit for his needs at this time  He is aware can contact office or YULIANA LUDWIG directly as needed  YULIANA LUDWGI will continue to remain available for psychosocial support as needed

## 2023-06-21 ENCOUNTER — OFFICE VISIT (OUTPATIENT)
Dept: FAMILY MEDICINE CLINIC | Facility: CLINIC | Age: 23
End: 2023-06-21
Payer: COMMERCIAL

## 2023-06-21 VITALS
HEIGHT: 69 IN | DIASTOLIC BLOOD PRESSURE: 84 MMHG | WEIGHT: 140 LBS | OXYGEN SATURATION: 98 % | SYSTOLIC BLOOD PRESSURE: 112 MMHG | HEART RATE: 82 BPM | BODY MASS INDEX: 20.73 KG/M2 | TEMPERATURE: 98 F

## 2023-06-21 DIAGNOSIS — R79.89 ELEVATED LIVER FUNCTION TESTS: ICD-10-CM

## 2023-06-21 DIAGNOSIS — R07.82 INTERCOSTAL PAIN: Primary | ICD-10-CM

## 2023-06-21 PROBLEM — R07.1 CHEST PAIN ON BREATHING: Status: RESOLVED | Noted: 2023-05-17 | Resolved: 2023-06-21

## 2023-06-21 PROCEDURE — 99214 OFFICE O/P EST MOD 30 MIN: CPT | Performed by: FAMILY MEDICINE

## 2023-06-21 NOTE — PROGRESS NOTES
Assessment/Plan:      1  Intercostal pain  Assessment & Plan:  Waxes and wanes  Musculoskeletal in nature  Has had cardiac eval in the past that has been negative  Asymptomatic today  If recurrence, recommend topical lidoderm patches or biofreeze  Follow up as needed      2  Elevated liver function tests  Assessment & Plan:  New  Upon chart review, patient had elevated AST/ALT and total bilirubin during psych hospitalization in 3/2023  Patient does not drink alcohol  Reviewed potential side effects of current medication regimen  No abdominal pain or jaundice on exam  Recheck CMP     Orders:  -     Comprehensive metabolic panel; Future            Subjective:      Patient ID: Abelardo Ignacio is a 25 y o  male presents today for chest pain follow up  The pain is the same thing he had at last visit  Described as sharp and achey in his right chest wall  No pain with deep breathing  Improved with ibuprofen  HPI    The following portions of the patient's history were reviewed and updated as appropriate: allergies, current medications, past family history, past medical history, past social history, past surgical history and problem list     Review of Systems   Constitutional: Negative for activity change, chills, diaphoresis and fever  HENT: Negative for ear pain, hearing loss, postnasal drip, rhinorrhea, sinus pressure, sinus pain, sneezing and sore throat  Respiratory: Negative for cough, chest tightness, shortness of breath and wheezing  Cardiovascular: Positive for chest pain  Negative for palpitations and leg swelling  Gastrointestinal: Negative for abdominal pain, blood in stool, constipation, diarrhea, nausea and vomiting  Genitourinary: Negative for dysuria, frequency, hematuria and urgency  Musculoskeletal: Negative for arthralgias and myalgias  Neurological: Negative for dizziness, syncope, weakness, light-headedness, numbness and headaches           Objective:      /84 (BP "Location: Left arm, Patient Position: Sitting, Cuff Size: Adult)   Pulse 82   Temp 98 °F (36 7 °C) (Temporal)   Ht 5' 9\" (1 753 m)   Wt 63 5 kg (140 lb)   SpO2 98%   BMI 20 67 kg/m²          Physical Exam  Vitals reviewed  Constitutional:       General: He is not in acute distress  Appearance: He is well-developed  He is not diaphoretic  HENT:      Head: Normocephalic and atraumatic  Right Ear: External ear normal       Left Ear: External ear normal       Nose: Nose normal  No congestion  Mouth/Throat:      Mouth: Mucous membranes are moist       Pharynx: Oropharynx is clear  No oropharyngeal exudate  Eyes:      General: No scleral icterus  Pupils: Pupils are equal, round, and reactive to light  Neck:      Thyroid: No thyromegaly  Vascular: No JVD  Trachea: No tracheal deviation  Cardiovascular:      Rate and Rhythm: Normal rate and regular rhythm  Pulses: Normal pulses  Heart sounds: Normal heart sounds  No murmur heard  No friction rub  Pulmonary:      Effort: Pulmonary effort is normal  No respiratory distress  Breath sounds: Normal breath sounds  No wheezing or rales  Chest:      Chest wall: No tenderness  Abdominal:      General: Bowel sounds are normal  There is no distension  Palpations: Abdomen is soft  Tenderness: There is no abdominal tenderness  There is no right CVA tenderness, left CVA tenderness, guarding or rebound  Musculoskeletal:         General: No tenderness  Normal range of motion  Cervical back: Normal range of motion and neck supple  No tenderness  Right lower leg: No edema  Left lower leg: No edema  Lymphadenopathy:      Cervical: No cervical adenopathy  Skin:     General: Skin is warm and dry  Neurological:      Mental Status: He is alert and oriented to person, place, and time  Mental status is at baseline  Deep Tendon Reflexes: Reflexes are normal and symmetric     Psychiatric:         " Mood and Affect: Mood normal          Behavior: Behavior normal          Thought Content:  Thought content normal          Judgment: Judgment normal

## 2023-06-22 ENCOUNTER — APPOINTMENT (OUTPATIENT)
Dept: LAB | Age: 23
End: 2023-06-22
Payer: COMMERCIAL

## 2023-06-22 DIAGNOSIS — R79.89 ELEVATED LIVER FUNCTION TESTS: ICD-10-CM

## 2023-06-22 LAB
ALBUMIN SERPL BCP-MCNC: 4 G/DL (ref 3.5–5)
ALP SERPL-CCNC: 69 U/L (ref 46–116)
ALT SERPL W P-5'-P-CCNC: 92 U/L (ref 12–78)
ANION GAP SERPL CALCULATED.3IONS-SCNC: -1 MMOL/L
AST SERPL W P-5'-P-CCNC: 53 U/L (ref 5–45)
BILIRUB SERPL-MCNC: 0.39 MG/DL (ref 0.2–1)
BUN SERPL-MCNC: 16 MG/DL (ref 5–25)
CALCIUM SERPL-MCNC: 9.3 MG/DL (ref 8.3–10.1)
CHLORIDE SERPL-SCNC: 112 MMOL/L (ref 96–108)
CO2 SERPL-SCNC: 29 MMOL/L (ref 21–32)
CREAT SERPL-MCNC: 0.97 MG/DL (ref 0.6–1.3)
GFR SERPL CREATININE-BSD FRML MDRD: 111 ML/MIN/1.73SQ M
GLUCOSE P FAST SERPL-MCNC: 96 MG/DL (ref 65–99)
POTASSIUM SERPL-SCNC: 4 MMOL/L (ref 3.5–5.3)
PROT SERPL-MCNC: 7.3 G/DL (ref 6.4–8.4)
SODIUM SERPL-SCNC: 140 MMOL/L (ref 135–147)

## 2023-06-22 PROCEDURE — 80053 COMPREHEN METABOLIC PANEL: CPT

## 2023-06-22 PROCEDURE — 36415 COLL VENOUS BLD VENIPUNCTURE: CPT

## 2023-06-22 NOTE — ASSESSMENT & PLAN NOTE
Waxes and wanes  · Musculoskeletal in nature  · Has had cardiac eval in the past that has been negative  · Asymptomatic today  · If recurrence, recommend topical lidoderm patches or biofreeze  · Follow up as needed

## 2023-06-22 NOTE — ASSESSMENT & PLAN NOTE
New  · Upon chart review, patient had elevated AST/ALT and total bilirubin during psych hospitalization in 3/2023  · Patient does not drink alcohol  · Reviewed potential side effects of current medication regimen  · No abdominal pain or jaundice on exam  · Recheck CMP

## 2023-06-23 DIAGNOSIS — R79.89 ELEVATED LIVER FUNCTION TESTS: Primary | ICD-10-CM

## 2023-06-26 ENCOUNTER — TELEPHONE (OUTPATIENT)
Dept: FAMILY MEDICINE CLINIC | Facility: CLINIC | Age: 23
End: 2023-06-26

## 2023-07-18 ENCOUNTER — APPOINTMENT (OUTPATIENT)
Dept: LAB | Age: 23
End: 2023-07-18
Payer: COMMERCIAL

## 2023-07-18 DIAGNOSIS — R79.89 ELEVATED LIVER FUNCTION TESTS: ICD-10-CM

## 2023-07-18 LAB
ALBUMIN SERPL BCP-MCNC: 3.9 G/DL (ref 3.5–5)
ALP SERPL-CCNC: 64 U/L (ref 46–116)
ALT SERPL W P-5'-P-CCNC: 63 U/L (ref 12–78)
ANION GAP SERPL CALCULATED.3IONS-SCNC: 1 MMOL/L
AST SERPL W P-5'-P-CCNC: 21 U/L (ref 5–45)
BILIRUB SERPL-MCNC: 0.57 MG/DL (ref 0.2–1)
BUN SERPL-MCNC: 12 MG/DL (ref 5–25)
CALCIUM SERPL-MCNC: 9.1 MG/DL (ref 8.3–10.1)
CHLORIDE SERPL-SCNC: 111 MMOL/L (ref 96–108)
CO2 SERPL-SCNC: 28 MMOL/L (ref 21–32)
CREAT SERPL-MCNC: 0.99 MG/DL (ref 0.6–1.3)
GFR SERPL CREATININE-BSD FRML MDRD: 107 ML/MIN/1.73SQ M
GLUCOSE P FAST SERPL-MCNC: 104 MG/DL (ref 65–99)
POTASSIUM SERPL-SCNC: 3.9 MMOL/L (ref 3.5–5.3)
PROT SERPL-MCNC: 7.1 G/DL (ref 6.4–8.4)
SODIUM SERPL-SCNC: 140 MMOL/L (ref 135–147)

## 2023-07-18 PROCEDURE — 80053 COMPREHEN METABOLIC PANEL: CPT

## 2023-07-18 PROCEDURE — 36415 COLL VENOUS BLD VENIPUNCTURE: CPT

## 2023-08-07 PROBLEM — R73.03 PREDIABETES: Status: ACTIVE | Noted: 2023-08-07

## 2023-09-20 ENCOUNTER — HOSPITAL ENCOUNTER (EMERGENCY)
Facility: HOSPITAL | Age: 23
Discharge: HOME | End: 2023-09-20
Attending: EMERGENCY MEDICINE
Payer: COMMERCIAL

## 2023-09-20 VITALS
TEMPERATURE: 97.9 F | DIASTOLIC BLOOD PRESSURE: 68 MMHG | HEART RATE: 98 BPM | OXYGEN SATURATION: 97 % | HEIGHT: 69 IN | SYSTOLIC BLOOD PRESSURE: 111 MMHG | RESPIRATION RATE: 18 BRPM | BODY MASS INDEX: 20.67 KG/M2

## 2023-09-20 DIAGNOSIS — R19.7 DIARRHEA: ICD-10-CM

## 2023-09-20 DIAGNOSIS — R11.2 NAUSEA AND VOMITING: Primary | ICD-10-CM

## 2023-09-20 DIAGNOSIS — R10.13 EPIGASTRIC BURNING SENSATION: ICD-10-CM

## 2023-09-20 LAB
ALBUMIN SERPL BCP-MCNC: 5 G/DL (ref 3.5–5)
ALP SERPL-CCNC: 60 U/L (ref 34–104)
ALT SERPL W P-5'-P-CCNC: 33 U/L (ref 7–52)
ANION GAP SERPL CALCULATED.3IONS-SCNC: 11 MMOL/L
AST SERPL W P-5'-P-CCNC: 20 U/L (ref 13–39)
BASOPHILS # BLD AUTO: 0.02 THOUSANDS/ÂΜL (ref 0–0.1)
BASOPHILS NFR BLD AUTO: 0 % (ref 0–1)
BILIRUB SERPL-MCNC: 0.81 MG/DL (ref 0.2–1)
BUN SERPL-MCNC: 16 MG/DL (ref 5–25)
CALCIUM SERPL-MCNC: 10 MG/DL (ref 8.4–10.2)
CHLORIDE SERPL-SCNC: 107 MMOL/L (ref 96–108)
CO2 SERPL-SCNC: 22 MMOL/L (ref 21–32)
CREAT SERPL-MCNC: 0.89 MG/DL (ref 0.6–1.3)
EOSINOPHIL # BLD AUTO: 0 THOUSAND/ÂΜL (ref 0–0.61)
EOSINOPHIL NFR BLD AUTO: 0 % (ref 0–6)
ERYTHROCYTE [DISTWIDTH] IN BLOOD BY AUTOMATED COUNT: 12.3 % (ref 11.6–15.1)
GFR SERPL CREATININE-BSD FRML MDRD: 121 ML/MIN/1.73SQ M
GLUCOSE SERPL-MCNC: 119 MG/DL (ref 65–140)
HCT VFR BLD AUTO: 47.8 % (ref 36.5–49.3)
HGB BLD-MCNC: 16.3 G/DL (ref 12–17)
IMM GRANULOCYTES # BLD AUTO: 0.04 THOUSAND/UL (ref 0–0.2)
IMM GRANULOCYTES NFR BLD AUTO: 0 % (ref 0–2)
LIPASE SERPL-CCNC: <6 U/L (ref 11–82)
LYMPHOCYTES # BLD AUTO: 0.9 THOUSANDS/ÂΜL (ref 0.6–4.47)
LYMPHOCYTES NFR BLD AUTO: 10 % (ref 14–44)
MCH RBC QN AUTO: 29.6 PG (ref 26.8–34.3)
MCHC RBC AUTO-ENTMCNC: 34.1 G/DL (ref 31.4–37.4)
MCV RBC AUTO: 87 FL (ref 82–98)
MONOCYTES # BLD AUTO: 0.44 THOUSAND/ÂΜL (ref 0.17–1.22)
MONOCYTES NFR BLD AUTO: 5 % (ref 4–12)
NEUTROPHILS # BLD AUTO: 7.7 THOUSANDS/ÂΜL (ref 1.85–7.62)
NEUTS SEG NFR BLD AUTO: 85 % (ref 43–75)
NRBC BLD AUTO-RTO: 0 /100 WBCS
PLATELET # BLD AUTO: 264 THOUSANDS/UL (ref 149–390)
PMV BLD AUTO: 10.5 FL (ref 8.9–12.7)
POTASSIUM SERPL-SCNC: 3.4 MMOL/L (ref 3.5–5.3)
PROT SERPL-MCNC: 7.6 G/DL (ref 6.4–8.4)
RBC # BLD AUTO: 5.51 MILLION/UL (ref 3.88–5.62)
SODIUM SERPL-SCNC: 140 MMOL/L (ref 135–147)
WBC # BLD AUTO: 9.1 THOUSAND/UL (ref 4.31–10.16)

## 2023-09-20 PROCEDURE — 36415 COLL VENOUS BLD VENIPUNCTURE: CPT | Performed by: EMERGENCY MEDICINE

## 2023-09-20 PROCEDURE — 99283 EMERGENCY DEPT VISIT LOW MDM: CPT

## 2023-09-20 PROCEDURE — 96374 THER/PROPH/DIAG INJ IV PUSH: CPT

## 2023-09-20 PROCEDURE — 99284 EMERGENCY DEPT VISIT MOD MDM: CPT | Performed by: EMERGENCY MEDICINE

## 2023-09-20 PROCEDURE — 96375 TX/PRO/DX INJ NEW DRUG ADDON: CPT

## 2023-09-20 PROCEDURE — 96361 HYDRATE IV INFUSION ADD-ON: CPT

## 2023-09-20 PROCEDURE — 83690 ASSAY OF LIPASE: CPT | Performed by: EMERGENCY MEDICINE

## 2023-09-20 PROCEDURE — 80053 COMPREHEN METABOLIC PANEL: CPT | Performed by: EMERGENCY MEDICINE

## 2023-09-20 PROCEDURE — 85025 COMPLETE CBC W/AUTO DIFF WBC: CPT | Performed by: EMERGENCY MEDICINE

## 2023-09-20 RX ORDER — SUCRALFATE 1 G/1
1 TABLET ORAL 4 TIMES DAILY
Qty: 20 TABLET | Refills: 0 | Status: SHIPPED | OUTPATIENT
Start: 2023-09-20

## 2023-09-20 RX ORDER — ONDANSETRON 4 MG/1
4 TABLET, ORALLY DISINTEGRATING ORAL EVERY 6 HOURS PRN
Qty: 20 TABLET | Refills: 0 | Status: SHIPPED | OUTPATIENT
Start: 2023-09-20

## 2023-09-20 RX ORDER — METOCLOPRAMIDE HYDROCHLORIDE 5 MG/ML
10 INJECTION INTRAMUSCULAR; INTRAVENOUS ONCE
Status: COMPLETED | OUTPATIENT
Start: 2023-09-20 | End: 2023-09-20

## 2023-09-20 RX ORDER — KETOROLAC TROMETHAMINE 30 MG/ML
15 INJECTION, SOLUTION INTRAMUSCULAR; INTRAVENOUS ONCE
Status: COMPLETED | OUTPATIENT
Start: 2023-09-20 | End: 2023-09-20

## 2023-09-20 RX ORDER — ONDANSETRON 2 MG/ML
4 INJECTION INTRAMUSCULAR; INTRAVENOUS ONCE
Status: COMPLETED | OUTPATIENT
Start: 2023-09-20 | End: 2023-09-20

## 2023-09-20 RX ORDER — DIPHENHYDRAMINE HYDROCHLORIDE 50 MG/ML
25 INJECTION INTRAMUSCULAR; INTRAVENOUS ONCE
Status: COMPLETED | OUTPATIENT
Start: 2023-09-20 | End: 2023-09-20

## 2023-09-20 RX ADMIN — METOCLOPRAMIDE 10 MG: 5 INJECTION, SOLUTION INTRAMUSCULAR; INTRAVENOUS at 17:00

## 2023-09-20 RX ADMIN — DIPHENHYDRAMINE HYDROCHLORIDE 25 MG: 50 INJECTION, SOLUTION INTRAMUSCULAR; INTRAVENOUS at 18:00

## 2023-09-20 RX ADMIN — SODIUM CHLORIDE 1000 ML: 0.9 INJECTION, SOLUTION INTRAVENOUS at 16:27

## 2023-09-20 RX ADMIN — KETOROLAC TROMETHAMINE 15 MG: 30 INJECTION, SOLUTION INTRAMUSCULAR; INTRAVENOUS at 16:27

## 2023-09-20 RX ADMIN — ONDANSETRON 4 MG: 2 INJECTION INTRAMUSCULAR; INTRAVENOUS at 16:25

## 2023-09-20 NOTE — ED PROVIDER NOTES
History  Chief Complaint   Patient presents with   • Vomiting     Pt came in via EMS, pt reports vomiting for the past 7 hrs, c/o if mid abd pain and nausea. 26 y.o. M w/h/o GERD p/w N/V x 7 hours. Associated with epigastric burning. Also had nonbloody, watery diarrhea earlier today. Denies F/C, URI complaints, sick contacts, recent travel. History provided by:  Patient   used: No    Vomiting  Associated symptoms: abdominal pain and diarrhea    Associated symptoms: no chills, no cough, no fever, no myalgias and no sore throat    Risk factors: no sick contacts, no suspect food intake and no travel to endemic areas        Prior to Admission Medications   Prescriptions Last Dose Informant Patient Reported?  Taking?   busPIRone (BUSPAR) 15 mg tablet Not Taking  Yes No   Sig: Take 15 mg by mouth 2 (two) times a day   Patient not taking: Reported on 2023   busPIRone (BUSPAR) 5 mg tablet Not Taking  No No   Sig: Take 2 tablets (10 mg total) by mouth 2 (two) times a day   Patient not taking: Reported on 2023   fluticasone (FLONASE) 50 mcg/act nasal spray Not Taking  No No   Si sprays into each nostril daily   Patient not taking: Reported on 2023   hydrOXYzine pamoate (VISTARIL) 50 mg capsule Not Taking Self Yes No   Sig: take 1 capsule by mouth twice a day if needed for anxiety   Patient not taking: Reported on 2023   mirtazapine (REMERON) 30 mg tablet   No Yes   Sig: Take 1 tablet (30 mg total) by mouth every evening   omeprazole (PriLOSEC) 20 mg delayed release capsule Not Taking Self No No   Sig: Take 1 capsule (20 mg total) by mouth daily before breakfast   Patient not taking: Reported on 2023   sertraline (ZOLOFT) 100 mg tablet Not Taking  No No   Sig: Take 1 tablet (100 mg total) by mouth daily   Patient not taking: Reported on 2023   sertraline (Zoloft) 50 mg tablet Not Taking Self No No   Sig: Take 1 tablet (50 mg total) by mouth daily   Patient not taking: Reported on 9/20/2023      Facility-Administered Medications: None       Past Medical History:   Diagnosis Date   • GERD (gastroesophageal reflux disease)        History reviewed. No pertinent surgical history. Family History   Problem Relation Age of Onset   • Drug abuse Maternal Aunt    • Alcohol abuse Maternal Aunt    • Anxiety disorder Paternal Grandmother    • Completed Suicide  Cousin      I have reviewed and agree with the history as documented. E-Cigarette/Vaping   • E-Cigarette Use Never User      E-Cigarette/Vaping Substances   • Nicotine No    • THC No    • CBD No    • Flavoring No    • Other No    • Unknown No      Social History     Tobacco Use   • Smoking status: Never   • Smokeless tobacco: Never   Vaping Use   • Vaping Use: Never used   Substance Use Topics   • Alcohol use: Not Currently   • Drug use: Not Currently     Types: Marijuana     Comment: last time a month ago       Review of Systems   Constitutional: Negative for chills and fever. HENT: Negative for rhinorrhea and sore throat. Respiratory: Negative for cough. Gastrointestinal: Positive for abdominal pain, diarrhea, nausea and vomiting. Negative for blood in stool. Musculoskeletal: Negative for myalgias. Physical Exam  Physical Exam  Vitals and nursing note reviewed. Constitutional:       General: He is not in acute distress. Appearance: Normal appearance. He is well-developed. He is not ill-appearing, toxic-appearing or diaphoretic. HENT:      Head: Normocephalic and atraumatic. Eyes:      General: No scleral icterus. Neck:      Vascular: No JVD. Cardiovascular:      Rate and Rhythm: Normal rate and regular rhythm. Heart sounds: Normal heart sounds. No murmur heard. Pulmonary:      Effort: Pulmonary effort is normal. No accessory muscle usage or respiratory distress. Breath sounds: Normal breath sounds. No stridor. No wheezing, rhonchi or rales.    Abdominal:      General: There is no distension. Palpations: Abdomen is soft. Abdomen is not rigid. There is no mass. Tenderness: There is no abdominal tenderness. There is no guarding or rebound. Negative signs include Willett's sign and McBurney's sign. Skin:     General: Skin is warm and dry. Coloration: Skin is not jaundiced or pale. Findings: No rash. Neurological:      Mental Status: He is alert. GCS: GCS eye subscore is 4. GCS verbal subscore is 5. GCS motor subscore is 6.          Vital Signs  ED Triage Vitals [09/20/23 1609]   Temperature Pulse Respirations Blood Pressure SpO2   97.9 °F (36.6 °C) 105 18 110/66 100 %      Temp Source Heart Rate Source Patient Position - Orthostatic VS BP Location FiO2 (%)   Oral Monitor Sitting Left arm --      Pain Score       4           Vitals:    09/20/23 1609 09/20/23 1815   BP: 110/66 111/68   Pulse: 105 98   Patient Position - Orthostatic VS: Sitting Lying         Visual Acuity      ED Medications  Medications   sodium chloride 0.9 % bolus 1,000 mL (0 mL Intravenous Stopped 9/20/23 1802)   ondansetron (ZOFRAN) injection 4 mg (4 mg Intravenous Given 9/20/23 1625)   ketorolac (TORADOL) injection 15 mg (15 mg Intravenous Given 9/20/23 1627)   metoclopramide (REGLAN) injection 10 mg (10 mg Intravenous Given 9/20/23 1700)   diphenhydrAMINE (BENADRYL) injection 25 mg (25 mg Intravenous Given 9/20/23 1800)       Diagnostic Studies  Results Reviewed     Procedure Component Value Units Date/Time    Comprehensive metabolic panel [[id number]]  (Abnormal) Collected: 09/20/23 1628    Lab Status: Final result Specimen: Blood from Arm, Right Updated: 09/20/23 1700     Sodium 140 mmol/L      Potassium 3.4 mmol/L      Chloride 107 mmol/L      CO2 22 mmol/L      ANION GAP 11 mmol/L      BUN 16 mg/dL      Creatinine 0.89 mg/dL      Glucose 119 mg/dL      Calcium 10.0 mg/dL      AST 20 U/L      ALT 33 U/L      Alkaline Phosphatase 60 U/L      Total Protein 7.6 g/dL      Albumin 5.0 g/dL      Total Bilirubin 0.81 mg/dL      eGFR 121 ml/min/1.73sq m     Narrative:      [facility] (CKD):   •  Stage 1 with normal or high GFR (GFR > 90 mL/min/1.73 square meters)  •  Stage 2 Mild CKD (GFR = 60-89 mL/min/1.73 square meters)  •  Stage 3A Moderate CKD (GFR = 45-59 mL/min/1.73 square meters)  •  Stage 3B Moderate CKD (GFR = 30-44 mL/min/1.73 square meters)  •  Stage 4 Severe CKD (GFR = 15-29 mL/min/1.73 square meters)  •  Stage 5 End Stage CKD (GFR <15 mL/min/1.73 square meters)  Note: GFR calculation is accurate only with a steady state creatinine    Lipase [[id number]]  (Abnormal) Collected: 09/20/23 1628    Lab Status: Final result Specimen: Blood from Arm, Right Updated: 09/20/23 1700     Lipase <6 u/L     CBC and differential [[id number]]  (Abnormal) Collected: 09/20/23 1628    Lab Status: Final result Specimen: Blood from Arm, Right Updated: 09/20/23 1644     WBC 9.10 Thousand/uL      RBC 5.51 Million/uL      Hemoglobin 16.3 g/dL      Hematocrit 47.8 %      MCV 87 fL      MCH 29.6 pg      MCHC 34.1 g/dL      RDW 12.3 %      MPV 10.5 fL      Platelets 359 Thousands/uL      nRBC 0 /100 WBCs      Neutrophils Relative 85 %      Immat GRANS % 0 %      Lymphocytes Relative 10 %      Monocytes Relative 5 %      Eosinophils Relative 0 %      Basophils Relative 0 %      Neutrophils Absolute 7.70 Thousands/µL      Immature Grans Absolute 0.04 Thousand/uL      Lymphocytes Absolute 0.90 Thousands/µL      Monocytes Absolute 0.44 Thousand/µL      Eosinophils Absolute 0.00 Thousand/µL      Basophils Absolute 0.02 Thousands/µL                  No orders to display              Procedures  Procedures         ED Course  ED Course as of 09/20/23 1829   Wed Sep 20, 2023   1616 Pulse: 105  Mildly tachy. Will give fluids. 1616 Temperature: 97.9 °F (36.6 °C)  Afebrile   1648 Hemoglobin: 16.3  Normal   1648 WBC: 9.10  Normal   1654 Pt reports he still nauseous.   Reglan ordered. 1702 Lipase(!): <6  Normal   1702 Comprehensive metabolic panel(!)  Unremarkable   1739 Updated pt and family on labs. Pt states nausea is improved but still has it and feels a little like he's "freaking out." Like from Reglan. Will give Benadryl. 1825 Pt reports feeling better. SBIRT 22yo+    Flowsheet Row Most Recent Value   Initial Alcohol Screen: US AUDIT-C     1. How often do you have a drink containing alcohol? 0 Filed at: 09/20/2023 1612   2. How many drinks containing alcohol do you have on a typical day you are drinking? 0 Filed at: 09/20/2023 1612   3a. Male UNDER 65: How often do you have five or more drinks on one occasion? 0 Filed at: 09/20/2023 1612   Audit-C Score 0 Filed at: 09/20/2023 1612   SHAISTA: How many times in the past year have you. .. Used an illegal drug or used a prescription medication for non-medical reasons? Never Filed at: 09/20/2023 1612                    Medical Decision Making  Will check CBC as marker of infection, CMP to r/o hepatitis/biliary disease, lipase to r/o pancreatitis, and symptomatic tx. Amount and/or Complexity of Data Reviewed  Labs: ordered. Risk  Prescription drug management. Disposition  Final diagnoses:   Nausea and vomiting   Diarrhea   Epigastric burning sensation     Time reflects when diagnosis was documented in both MDM as applicable and the Disposition within this note     Time User Action Codes Description Comment    9/20/2023  5:41 PM [address] [R11.2] Nausea and vomiting     9/20/2023  5:41 PM [address] [R19.7] Diarrhea     9/20/2023  5:41 PM [address] [R10.13] Epigastric burning sensation       ED Disposition     ED Disposition   Discharge    Condition   Stable    Date/Time   Wed Sep 20, 2023  6:25 PM    Comment   Alessandra Watkins discharge to home/self care.                Follow-up Information    None         Discharge Medication List as of 9/20/2023  6:25 PM      START taking these medications    Details   ondansetron (Zofran ODT) 4 mg disintegrating tablet Take 1 tablet (4 mg total) by mouth every 6 (six) hours as needed for nausea or vomiting, Starting Wed 9/20/2023, Normal      sucralfate (CARAFATE) 1 g tablet Take 1 tablet (1 g total) by mouth 4 (four) times a day, Starting Wed 9/20/2023, Normal         CONTINUE these medications which have NOT CHANGED    Details   !! busPIRone (BUSPAR) 15 mg tablet Take 15 mg by mouth 2 (two) times a day, Starting Mon 7/10/2023, Historical Med      !! busPIRone (BUSPAR) 5 mg tablet Take 2 tablets (10 mg total) by mouth 2 (two) times a day, Starting Fri 4/28/2023, No Print      fluticasone (FLONASE) 50 mcg/act nasal spray 2 sprays into each nostril daily, Starting Wed 5/17/2023, Normal      hydrOXYzine pamoate (VISTARIL) 50 mg capsule take 1 capsule by mouth twice a day if needed for anxiety, Historical Med      mirtazapine (REMERON) 30 mg tablet Take 1 tablet (30 mg total) by mouth every evening, Starting Tue 4/18/2023, No Print      omeprazole (PriLOSEC) 20 mg delayed release capsule Take 1 capsule (20 mg total) by mouth daily before breakfast, Starting Tue 1/24/2023, Normal      !! sertraline (ZOLOFT) 100 mg tablet Take 1 tablet (100 mg total) by mouth daily, Starting Fri 5/5/2023, Normal      !! sertraline (Zoloft) 50 mg tablet Take 1 tablet (50 mg total) by mouth daily, Starting Thu 3/16/2023, Normal       !! - Potential duplicate medications found. Please discuss with provider. No discharge procedures on file.     PDMP Review       Value Time User    PDMP Reviewed  Yes 4/11/2023  1:43 PM [address] [place] [address]          ED Provider  Electronically Signed by           [address] [place] [address],   09/20/23 5333

## 2023-09-22 ENCOUNTER — HOSPITAL ENCOUNTER (EMERGENCY)
Facility: HOSPITAL | Age: 23
Discharge: HOME | End: 2023-09-22
Attending: EMERGENCY MEDICINE
Payer: COMMERCIAL

## 2023-09-22 VITALS
HEART RATE: 100 BPM | DIASTOLIC BLOOD PRESSURE: 74 MMHG | OXYGEN SATURATION: 100 % | RESPIRATION RATE: 18 BRPM | TEMPERATURE: 98.2 F | SYSTOLIC BLOOD PRESSURE: 138 MMHG

## 2023-09-22 DIAGNOSIS — E87.6 HYPOKALEMIA: ICD-10-CM

## 2023-09-22 DIAGNOSIS — R11.2 NAUSEA AND VOMITING, UNSPECIFIED VOMITING TYPE: Primary | ICD-10-CM

## 2023-09-22 LAB
ALBUMIN SERPL BCP-MCNC: 4.7 G/DL (ref 3.5–5)
ALP SERPL-CCNC: 59 U/L (ref 34–104)
ALT SERPL W P-5'-P-CCNC: 30 U/L (ref 7–52)
ANION GAP SERPL CALCULATED.3IONS-SCNC: 13 MMOL/L
AST SERPL W P-5'-P-CCNC: 19 U/L (ref 13–39)
BASOPHILS # BLD AUTO: 0.02 THOUSANDS/ÂΜL (ref 0–0.1)
BASOPHILS NFR BLD AUTO: 0 % (ref 0–1)
BILIRUB SERPL-MCNC: 1.06 MG/DL (ref 0.2–1)
BUN SERPL-MCNC: 12 MG/DL (ref 5–25)
CALCIUM SERPL-MCNC: 9.7 MG/DL (ref 8.4–10.2)
CHLORIDE SERPL-SCNC: 107 MMOL/L (ref 96–108)
CO2 SERPL-SCNC: 21 MMOL/L (ref 21–32)
CREAT SERPL-MCNC: 0.89 MG/DL (ref 0.6–1.3)
EOSINOPHIL # BLD AUTO: 0 THOUSAND/ÂΜL (ref 0–0.61)
EOSINOPHIL NFR BLD AUTO: 0 % (ref 0–6)
ERYTHROCYTE [DISTWIDTH] IN BLOOD BY AUTOMATED COUNT: 12.4 % (ref 11.6–15.1)
GFR SERPL CREATININE-BSD FRML MDRD: 121 ML/MIN/1.73SQ M
GLUCOSE SERPL-MCNC: 123 MG/DL (ref 65–140)
HCT VFR BLD AUTO: 46.9 % (ref 36.5–49.3)
HGB BLD-MCNC: 16 G/DL (ref 12–17)
IMM GRANULOCYTES # BLD AUTO: 0.04 THOUSAND/UL (ref 0–0.2)
IMM GRANULOCYTES NFR BLD AUTO: 1 % (ref 0–2)
LIPASE SERPL-CCNC: 6 U/L (ref 11–82)
LYMPHOCYTES # BLD AUTO: 0.8 THOUSANDS/ÂΜL (ref 0.6–4.47)
LYMPHOCYTES NFR BLD AUTO: 12 % (ref 14–44)
MCH RBC QN AUTO: 29.6 PG (ref 26.8–34.3)
MCHC RBC AUTO-ENTMCNC: 34.1 G/DL (ref 31.4–37.4)
MCV RBC AUTO: 87 FL (ref 82–98)
MONOCYTES # BLD AUTO: 0.23 THOUSAND/ÂΜL (ref 0.17–1.22)
MONOCYTES NFR BLD AUTO: 4 % (ref 4–12)
NEUTROPHILS # BLD AUTO: 5.41 THOUSANDS/ÂΜL (ref 1.85–7.62)
NEUTS SEG NFR BLD AUTO: 83 % (ref 43–75)
NRBC BLD AUTO-RTO: 0 /100 WBCS
PLATELET # BLD AUTO: 248 THOUSANDS/UL (ref 149–390)
PMV BLD AUTO: 10.4 FL (ref 8.9–12.7)
POTASSIUM SERPL-SCNC: 3 MMOL/L (ref 3.5–5.3)
PROT SERPL-MCNC: 7.9 G/DL (ref 6.4–8.4)
RBC # BLD AUTO: 5.41 MILLION/UL (ref 3.88–5.62)
SODIUM SERPL-SCNC: 141 MMOL/L (ref 135–147)
WBC # BLD AUTO: 6.5 THOUSAND/UL (ref 4.31–10.16)

## 2023-09-22 PROCEDURE — 36415 COLL VENOUS BLD VENIPUNCTURE: CPT | Performed by: EMERGENCY MEDICINE

## 2023-09-22 PROCEDURE — 83690 ASSAY OF LIPASE: CPT | Performed by: EMERGENCY MEDICINE

## 2023-09-22 PROCEDURE — 96375 TX/PRO/DX INJ NEW DRUG ADDON: CPT

## 2023-09-22 PROCEDURE — 99283 EMERGENCY DEPT VISIT LOW MDM: CPT

## 2023-09-22 PROCEDURE — 85025 COMPLETE CBC W/AUTO DIFF WBC: CPT | Performed by: EMERGENCY MEDICINE

## 2023-09-22 PROCEDURE — 99284 EMERGENCY DEPT VISIT MOD MDM: CPT | Performed by: PHYSICIAN ASSISTANT

## 2023-09-22 PROCEDURE — 96374 THER/PROPH/DIAG INJ IV PUSH: CPT

## 2023-09-22 PROCEDURE — 96372 THER/PROPH/DIAG INJ SC/IM: CPT

## 2023-09-22 PROCEDURE — 96361 HYDRATE IV INFUSION ADD-ON: CPT

## 2023-09-22 PROCEDURE — 80053 COMPREHEN METABOLIC PANEL: CPT | Performed by: EMERGENCY MEDICINE

## 2023-09-22 RX ORDER — POTASSIUM CHLORIDE 20 MEQ/1
40 TABLET, EXTENDED RELEASE ORAL ONCE
Status: COMPLETED | OUTPATIENT
Start: 2023-09-22 | End: 2023-09-22

## 2023-09-22 RX ORDER — FAMOTIDINE 10 MG/ML
20 INJECTION, SOLUTION INTRAVENOUS ONCE
Status: COMPLETED | OUTPATIENT
Start: 2023-09-22 | End: 2023-09-22

## 2023-09-22 RX ORDER — HALOPERIDOL 5 MG/ML
5 INJECTION INTRAMUSCULAR ONCE
Status: COMPLETED | OUTPATIENT
Start: 2023-09-22 | End: 2023-09-22

## 2023-09-22 RX ORDER — KETOROLAC TROMETHAMINE 30 MG/ML
15 INJECTION, SOLUTION INTRAMUSCULAR; INTRAVENOUS ONCE
Status: COMPLETED | OUTPATIENT
Start: 2023-09-22 | End: 2023-09-22

## 2023-09-22 RX ORDER — ONDANSETRON 2 MG/ML
4 INJECTION INTRAMUSCULAR; INTRAVENOUS ONCE
Status: COMPLETED | OUTPATIENT
Start: 2023-09-22 | End: 2023-09-22

## 2023-09-22 RX ADMIN — POTASSIUM CHLORIDE 40 MEQ: 1500 TABLET, EXTENDED RELEASE ORAL at 14:09

## 2023-09-22 RX ADMIN — FAMOTIDINE 20 MG: 10 INJECTION INTRAVENOUS at 13:13

## 2023-09-22 RX ADMIN — HALOPERIDOL LACTATE 5 MG: 5 INJECTION, SOLUTION INTRAMUSCULAR at 13:28

## 2023-09-22 RX ADMIN — SODIUM CHLORIDE 1000 ML: 0.9 INJECTION, SOLUTION INTRAVENOUS at 13:07

## 2023-09-22 RX ADMIN — ONDANSETRON 4 MG: 2 INJECTION INTRAMUSCULAR; INTRAVENOUS at 13:02

## 2023-09-22 RX ADMIN — KETOROLAC TROMETHAMINE 15 MG: 30 INJECTION, SOLUTION INTRAMUSCULAR; INTRAVENOUS at 14:10

## 2023-09-22 NOTE — ED PROVIDER NOTES
History  Chief Complaint   Patient presents with   • Vomiting     Pt was seen here two days ago for N/V/D and given fluids. Pt reports feeling the same and cannot keep anything down, even liquids. This is a 45-year-old male with past medical history significant for GERD presenting to the emergency department today for nausea, vomiting, and epigastric pain. This began approximately 3 days ago. The patient notes that he was seen in the emergency department 2 days ago. He was feeling better after he was discharged and attained a bland diet at home until he became hungry and decided to eat a cheese steak. He has had numerous episodes of vomiting since early this morning. Vomit is nonbloody and nonbilious. It is associated with epigastric pain. He denies any associated chest pain or shortness of breath. He denies any dizziness, lightheadedness, or visual disturbances. He notes 1 episode of loose stool but denies any constipation, melena, or bright red blood per rectum. No fevers or chills. Using Zofran at home without relief. The patient is a daily marijuana user. In fact, patient began smoking marijuana last night before nausea returned. Patient denies other complaints at this time. History provided by:  Patient   used: No    Vomiting  Severity:  Moderate  Duration: four three days but worsening since this AM.  Timing:  Intermittent  Quality:  Stomach contents  Progression:  Worsening  Chronicity:  New  Recent urination:  Normal  Relieved by:  Nothing  Worsened by:  Nothing  Ineffective treatments:  Antiemetics  Associated symptoms: abdominal pain (epigastric only) and diarrhea (one episode only)    Associated symptoms: no arthralgias, no chills, no cough, no fever, no headaches, no myalgias, no sore throat and no URI        Prior to Admission Medications   Prescriptions Last Dose Informant Patient Reported?  Taking?   busPIRone (BUSPAR) 15 mg tablet Not Taking  Yes No   Sig: Take 15 mg by mouth 2 (two) times a day   Patient not taking: Reported on 2023   busPIRone (BUSPAR) 5 mg tablet Not Taking  No No   Sig: Take 2 tablets (10 mg total) by mouth 2 (two) times a day   Patient not taking: Reported on 2023   fluticasone (FLONASE) 50 mcg/act nasal spray Not Taking  No No   Si sprays into each nostril daily   Patient not taking: Reported on 2023   hydrOXYzine pamoate (VISTARIL) 50 mg capsule Not Taking Self Yes No   Sig: take 1 capsule by mouth twice a day if needed for anxiety   Patient not taking: Reported on 2023   mirtazapine (REMERON) 30 mg tablet Past Week  No Yes   Sig: Take 1 tablet (30 mg total) by mouth every evening   omeprazole (PriLOSEC) 20 mg delayed release capsule Not Taking Self No No   Sig: Take 1 capsule (20 mg total) by mouth daily before breakfast   Patient not taking: Reported on 2023   ondansetron (Zofran ODT) 4 mg disintegrating tablet 2023  No Yes   Sig: Take 1 tablet (4 mg total) by mouth every 6 (six) hours as needed for nausea or vomiting   sertraline (ZOLOFT) 100 mg tablet Not Taking  No No   Sig: Take 1 tablet (100 mg total) by mouth daily   Patient not taking: Reported on 2023   sertraline (Zoloft) 50 mg tablet Not Taking Self No No   Sig: Take 1 tablet (50 mg total) by mouth daily   Patient not taking: Reported on 2023   sucralfate (CARAFATE) 1 g tablet Not Taking  No No   Sig: Take 1 tablet (1 g total) by mouth 4 (four) times a day   Patient not taking: Reported on 2023      Facility-Administered Medications: None       Past Medical History:   Diagnosis Date   • GERD (gastroesophageal reflux disease)        History reviewed. No pertinent surgical history.     Family History   Problem Relation Age of Onset   • Drug abuse Maternal Aunt    • Alcohol abuse Maternal Aunt    • Anxiety disorder Paternal Grandmother    • Completed Suicide  Cousin      I have reviewed and agree with the history as documented. E-Cigarette/Vaping   • E-Cigarette Use Never User      E-Cigarette/Vaping Substances   • Nicotine No    • THC No    • CBD No    • Flavoring No    • Other No    • Unknown No      Social History     Tobacco Use   • Smoking status: Never   • Smokeless tobacco: Never   Vaping Use   • Vaping Use: Never used   Substance Use Topics   • Alcohol use: Not Currently   • Drug use: Not Currently     Types: Marijuana     Comment: last time a month ago       Review of Systems   Constitutional: Positive for appetite change. Negative for chills, diaphoresis, fatigue and fever. HENT: Negative for sore throat. Eyes: Negative for visual disturbance. Respiratory: Negative for cough, chest tightness, shortness of breath and wheezing. Cardiovascular: Negative for chest pain, palpitations and leg swelling. Gastrointestinal: Positive for abdominal pain (epigastric only), diarrhea (one episode only), nausea and vomiting. Negative for constipation. Genitourinary: Negative for dysuria, frequency and hematuria. Musculoskeletal: Negative for arthralgias, myalgias, neck pain and neck stiffness. Skin: Negative for rash and wound. Neurological: Negative for dizziness, seizures, syncope, weakness, light-headedness, numbness and headaches. Psychiatric/Behavioral: Negative for confusion. All other systems reviewed and are negative. Physical Exam  Physical Exam  Vitals and nursing note reviewed. Constitutional:       General: He is not in acute distress. Appearance: Normal appearance. He is normal weight. He is not ill-appearing, toxic-appearing or diaphoretic. HENT:      Head: Normocephalic and atraumatic. Nose: Nose normal. No congestion or rhinorrhea. Mouth/Throat:      Mouth: Mucous membranes are dry. Pharynx: No oropharyngeal exudate or posterior oropharyngeal erythema. Eyes:      General: No scleral icterus. Right eye: No discharge. Left eye: No discharge. Conjunctiva/sclera: Conjunctivae normal.   Cardiovascular:      Rate and Rhythm: Normal rate and regular rhythm. Pulses: Normal pulses. Heart sounds: Normal heart sounds. No murmur heard. No friction rub. No gallop. Pulmonary:      Effort: Pulmonary effort is normal. No respiratory distress. Breath sounds: Normal breath sounds. No stridor. No wheezing, rhonchi or rales. Chest:      Chest wall: No tenderness. Abdominal:      General: Abdomen is flat. There is no distension. Palpations: Abdomen is soft. Tenderness: There is no abdominal tenderness. There is no right CVA tenderness, left CVA tenderness, guarding or rebound. Comments: Abdomen is soft, nontender, nondistended, and without organomegaly; negative Willett sign; no rebound or Rovsing; nonperitoneal   Musculoskeletal:         General: Normal range of motion. Cervical back: Normal range of motion. No rigidity. Right lower leg: No edema. Left lower leg: No edema. Skin:     General: Skin is warm and dry. Capillary Refill: Capillary refill takes less than 2 seconds. Coloration: Skin is not jaundiced or pale. Neurological:      General: No focal deficit present. Mental Status: He is alert and oriented to person, place, and time. Mental status is at baseline.    Psychiatric:         Mood and Affect: Mood normal.         Behavior: Behavior normal.         Vital Signs  ED Triage Vitals [09/22/23 1253]   Temperature Pulse Respirations Blood Pressure SpO2   98.2 °F (36.8 °C) 100 18 138/74 100 %      Temp Source Heart Rate Source Patient Position - Orthostatic VS BP Location FiO2 (%)   Oral Monitor Lying Left arm --      Pain Score       2           Vitals:    09/22/23 1253   BP: 138/74   Pulse: 100   Patient Position - Orthostatic VS: Lying         Visual Acuity      ED Medications  Medications   ondansetron (ZOFRAN) injection 4 mg (4 mg Intravenous Given 9/22/23 1302)   sodium chloride 0.9 % bolus 1,000 mL (0 mL Intravenous Stopped 9/22/23 1413)   Famotidine (PF) (PEPCID) injection 20 mg (20 mg Intravenous Given 9/22/23 1313)   haloperidol lactate (HALDOL) injection 5 mg (5 mg Intramuscular Given 9/22/23 1328)   ketorolac (TORADOL) injection 15 mg (15 mg Intravenous Given 9/22/23 1410)   potassium chloride (K-DUR,KLOR-CON) CR tablet 40 mEq (40 mEq Oral Given 9/22/23 1409)       Diagnostic Studies  Results Reviewed     Procedure Component Value Units Date/Time    Comprehensive metabolic panel [[id number]]  (Abnormal) Collected: 09/22/23 1304    Lab Status: Final result Specimen: Blood from Arm, Left Updated: 09/22/23 1329     Sodium 141 mmol/L      Potassium 3.0 mmol/L      Chloride 107 mmol/L      CO2 21 mmol/L      ANION GAP 13 mmol/L      BUN 12 mg/dL      Creatinine 0.89 mg/dL      Glucose 123 mg/dL      Calcium 9.7 mg/dL      AST 19 U/L      ALT 30 U/L      Alkaline Phosphatase 59 U/L      Total Protein 7.9 g/dL      Albumin 4.7 g/dL      Total Bilirubin 1.06 mg/dL      eGFR 121 ml/min/1.73sq m     Narrative:      [place] guidelines for Chronic Kidney Disease (CKD):   •  Stage 1 with normal or high GFR (GFR > 90 mL/min/1.73 square meters)  •  Stage 2 Mild CKD (GFR = 60-89 mL/min/1.73 square meters)  •  Stage 3A Moderate CKD (GFR = 45-59 mL/min/1.73 square meters)  •  Stage 3B Moderate CKD (GFR = 30-44 mL/min/1.73 square meters)  •  Stage 4 Severe CKD (GFR = 15-29 mL/min/1.73 square meters)  •  Stage 5 End Stage CKD (GFR <15 mL/min/1.73 square meters)  Note: GFR calculation is accurate only with a steady state creatinine    Lipase [[id number]]  (Abnormal) Collected: 09/22/23 1304    Lab Status: Final result Specimen: Blood from Arm, Left Updated: 09/22/23 1329     Lipase 6 u/L     CBC and differential [[id number]]  (Abnormal) Collected: 09/22/23 1304    Lab Status: Final result Specimen: Blood from Arm, Left Updated: 09/22/23 1312     WBC 6.50 Thousand/uL      RBC 5.41 Million/uL Hemoglobin 16.0 g/dL      Hematocrit 46.9 %      MCV 87 fL      MCH 29.6 pg      MCHC 34.1 g/dL      RDW 12.4 %      MPV 10.4 fL      Platelets 192 Thousands/uL      nRBC 0 /100 WBCs      Neutrophils Relative 83 %      Immat GRANS % 1 %      Lymphocytes Relative 12 %      Monocytes Relative 4 %      Eosinophils Relative 0 %      Basophils Relative 0 %      Neutrophils Absolute 5.41 Thousands/µL      Immature Grans Absolute 0.04 Thousand/uL      Lymphocytes Absolute 0.80 Thousands/µL      Monocytes Absolute 0.23 Thousand/µL      Eosinophils Absolute 0.00 Thousand/µL      Basophils Absolute 0.02 Thousands/µL                  No orders to display              Procedures  Procedures         ED Course  ED Course as of 09/22/23 1500   Fri Sep 22, 2023   1319 Patient continues to actively vomit. Has failed Zofran therapy at home. Patient is a current marijuana smoker with last episode of marijuana use yesterday evening. Will trial Haldol for tensional cannabinoid hyperemesis syndrome. Continue to monitor. 1409 Patient reports feeling slightly better. Will continue to monitor. 1435 Patient reports feeling better. He tolerated a p.o. challenge with oral potassium. Will discharge. Patient already has Zofran at home. SBIRT 22yo+    Flowsheet Row Most Recent Value   Initial Alcohol Screen: US AUDIT-C     1. How often do you have a drink containing alcohol? 0 Filed at: 09/22/2023 1252   2. How many drinks containing alcohol do you have on a typical day you are drinking? 0 Filed at: 09/22/2023 1252   3a. Male UNDER 65: How often do you have five or more drinks on one occasion? 0 Filed at: 09/22/2023 1252   3b. FEMALE Any Age, or MALE 65+: How often do you have 4 or more drinks on one occassion? 0 Filed at: 09/22/2023 1252   Audit-C Score 0 Filed at: 09/22/2023 1252   SHAISTA: How many times in the past year have you. ..     Used an illegal drug or used a prescription medication for non-medical reasons? Daily or Almost Daily Filed at: 09/22/2023 1252   DAST-10: In the past 12 months. ..    1. Have you used drugs other than those required for medical reasons? 1 Filed at: 09/22/2023 1252   2. Do you use more than one drug at a time? 0 Filed at: 09/22/2023 1252   3. Have you had medical problems as a result of your drug use (e.g., memory loss, hepatitis, convulsions, bleeding, etc.)? 0 Filed at: 09/22/2023 1252   4. Have you had "blackouts" or "flashbacks" as a result of drug use? YesNo 0 Filed at: 09/22/2023 1252   5. Do you ever feel bad or guilty about your drug use? 0 Filed at: 09/22/2023 1252   6. Does your spouse (or parent) ever complain about your involvement with drugs? 0 Filed at: 09/22/2023 1252   7. Have you neglected your family because of your use of drugs? 0 Filed at: 09/22/2023 1252   8. Have you engaged in illegal activities in order to obtain drugs? 0 Filed at: 09/22/2023 1252   9. Have you ever experienced withdrawal symptoms (felt sick) when you stopped taking drugs? 0 Filed at: 09/22/2023 1252   10. Are you always able to stop using drugs when you want to? 0 Filed at: 09/22/2023 1252   DAST-10 Score 1 Filed at: 09/22/2023 1252                    Medical Decision Making  This is a 21-year-old male presenting to the emergency department today for nausea and vomiting. Was evaluated in the emergency department 2 days ago. I reviewed this note. He is a daily marijuana user. Vital signs are stable. On physical examination, the patient's abdomen is soft, nontender, nondistended, and without organomegaly. CBC is reassuring. Hypokalemia was repleted while here in the emergency department with oral Klor-Con. Lipase reassuring. Patient was given intravenous fluids in addition to Zofran. Zofran did not help the patient's nausea and he continued to retch while here in the emergency department.   The patient was dosed with Pepcid, Toradol, and Haldol while here in the emergency department. The patient notes he is no longer nauseous after this and pain is improved. I did offer the patient a CT abdomen and pelvis but he would for not to have this done at this time. The patient is stable for discharge at this time. He already has antiemetics at home that he can use for his symptoms. Given that Haldol helped the patient symptoms, it is likely that the patient has semblance of cannabinoid hyperemesis syndrome and therefore I instructed him to abstain from use of marijuana. Strict return precautions were given. Recommend PCP follow-up as soon as possible. The patient and/or patient's proxy verify their understanding and agree to the plan at this time. All questions answered to the patient and/or their proxy's satisfaction. All labs reviewed and utilized in the medical decision making process (if labs were ordered). Portions of the record may have been created with voice recognition software.  Occasional wrong word or "sound a like" substitutions may have occurred due to the inherent limitations of voice recognition software.  Read the chart carefully and recognize, using context, where substitutions have occurred. Hypokalemia: undiagnosed new problem with uncertain prognosis  Nausea and vomiting, unspecified vomiting type: undiagnosed new problem with uncertain prognosis  Amount and/or Complexity of Data Reviewed  External Data Reviewed: notes. Labs: ordered. Decision-making details documented in ED Course. Risk  Prescription drug management.           Disposition  Final diagnoses:   Nausea and vomiting, unspecified vomiting type   Hypokalemia     Time reflects when diagnosis was documented in both MDM as applicable and the Disposition within this note     Time User Action Codes Description Comment    9/22/2023  2:35 PM Quincy Anaya Add [R11.2] Nausea and vomiting, unspecified vomiting type     9/22/2023  2:35 PM Jaclyn [facility] [address] [E87.6] Hypokalemia       ED Disposition     ED Disposition   Discharge    Condition   Stable    Date/Time   Fri Sep 22, 2023 [address] Jimenez discharge to home/self care. Follow-up Information     Follow up With Specialties Details Why Contact Info Additional Information    [address] [facility],  Family Medicine Schedule an appointment as soon as possible for a visit   [address] [phone]  [facility] Emergency Department Emergency Medicine Go to  If symptoms worsen [address] [address]  [address] [facility] Emergency Department, [address] [place], [place], [address]          Discharge Medication List as of 9/22/2023  2:35 PM      CONTINUE these medications which have NOT CHANGED    Details   mirtazapine (REMERON) 30 mg tablet Take 1 tablet (30 mg total) by mouth every evening, Starting Tue 4/18/2023, No Print      ondansetron (Zofran ODT) 4 mg disintegrating tablet Take 1 tablet (4 mg total) by mouth every 6 (six) hours as needed for nausea or vomiting, Starting Wed 9/20/2023, Normal      !! busPIRone (BUSPAR) 15 mg tablet Take 15 mg by mouth 2 (two) times a day, Starting Mon 7/10/2023, Historical Med      !! busPIRone (BUSPAR) 5 mg tablet Take 2 tablets (10 mg total) by mouth 2 (two) times a day, Starting Fri 4/28/2023, No Print      fluticasone (FLONASE) 50 mcg/act nasal spray 2 sprays into each nostril daily, Starting Wed 5/17/2023, Normal      hydrOXYzine pamoate (VISTARIL) 50 mg capsule take 1 capsule by mouth twice a day if needed for anxiety, Historical Med      omeprazole (PriLOSEC) 20 mg delayed release capsule Take 1 capsule (20 mg total) by mouth daily before breakfast, Starting Tue 1/24/2023, Normal      !! sertraline (ZOLOFT) 100 mg tablet Take 1 tablet (100 mg total) by mouth daily, Starting Fri 5/5/2023, Normal      !! sertraline (Zoloft) 50 mg tablet Take 1 tablet (50 mg total) by mouth daily, Starting Thu 3/16/2023, Normal      sucralfate (CARAFATE) 1 g tablet Take 1 tablet (1 g total) by mouth 4 (four) times a day, Starting Wed 9/20/2023, Normal       !! - Potential duplicate medications found. Please discuss with provider. No discharge procedures on file.     PDMP Review       Value Time User    PDMP Reviewed  Yes 4/11/2023  1:43 PM [address] [place] [address],           ED Provider  Electronically Signed by           Terri Whaley PA-C  09/22/23 [id number]

## 2023-09-23 ENCOUNTER — HOSPITAL ENCOUNTER (EMERGENCY)
Facility: HOSPITAL | Age: 23
Discharge: HOME | End: 2023-09-23
Attending: EMERGENCY MEDICINE | Admitting: EMERGENCY MEDICINE
Payer: COMMERCIAL

## 2023-09-23 VITALS
BODY MASS INDEX: 21.75 KG/M2 | HEART RATE: 85 BPM | SYSTOLIC BLOOD PRESSURE: 134 MMHG | RESPIRATION RATE: 20 BRPM | TEMPERATURE: 97.8 F | OXYGEN SATURATION: 97 % | DIASTOLIC BLOOD PRESSURE: 85 MMHG | WEIGHT: 147.27 LBS

## 2023-09-23 DIAGNOSIS — F41.9 ANXIETY: Primary | ICD-10-CM

## 2023-09-23 PROCEDURE — 96372 THER/PROPH/DIAG INJ SC/IM: CPT

## 2023-09-23 PROCEDURE — 99284 EMERGENCY DEPT VISIT MOD MDM: CPT

## 2023-09-23 RX ORDER — HALOPERIDOL 5 MG/ML
2 INJECTION INTRAMUSCULAR ONCE
Status: COMPLETED | OUTPATIENT
Start: 2023-09-23 | End: 2023-09-23

## 2023-09-23 RX ORDER — HYDROXYZINE HYDROCHLORIDE 25 MG/1
25 TABLET, FILM COATED ORAL ONCE
Status: COMPLETED | OUTPATIENT
Start: 2023-09-23 | End: 2023-09-23

## 2023-09-23 RX ADMIN — HYDROXYZINE HYDROCHLORIDE 25 MG: 25 TABLET, FILM COATED ORAL at 05:37

## 2023-09-23 RX ADMIN — HALOPERIDOL LACTATE 2 MG: 5 INJECTION, SOLUTION INTRAMUSCULAR at 06:05

## 2023-09-23 NOTE — DISCHARGE INSTRUCTIONS
Please do follow-up with your family doctor to discuss long-term treatment of anxiety. There are many treatment options available to try. Please do seek out help if you begin experiencing thoughts of harming self or others. There are many mental health resources in the area to utilize.

## 2023-09-23 NOTE — ED PROVIDER NOTES
History  Chief Complaint   Patient presents with   • Medical Problem     Pt arriving via ems, pt reports having a panic attack yesterday at 4 pm where he then fainted and fell and hit his head against the door. Since then pt states he is in a "brain fog and can not get out of it" and still feels anxious. Reports HA 4/10 no dizziness     69-year-old male history of anxiety presents to ED for anxiety. Patient states yesterday around 4 PM he was experiencing an anxiety attack where he fell and hit his head against a door. Since then he has been feeling uneasy. He reports being unable to sleep. He is still very anxious. He describes it as constantly being in fight or flight mode. Denies thoughts of harming self or others. He tried to take an Atarax earlier in the evening without success of obtaining sleep. Patient also smoked marijuana to try seeing if it would help him sleep which it did not. Patient has a medical marijuana card. Denies experiencing any bleeding from ears, nose, mouth after fall. Denies amnesia of the events leading up to and after the fall. Denies vomiting after the fall. Denies use of blood thinners. Patient mentions headache to triage upon arrival to ED. Denies headache during evaluation. Denies dizziness, nausea, vomiting, abdominal pain, shortness of breath, chest pain, syncope, seizure, fever, chills, pain with urination, blood in urine, blood in stool, constipation, diarrhea. Patient is prescribed BuSpar, Zoloft, Remeron for anxiety and major depression. Patient states that about a month ago he stopped taking the BuSpar and Zoloft regularly. Since then he has had increased anxiety symptoms. He currently has an appointment scheduled to see his family doctor on October 2, 2023. His family doctor is the provider who prescribes him with anxiety medication. Patient plans on discussing his recent symptoms with his family doctor at his next appointment.     He has been seen in the ED twice in the past couple days for vomiting. His vomiting symptoms have resolved however now he is experiencing increased anxiety. Prior to Admission Medications   Prescriptions Last Dose Informant Patient Reported? Taking?   busPIRone (BUSPAR) 15 mg tablet   Yes No   Sig: Take 15 mg by mouth 2 (two) times a day   Patient not taking: Reported on 2023   busPIRone (BUSPAR) 5 mg tablet   No No   Sig: Take 2 tablets (10 mg total) by mouth 2 (two) times a day   Patient not taking: Reported on 2023   fluticasone (FLONASE) 50 mcg/act nasal spray   No No   Si sprays into each nostril daily   Patient not taking: Reported on 2023   hydrOXYzine pamoate (VISTARIL) 50 mg capsule  Self Yes No   Sig: take 1 capsule by mouth twice a day if needed for anxiety   Patient not taking: Reported on 2023   mirtazapine (REMERON) 30 mg tablet   No No   Sig: Take 1 tablet (30 mg total) by mouth every evening   omeprazole (PriLOSEC) 20 mg delayed release capsule  Self No No   Sig: Take 1 capsule (20 mg total) by mouth daily before breakfast   Patient not taking: Reported on 2023   ondansetron (Zofran ODT) 4 mg disintegrating tablet   No No   Sig: Take 1 tablet (4 mg total) by mouth every 6 (six) hours as needed for nausea or vomiting   sertraline (ZOLOFT) 100 mg tablet   No No   Sig: Take 1 tablet (100 mg total) by mouth daily   Patient not taking: Reported on 2023   sertraline (Zoloft) 50 mg tablet  Self No No   Sig: Take 1 tablet (50 mg total) by mouth daily   Patient not taking: Reported on 2023   sucralfate (CARAFATE) 1 g tablet   No No   Sig: Take 1 tablet (1 g total) by mouth 4 (four) times a day   Patient not taking: Reported on 2023      Facility-Administered Medications: None       Past Medical History:   Diagnosis Date   • Anxiety    • GERD (gastroesophageal reflux disease)        History reviewed. No pertinent surgical history.     Family History   Problem Relation Age of Onset   • Drug abuse Maternal Aunt    • Alcohol abuse Maternal Aunt    • Anxiety disorder Paternal Grandmother    • Completed Suicide  Cousin      I have reviewed and agree with the history as documented. E-Cigarette/Vaping   • E-Cigarette Use Never User      E-Cigarette/Vaping Substances   • Nicotine No    • THC No    • CBD No    • Flavoring No    • Other No    • Unknown No      Social History     Tobacco Use   • Smoking status: Never   • Smokeless tobacco: Never   Vaping Use   • Vaping Use: Never used   Substance Use Topics   • Alcohol use: Not Currently   • Drug use: Not Currently     Types: Marijuana     Comment: last time a month ago       Review of Systems   Constitutional: Negative for chills, diaphoresis, fatigue, fever and unexpected weight change. HENT: Negative for ear pain and sore throat. Eyes: Negative for pain and visual disturbance. Respiratory: Negative for cough, shortness of breath, wheezing and stridor. Cardiovascular: Negative for chest pain and palpitations. Gastrointestinal: Negative for abdominal pain and vomiting. Genitourinary: Negative for dysuria and hematuria. Musculoskeletal: Negative for arthralgias and back pain. Skin: Negative for color change and rash. Neurological: Negative for seizures and syncope. Psychiatric/Behavioral: Negative for suicidal ideas. The patient is nervous/anxious. All other systems reviewed and are negative. Physical Exam  Physical Exam  Vitals and nursing note reviewed. Constitutional:       General: He is not in acute distress. Appearance: Normal appearance. He is well-developed. He is not ill-appearing, toxic-appearing or diaphoretic. HENT:      Head: Normocephalic and atraumatic. No raccoon eyes, Ambriz's sign, abrasion, contusion, masses, right periorbital erythema, left periorbital erythema or laceration. Jaw: There is normal jaw occlusion.    Eyes:      Conjunctiva/sclera: Conjunctivae normal.   Cardiovascular: Rate and Rhythm: Normal rate and regular rhythm. Pulses: Normal pulses. Heart sounds: Normal heart sounds. No murmur heard. No friction rub. No gallop. Pulmonary:      Effort: Pulmonary effort is normal. No respiratory distress. Breath sounds: Normal breath sounds. No stridor. No wheezing, rhonchi or rales. Abdominal:      Palpations: Abdomen is soft. Tenderness: There is no abdominal tenderness. Musculoskeletal:         General: No swelling. Cervical back: Neck supple. Skin:     General: Skin is warm and dry. Capillary Refill: Capillary refill takes less than 2 seconds. Neurological:      General: No focal deficit present. Mental Status: He is alert and oriented to person, place, and time. Mental status is at baseline. Cranial Nerves: Cranial nerves 2-12 are intact. Sensory: Sensation is intact. Motor: Motor function is intact. Coordination: Coordination is intact. Gait: Gait is intact. Psychiatric:         Attention and Perception: Attention and perception normal.         Mood and Affect: Mood normal.         Speech: Speech normal.         Behavior: Behavior normal. Behavior is cooperative. Thought Content: Thought content normal. Thought content does not include homicidal or suicidal ideation. Thought content does not include homicidal or suicidal plan.          Cognition and Memory: Cognition normal.         Judgment: Judgment normal.         Vital Signs  ED Triage Vitals   Temperature Pulse Respirations Blood Pressure SpO2   09/23/23 0515 09/23/23 0509 09/23/23 0509 09/23/23 0509 09/23/23 0509   97.8 °F (36.6 °C) 85 20 134/85 97 %      Temp Source Heart Rate Source Patient Position - Orthostatic VS BP Location FiO2 (%)   09/23/23 0515 09/23/23 0509 09/23/23 0509 09/23/23 0509 --   Oral Monitor Lying Right arm       Pain Score       09/23/23 0509       4           Vitals:    09/23/23 0509   BP: 134/85   Pulse: 85   Patient Position - Orthostatic VS: Lying         Visual Acuity      ED Medications  Medications   hydrOXYzine HCL (ATARAX) tablet 25 mg (25 mg Oral Given 9/23/23 0537)   haloperidol lactate (HALDOL) injection 2 mg (2 mg Intramuscular Given 9/23/23 0605)       Diagnostic Studies  Results Reviewed     None                 No orders to display              Procedures  Procedures         ED Course                               SBIRT 20yo+    Flowsheet Row Most Recent Value   Initial Alcohol Screen: US AUDIT-C     1. How often do you have a drink containing alcohol? 0 Filed at: 09/23/2023 0509   2. How many drinks containing alcohol do you have on a typical day you are drinking? 0 Filed at: 09/23/2023 0509   3a. Male UNDER 65: How often do you have five or more drinks on one occasion? 0 Filed at: 09/23/2023 0509   3b. FEMALE Any Age, or MALE 65+: How often do you have 4 or more drinks on one occassion? 0 Filed at: 09/23/2023 0509   Audit-C Score 0 Filed at: 09/23/2023 1687   SHAISTA: How many times in the past year have you. .. Used an illegal drug or used a prescription medication for non-medical reasons? Never Filed at: 09/23/2023 3590                    Medical Decision Making  28-year-old male presents ED for evaluation of anxiety. He reports that yesterday around 4 PM he was experiencing an anxiety attack during an anxiety attack he fell causing him to hit his head. Since then he has been in a heightened state of anxiety, "fight or flight mode". On physical examination he is normotensive, nontachycardic, afebrile, without respiratory distress. No evidence of significant head trauma. He is answering questions appropriately. Denies thoughts of harming self or others. Patient requesting to receive medicine to help calm him so he can obtain some sleep. Head atraumatic. No rosales sign, raccoon eyes. No hemotympanum. Patient did not vomit after hitting head. No dizziness.   No amnesia of events before and after the fall.  Based on [facility] head CT rules, would not be beneficial to perform head CT. Will provide patient with Atarax dose. Return to patient to reassess after Atarax dose. Patient stating that he would like to go home. He does not feel the Atarax provided him with any benefit. I explained that we can provide him with a small dose of Haldol. We want to avoid giving out benzodiazepines when possible as they carry addictive properties and have undesirable side effect profile. Patient agreeable to Haldol. Provided patient with 2 mg IM dose of Haldol. After Haldol dose, patient would like to leave. Patient stable for discharge. He has no homicidal or suicidal ideation. He appears to be of sound judgment to make his own decisions. There are no grounds to hold patient. Patient will see his primary care provider on October 2, 2023 to further discuss long-term anxiety treatment. Encouraged patient to attend this follow-up and explore possible options. Patient agrees. Patient discharged. At time of discharge she is afebrile, normotensive, nontachycardic, without respiratory distress. Watched patient walk out of ED without any difficulty. Prior to discharge, discharge instructions were discussed with patient at bedside. Patient was provided both verbal and written instructions. Patient is understanding of the discharge instructions and is agreeable to plan of care. Return precautions were discussed with patient bedside, patient verbalized understanding of signs and symptoms that would necessitate return to the ED. All questions were answered. Patient was comfortable with the plan of care and discharged to home. Risk  Prescription drug management.           Disposition  Final diagnoses:   Anxiety     Time reflects when diagnosis was documented in both MDM as applicable and the Disposition within this note     Time User Action Codes Description Comment    9/23/2023  6:11 AM Emil Engel Add [F41.9] Anxiety       ED Disposition     ED Disposition   Discharge    Condition   Stable    Date/Time   Sat Sep 23, 2023  6:11 AM    Comment   Darcy Cools discharge to home/self care. Follow-up Information     Follow up With Specialties Details Why Contact Info    [facility] [address]. [place] [address]  [phone]            Discharge Medication List as of 9/23/2023  6:13 AM      CONTINUE these medications which have NOT CHANGED    Details   !! busPIRone (BUSPAR) 15 mg tablet Take 15 mg by mouth 2 (two) times a day, Starting Mon 7/10/2023, Historical Med      !! busPIRone (BUSPAR) 5 mg tablet Take 2 tablets (10 mg total) by mouth 2 (two) times a day, Starting Fri 4/28/2023, No Print      fluticasone (FLONASE) 50 mcg/act nasal spray 2 sprays into each nostril daily, Starting Wed 5/17/2023, Normal      hydrOXYzine pamoate (VISTARIL) 50 mg capsule take 1 capsule by mouth twice a day if needed for anxiety, Historical Med      mirtazapine (REMERON) 30 mg tablet Take 1 tablet (30 mg total) by mouth every evening, Starting Tue 4/18/2023, No Print      omeprazole (PriLOSEC) 20 mg delayed release capsule Take 1 capsule (20 mg total) by mouth daily before breakfast, Starting Tue 1/24/2023, Normal      ondansetron (Zofran ODT) 4 mg disintegrating tablet Take 1 tablet (4 mg total) by mouth every 6 (six) hours as needed for nausea or vomiting, Starting Wed 9/20/2023, Normal      !! sertraline (ZOLOFT) 100 mg tablet Take 1 tablet (100 mg total) by mouth daily, Starting Fri 5/5/2023, Normal      !! sertraline (Zoloft) 50 mg tablet Take 1 tablet (50 mg total) by mouth daily, Starting Thu 3/16/2023, Normal      sucralfate (CARAFATE) 1 g tablet Take 1 tablet (1 g total) by mouth 4 (four) times a day, Starting Wed 9/20/2023, Normal       !! - Potential duplicate medications found. Please discuss with provider. No discharge procedures on file.     PDMP Review Value Time User    PDMP Reviewed  Yes 4/11/2023  1:43  [facility],           ED Provider  Electronically Signed by           Caio Sun PA-C  09/23/23 9760

## 2023-09-24 ENCOUNTER — HOSPITAL ENCOUNTER (EMERGENCY)
Facility: HOSPITAL | Age: 23
Discharge: HOME | End: 2023-09-24
Attending: EMERGENCY MEDICINE | Admitting: EMERGENCY MEDICINE
Payer: COMMERCIAL

## 2023-09-24 VITALS
RESPIRATION RATE: 16 BRPM | DIASTOLIC BLOOD PRESSURE: 83 MMHG | HEART RATE: 87 BPM | OXYGEN SATURATION: 97 % | SYSTOLIC BLOOD PRESSURE: 127 MMHG | TEMPERATURE: 98.8 F

## 2023-09-24 DIAGNOSIS — F41.9 ANXIETY: Primary | ICD-10-CM

## 2023-09-24 LAB
ATRIAL RATE: 82 BPM
P AXIS: 61 DEGREES
PR INTERVAL: 144 MS
QRS AXIS: 81 DEGREES
QRSD INTERVAL: 84 MS
QT INTERVAL: 362 MS
QTC INTERVAL: 422 MS
T WAVE AXIS: 69 DEGREES
VENTRICULAR RATE: 82 BPM

## 2023-09-24 PROCEDURE — 99283 EMERGENCY DEPT VISIT LOW MDM: CPT

## 2023-09-24 PROCEDURE — 99284 EMERGENCY DEPT VISIT MOD MDM: CPT | Performed by: EMERGENCY MEDICINE

## 2023-09-24 PROCEDURE — 93005 ELECTROCARDIOGRAM TRACING: CPT

## 2023-09-24 PROCEDURE — 93010 ELECTROCARDIOGRAM REPORT: CPT | Performed by: INTERNAL MEDICINE

## 2023-09-24 RX ORDER — LORAZEPAM 1 MG/1
1 TABLET ORAL ONCE AS NEEDED
Qty: 5 TABLET | Refills: 0 | Status: SHIPPED | OUTPATIENT
Start: 2023-09-24

## 2023-09-24 RX ORDER — LORAZEPAM 1 MG/1
1 TABLET ORAL ONCE
Status: COMPLETED | OUTPATIENT
Start: 2023-09-24 | End: 2023-09-24

## 2023-09-24 RX ADMIN — LORAZEPAM 1 MG: 1 TABLET ORAL at 05:11

## 2023-09-24 NOTE — ED PROVIDER NOTES
History  Chief Complaint   Patient presents with   • Anxiety     Pt reports panic attack since yesterday. Pt took atarax 45 min prior to arrival.  Pt not take home medications because, "I can't get on a schedule."       History provided by:  Patient   used: No    Anxiety  Presenting symptoms: no agitation, no homicidal ideas and no suicidal thoughts    Presenting symptoms comment:  Anxiety  Patient accompanied by:  Parent  Degree of incapacity (severity): Moderate  Onset quality:  Gradual  Duration:  2 days  Timing:  Constant  Progression:  Waxing and waning  Chronicity:  New  Context comment:  Feeling anxious for past 2 days, not on meds for 1 month, was seen in ER yesterday for same, medicated and discharged, has Therapist salvador on Monday  Treatment compliance:  Untreated  Time since last psychoactive medication taken:  1 month  Relieved by:  Nothing  Worsened by:  Nothing  Ineffective treatments:  None tried  Associated symptoms: anxiety    Associated symptoms: no abdominal pain, no chest pain and no headaches    Risk factors: hx of mental illness        Prior to Admission Medications   Prescriptions Last Dose Informant Patient Reported?  Taking?   busPIRone (BUSPAR) 15 mg tablet   Yes No   Sig: Take 15 mg by mouth 2 (two) times a day   Patient not taking: Reported on 2023   busPIRone (BUSPAR) 5 mg tablet   No No   Sig: Take 2 tablets (10 mg total) by mouth 2 (two) times a day   Patient not taking: Reported on 2023   fluticasone (FLONASE) 50 mcg/act nasal spray   No No   Si sprays into each nostril daily   Patient not taking: Reported on 2023   hydrOXYzine pamoate (VISTARIL) 50 mg capsule  Self Yes No   Sig: take 1 capsule by mouth twice a day if needed for anxiety   Patient not taking: Reported on 2023   mirtazapine (REMERON) 30 mg tablet   No No   Sig: Take 1 tablet (30 mg total) by mouth every evening   omeprazole (PriLOSEC) 20 mg delayed release capsule  Self No No Sig: Take 1 capsule (20 mg total) by mouth daily before breakfast   Patient not taking: Reported on 9/20/2023   ondansetron (Zofran ODT) 4 mg disintegrating tablet   No No   Sig: Take 1 tablet (4 mg total) by mouth every 6 (six) hours as needed for nausea or vomiting   sertraline (ZOLOFT) 100 mg tablet   No No   Sig: Take 1 tablet (100 mg total) by mouth daily   Patient not taking: Reported on 9/20/2023   sertraline (Zoloft) 50 mg tablet  Self No No   Sig: Take 1 tablet (50 mg total) by mouth daily   Patient not taking: Reported on 9/20/2023   sucralfate (CARAFATE) 1 g tablet   No No   Sig: Take 1 tablet (1 g total) by mouth 4 (four) times a day   Patient not taking: Reported on 9/22/2023      Facility-Administered Medications: None       Past Medical History:   Diagnosis Date   • Anxiety    • GERD (gastroesophageal reflux disease)        History reviewed. No pertinent surgical history. Family History   Problem Relation Age of Onset   • Drug abuse Maternal Aunt    • Alcohol abuse Maternal Aunt    • Anxiety disorder Paternal Grandmother    • Completed Suicide  Cousin      I have reviewed and agree with the history as documented. E-Cigarette/Vaping   • E-Cigarette Use Never User      E-Cigarette/Vaping Substances   • Nicotine No    • THC No    • CBD No    • Flavoring No    • Other No    • Unknown No      Social History     Tobacco Use   • Smoking status: Never   • Smokeless tobacco: Never   Vaping Use   • Vaping Use: Never used   Substance Use Topics   • Alcohol use: Not Currently   • Drug use: Not Currently     Types: Marijuana     Comment: last time a month ago       Review of Systems   Constitutional: Negative for chills and fever. HENT: Negative for facial swelling, sore throat and trouble swallowing. Eyes: Negative for pain and visual disturbance. Respiratory: Negative for cough and shortness of breath. Cardiovascular: Negative for chest pain and leg swelling.    Gastrointestinal: Negative for abdominal pain, blood in stool, diarrhea, nausea and vomiting. Genitourinary: Negative for dysuria and flank pain. Musculoskeletal: Negative for back pain, neck pain and neck stiffness. Skin: Negative for pallor and rash. Allergic/Immunologic: Negative for environmental allergies and immunocompromised state. Neurological: Negative for dizziness and headaches. Hematological: Negative for adenopathy. Does not bruise/bleed easily. Psychiatric/Behavioral: Negative for agitation, behavioral problems, homicidal ideas and suicidal ideas. The patient is nervous/anxious. All other systems reviewed and are negative. Physical Exam  Physical Exam  Vitals and nursing note reviewed. Constitutional:       General: He is not in acute distress. Appearance: He is well-developed. HENT:      Head: Normocephalic and atraumatic. Eyes:      Extraocular Movements: Extraocular movements intact. Cardiovascular:      Rate and Rhythm: Normal rate and regular rhythm. Pulmonary:      Effort: Pulmonary effort is normal. No respiratory distress. Abdominal:      Palpations: Abdomen is soft. Tenderness: There is no abdominal tenderness. There is no guarding or rebound. Musculoskeletal:         General: Normal range of motion. Cervical back: Normal range of motion and neck supple. Skin:     General: Skin is warm and dry. Neurological:      General: No focal deficit present. Mental Status: He is alert and oriented to person, place, and time. Psychiatric:         Mood and Affect: Mood is anxious. Behavior: Behavior normal. Behavior is cooperative. Thought Content: Thought content does not include homicidal or suicidal ideation.          Vital Signs  ED Triage Vitals   Temperature Pulse Respirations Blood Pressure SpO2   09/24/23 0406 09/24/23 0407 09/24/23 0407 09/24/23 0407 09/24/23 0407   98.8 °F (37.1 °C) 92 20 143/91 99 %      Temp Source Heart Rate Source Patient Position - Orthostatic VS BP Location FiO2 (%)   09/24/23 0406 -- -- -- --   Oral          Pain Score       --                  Vitals:    09/24/23 0407 09/24/23 0500   BP: 143/91 125/82   Pulse: 92 86         Visual Acuity      ED Medications  Medications   LORazepam (ATIVAN) tablet 1 mg (1 mg Oral Given 9/24/23 0511)       Diagnostic Studies  Results Reviewed     None                 No orders to display              Procedures  Procedures         ED Course                               SBIRT 22yo+    Flowsheet Row Most Recent Value   Initial Alcohol Screen: US AUDIT-C     1. How often do you have a drink containing alcohol? 0 Filed at: 09/24/2023 0412   2. How many drinks containing alcohol do you have on a typical day you are drinking? 0 Filed at: 09/24/2023 0412   3a. Male UNDER 65: How often do you have five or more drinks on one occasion? 0 Filed at: 09/24/2023 0412   Audit-C Score 0 Filed at: 09/24/2023 5485   SHAISTA: How many times in the past year have you. .. Used an illegal drug or used a prescription medication for non-medical reasons? Never Filed at: 09/24/2023 3120                    Medical Decision Making  Patient is a 81-year-old male, presents with mother, history of anxiety, depression, comes in with complaints of recurrent anxious symptoms, going on since yesterday, patient states that it started suddenly, patient was seen in the ED, was medicated, felt better, has therapist appointment on Monday, denies suicidal or homicidal ideation. Patient has not been taking his meds for past 1 month. Does use marijuana, denies any other drug use. On exam, patient is conscious, alert, vital signs stable, no acute distress, pupils equal round reactive to light, normal neuro exam.  Impression: Persistent recurrent anxiety, we will give Ativan, few pills to until patient sees his therapist on Monday.   Patient was informed and shows understanding about Ativan with possibility of dependence, need to have close follow-up with therapist. Patient and Mother ok with plan. Risk  Prescription drug management. Disposition  Final diagnoses:   Anxiety     Time reflects when diagnosis was documented in both MDM as applicable and the Disposition within this note     Time User Action Codes Description Comment    9/24/2023  5:31 AM Ashley Myers Add [F41.9] Anxiety       ED Disposition     ED Disposition   Discharge    Condition   Stable    Date/Time   Sun Sep 24, 2023  5:31 AM    Comment   Cristhian Mane discharge to home/self care. Follow-up Information     Follow up With Specialties Details Why Sanjeev Fleming DO Family Medicine Schedule an appointment as soon as possible for a visit   [address] [place] [id number] [phone]            Patient's Medications   Discharge Prescriptions    LORAZEPAM (ATIVAN) 1 MG TABLET    Take 1 tablet (1 mg total) by mouth once as needed for anxiety for up to 5 doses       Start Date: 9/24/2023 End Date: --       Order Dose: 1 mg       Quantity: 5 tablet    Refills: 0       No discharge procedures on file.     PDMP Review       Value Time User    PDMP Reviewed  Yes 4/11/2023  1:43 PM [address] [place] [address]          ED Provider  Electronically Signed by           Ashley Myers MD  09/24/23 3134

## 2023-09-26 ENCOUNTER — OFFICE VISIT (OUTPATIENT)
Dept: URGENT CARE | Age: 23
End: 2023-09-26
Payer: COMMERCIAL

## 2023-09-26 ENCOUNTER — APPOINTMENT (OUTPATIENT)
Dept: RADIOLOGY | Age: 23
End: 2023-09-26
Payer: COMMERCIAL

## 2023-09-26 VITALS
RESPIRATION RATE: 18 BRPM | DIASTOLIC BLOOD PRESSURE: 94 MMHG | OXYGEN SATURATION: 97 % | HEART RATE: 78 BPM | TEMPERATURE: 98.5 F | SYSTOLIC BLOOD PRESSURE: 135 MMHG
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
## 2023-09-26 DIAGNOSIS — S69.91XA INJURY OF RIGHT HAND, INITIAL ENCOUNTER: Primary | ICD-10-CM

## 2023-09-26 PROCEDURE — 99213 OFFICE O/P EST LOW 20 MIN: CPT | Performed by: EMERGENCY MEDICINE

## 2023-09-26 PROCEDURE — 73130 X-RAY EXAM OF HAND: CPT

## 2023-09-26 RX ORDER — NAPROXEN 500 MG/1
500 TABLET ORAL 2 TIMES DAILY WITH MEALS
Qty: 28 TABLET | Refills: 0 | Status: SHIPPED | OUTPATIENT
Start: 2023-09-26 | End: 2023-10-10

## 2023-09-26 NOTE — PROGRESS NOTES
[facility]  Orthopedic injury treatment    Date/Time: 2023 4:30 PM    Performed by: Shannon Ambriz DO  Authorized by: Shannon Ambriz DO    Patient Location:  [facility] Protocol:  Procedure performed by:  Consent: Verbal consent obtained. Consent given by: patient  Time out: Immediately prior to procedure a "time out" was called to verify the correct patient, procedure, equipment, support staff and site/side marked as required. Timeout called at: 2023 4:30 PM.  Patient understanding: patient states understanding of the procedure being performed  Patient consent: the patient's understanding of the procedure matches consent given  Procedure consent: procedure consent matches procedure scheduled  Patient identity confirmed: verbally with patient      Injury location:  Hand  Location details:  Left hand  Injury type: Soft tissue  Neurovascular status: Neurovascularly intact    Distal perfusion: normal    Neurological function: normal    Range of motion: normal    Local anesthesia used?: No    General anesthesia used?: No    Immobilization:  Splint  Splint type:  Ulnar gutter  Supplies used:  Ortho-Glass  Neurovascular status: Neurovascularly intact    Distal perfusion: normal    Neurological function: normal    Range of motion: normal    Patient tolerance:  Patient tolerated the procedure well with no immediate complications          NAME: Ga Vizcarra is a 26 y.o. male  : 2000    MRN: [id number]  DATE: 2023  TIME: 6:53 PM    Assessment and Plan   Injury of right hand, initial encounter [S69.91XA]  1. Injury of right hand, initial encounter  Ambulatory Referral to Hand Surgery    naproxen (Naprosyn) 500 mg tablet      2.  Fall, initial encounter  XR hand 3+ vw right      -Concern for possible chip fracture/deformity at the distal aspect of the fifth metacarpal which is the point of maximal tenderness for the patient  -Patient placed in ulnar gutter splint of the left hand given referral to hand surgery for further evaluation  -Patient given prescription for naproxen, also advised use of Tylenol as primary agent for pain control and if pain still present or not improved take naproxen  -Advised patient to follow-up closely with hand surgery service in the office, also advised patient to seek care in ED and remove splint if he develops significantly worsening pain, sensory loss or motor deficits or otherwise worsening symptoms in his left hand  -All questions answered at bedside, patient and mother at bedside are amenable to plan and voiced understanding    Patient Instructions       Follow up with PCP in 3-5 days. Proceed to  ER if symptoms worsen. Chief Complaint     Chief Complaint   Patient presents with   • Hand Injury     Pt c/o right hand pain. Pt fell and landed on hands and now having right hand pain. Occurred 1400 today. History of Present Illness       32-year-old left-handed male male with history of generalized anxiety disorder, major depressive disorder, seasonal allergies presents with chief complaint of left hand pain after accidentally falling on it approximately 2 hours prior to arrival.  Patient states that he was walking and accidentally tripped and fell onto the ulnar aspect of his left hand. He denies loss of consciousness or head strike. He noticed some pain at the fourth and fifth distal metacarpal of his left hand since the fall with some intermittent tingling in his fourth and fifth digit. He denies overt sensory loss or motor weakness. He states he is able to make a closed fist with the left hand however it is painful. He denies additional injury. Hand Injury   The incident occurred 1 to 3 hours ago. The incident occurred at home. The injury mechanism was a fall. The pain is present in the left hand. The quality of the pain is described as aching. The pain does not radiate. The pain is at a severity of 5/10. The pain is moderate. The pain has been fluctuating since the incident. Pertinent negatives include no chest pain, muscle weakness, numbness or tingling. The symptoms are aggravated by movement and palpation. He has tried nothing for the symptoms. Review of Systems   Review of Systems   Constitutional: Negative for activity change, appetite change, chills, diaphoresis, fatigue and fever. HENT: Negative for ear pain, postnasal drip, rhinorrhea and sore throat. Eyes: Negative for pain and visual disturbance. Respiratory: Negative for cough, shortness of breath and wheezing. Cardiovascular: Negative for chest pain and palpitations. Gastrointestinal: Negative for abdominal distention, abdominal pain, anal bleeding, blood in stool, constipation, diarrhea, nausea, rectal pain and vomiting. Genitourinary: Negative for dysuria, enuresis, flank pain, frequency, genital sores, hematuria, penile discharge and penile pain. Musculoskeletal: Positive for arthralgias. Negative for back pain, gait problem, joint swelling, myalgias, neck pain and neck stiffness. Skin: Negative for color change and rash. Neurological: Negative for dizziness, tingling, tremors, seizures, syncope, facial asymmetry, speech difficulty, weakness, light-headedness, numbness and headaches. All other systems reviewed and are negative.         Current Medications       Current Outpatient Medications:   •  busPIRone (BUSPAR) 5 mg tablet, Take 2 tablets (10 mg total) by mouth 2 (two) times a day, Disp: 180 tablet, Rfl: 3  •  LORazepam (Ativan) 1 mg tablet, Take 1 tablet (1 mg total) by mouth once as needed for anxiety for up to 5 doses, Disp: 5 tablet, Rfl: 0  •  mirtazapine (REMERON) 30 mg tablet, Take 1 tablet (30 mg total) by mouth every evening, Disp: 90 tablet, Rfl: 0  •  naproxen (Naprosyn) 500 mg tablet, Take 1 tablet (500 mg total) by mouth 2 (two) times a day with meals for 14 days, Disp: 28 tablet, Rfl: 0  •  sertraline (Zoloft) 50 mg tablet, Take 1 tablet (50 mg total) by mouth daily, Disp: 90 tablet, Rfl: 0  •  busPIRone (BUSPAR) 15 mg tablet, Take 15 mg by mouth 2 (two) times a day (Patient not taking: Reported on 9/20/2023), Disp: , Rfl:   •  fluticasone (FLONASE) 50 mcg/act nasal spray, 2 sprays into each nostril daily (Patient not taking: Reported on 9/20/2023), Disp: 18.2 mL, Rfl: 2  •  hydrOXYzine pamoate (VISTARIL) 50 mg capsule, take 1 capsule by mouth twice a day if needed for anxiety (Patient not taking: Reported on 9/20/2023), Disp: , Rfl:   •  omeprazole (PriLOSEC) 20 mg delayed release capsule, Take 1 capsule (20 mg total) by mouth daily before breakfast (Patient not taking: Reported on 9/20/2023), Disp: 30 capsule, Rfl: 3  •  ondansetron (Zofran ODT) 4 mg disintegrating tablet, Take 1 tablet (4 mg total) by mouth every 6 (six) hours as needed for nausea or vomiting (Patient not taking: Reported on 9/26/2023), Disp: 20 tablet, Rfl: 0  •  sertraline (ZOLOFT) 100 mg tablet, Take 1 tablet (100 mg total) by mouth daily (Patient not taking: Reported on 9/20/2023), Disp: 100 tablet, Rfl: 0  •  sucralfate (CARAFATE) 1 g tablet, Take 1 tablet (1 g total) by mouth 4 (four) times a day (Patient not taking: Reported on 9/22/2023), Disp: 20 tablet, Rfl: 0    Current Allergies     Allergies as of 09/26/2023   • (No Known Allergies)            The following portions of the patient's history were reviewed and updated as appropriate: allergies, current medications, past family history, past medical history, past social history, past surgical history and problem list.     Past Medical History:   Diagnosis Date   • Anxiety    • GERD (gastroesophageal reflux disease)        No past surgical history on file.     Family History   Problem Relation Age of Onset   • Drug abuse Maternal Aunt    • Alcohol abuse Maternal Aunt    • Anxiety disorder Paternal Grandmother    • Completed Suicide  Cousin          Medications have been verified. Objective   /94   Pulse 78   Temp 98.5 °F (36.9 °C) (Tympanic)   Resp 18   SpO2 97%   No LMP for male patient. Physical Exam     Physical Exam  Vitals and nursing note reviewed. Constitutional:       General: He is not in acute distress. Appearance: Normal appearance. He is normal weight. He is not ill-appearing or toxic-appearing. HENT:      Head: Normocephalic and atraumatic. Right Ear: External ear normal.      Left Ear: External ear normal.      Nose: No congestion or rhinorrhea. Mouth/Throat:      Mouth: Mucous membranes are moist.      Pharynx: No oropharyngeal exudate or posterior oropharyngeal erythema. Eyes:      General: No visual field deficit. Right eye: No discharge. Left eye: No discharge. Pupils: Pupils are equal, round, and reactive to light. Neck:      Vascular: No carotid bruit. Cardiovascular:      Rate and Rhythm: Normal rate and regular rhythm. Pulmonary:      Effort: Pulmonary effort is normal. No respiratory distress. Breath sounds: Normal breath sounds. No stridor. No wheezing, rhonchi or rales. Abdominal:      General: Abdomen is flat. There is no distension. Palpations: There is no mass. Tenderness: There is no abdominal tenderness. There is no right CVA tenderness, left CVA tenderness, guarding or rebound. Hernia: No hernia is present. Musculoskeletal:         General: Tenderness present. No swelling, deformity or signs of injury. Right hand: Normal.      Left hand: Tenderness and bony tenderness present. No swelling, deformity or lacerations. Decreased range of motion. Normal strength. Normal sensation. There is no disruption of two-point discrimination. Normal capillary refill. Normal pulse. Arms:       Cervical back: No rigidity or tenderness. Right lower leg: No edema. Left lower leg: No edema. Lymphadenopathy:      Cervical: No cervical adenopathy.    Skin: General: Skin is warm and dry. Capillary Refill: Capillary refill takes less than 2 seconds. Neurological:      General: No focal deficit present. Mental Status: He is alert and oriented to person, place, and time. GCS: GCS eye subscore is 4. GCS verbal subscore is 5. GCS motor subscore is 6. Cranial Nerves: No cranial nerve deficit, dysarthria or facial asymmetry. Sensory: Sensation is intact. No sensory deficit. Motor: Motor function is intact. No weakness, tremor, atrophy, abnormal muscle tone, seizure activity or pronator drift. Coordination: Coordination is intact.  Coordination normal. Finger-Nose-Finger Test normal.      Gait: Gait normal.      Deep Tendon Reflexes: Reflexes normal.      Comments: No focal deficits appreciated in the bilateral median, ulnar or radial nerve distributions of the upper extremities    Left radial pulse 2+, cap refill less than 2 seconds distal to injury

## 2023-10-02 ENCOUNTER — OFFICE VISIT (OUTPATIENT)
Dept: FAMILY MEDICINE CLINIC | Facility: CLINIC | Age: 23
End: 2023-10-02
Payer: COMMERCIAL

## 2023-10-02 VITALS
HEART RATE: 92 BPM | DIASTOLIC BLOOD PRESSURE: 70 MMHG | BODY MASS INDEX: 20.05 KG/M2 | TEMPERATURE: 98 F | WEIGHT: 135.8 LBS | SYSTOLIC BLOOD PRESSURE: 108 MMHG | OXYGEN SATURATION: 94 %

## 2023-10-02 DIAGNOSIS — F33.2 MAJOR DEPRESSIVE DISORDER, RECURRENT SEVERE WITHOUT PSYCHOTIC FEATURES (HCC): Primary | ICD-10-CM

## 2023-10-02 DIAGNOSIS — K29.50 CHRONIC GASTRITIS WITHOUT BLEEDING, UNSPECIFIED GASTRITIS TYPE: ICD-10-CM

## 2023-10-02 DIAGNOSIS — R55 VASOVAGAL SYNCOPE: Chronic | ICD-10-CM

## 2023-10-02 DIAGNOSIS — J30.2 SEASONAL ALLERGIES: ICD-10-CM

## 2023-10-02 PROCEDURE — 99214 OFFICE O/P EST MOD 30 MIN: CPT | Performed by: FAMILY MEDICINE

## 2023-10-02 RX ORDER — OMEPRAZOLE 20 MG/1
40 CAPSULE, DELAYED RELEASE ORAL
Qty: 60 CAPSULE | Refills: 3 | Status: SHIPPED | OUTPATIENT
Start: 2023-10-02

## 2023-10-02 RX ORDER — CETIRIZINE HYDROCHLORIDE 10 MG/1
10 TABLET ORAL DAILY
Qty: 90 TABLET | Refills: 1 | Status: SHIPPED | OUTPATIENT
Start: 2023-10-02

## 2023-10-02 NOTE — PROGRESS NOTES
Assessment/Plan:      1. Major depressive disorder, recurrent severe without psychotic features [facility])  Assessment & Plan:  Unchanged  Patient continues to follow with psych and therapy  No change to current regimen  Patient is frustrated because he does not seem to be getting better. He also reports worsening agitation and anger  Encouraged patient to speak with therapist and release his frustration in a healthy way such as boxing or going to the gym  Patient continues to feel tired and c/o voice cracking: will check a testosterone level   He is trying to apply for disability due to severe MDD and agoraphobia. Recommend he follow up with his psychiatrist for assistance. Orders:  -     Testosterone, free, total; Future  -     TSH, 3rd generation with Free T4 reflex; Future    2. Seasonal allergies  Assessment & Plan:  Continue flonase and cetirizine  Patient has increased mucus production  Did not taper down on flonase    Orders:  -     cetirizine (ZyrTEC) 10 mg tablet; Take 1 tablet (10 mg total) by mouth daily    3. Chronic gastritis without bleeding, unspecified gastritis type  Assessment & Plan:  Had initial improvement with omeprazole 20mg but symptoms are recurring  Increase omeprazole to 40mg daily      Orders:  -     omeprazole (PriLOSEC) 20 mg delayed release capsule; Take 2 capsules (40 mg total) by mouth daily before breakfast    4. Vasovagal syncope  Assessment & Plan:  Recurrent  Had 1 episode when he had a bad anxiety attack  Had normal ecg in ED  Labs are stable  Will check echocardiogram and holter monitor    Orders:  -     Echo complete w/ contrast if indicated; Future; Expected date: 10/02/2023  -     Holter monitor; Future; Expected date: 10/02/2023            Subjective:      Patient ID: Lashonda Fuller is a [place] [employer] male presents today for routine follow up.   Since her last office visit patient has been evaluated multiple times in the emergency department for nausea, vomiting, and anxiety. He was also seen on 9/26/2023 at [facility] for injury to his left hand after falling on it. Placed in ulnar gutter splint and referred to hand surgery. Feels better and did not follow up yet. HPI    The following portions of the patient's history were reviewed and updated as appropriate: allergies, current medications, past family history, past medical history, past social history, past surgical history and problem list.    Review of Systems   Constitutional: Positive for fatigue. Negative for activity change, chills, diaphoresis and fever. HENT: Positive for congestion and postnasal drip. Negative for ear pain, hearing loss, rhinorrhea, sinus pressure, sinus pain, sneezing and sore throat. Respiratory: Positive for shortness of breath. Negative for cough, chest tightness and wheezing. Cardiovascular: Negative for chest pain, palpitations and leg swelling. Gastrointestinal: Negative for abdominal pain, blood in stool, constipation, diarrhea, nausea and vomiting. Genitourinary: Negative for dysuria, frequency, hematuria and urgency. Musculoskeletal: Negative for arthralgias and myalgias. Neurological: Positive for syncope. Negative for dizziness, weakness, light-headedness, numbness and headaches. Psychiatric/Behavioral: Positive for agitation and dysphoric mood. Negative for behavioral problems, confusion, decreased concentration, hallucinations, self-injury, sleep disturbance and suicidal ideas. The patient is nervous/anxious. The patient is not hyperactive. Objective:      /70 (BP Location: Left arm, Patient Position: Sitting, Cuff Size: Adult)   Pulse 92   Temp 98 °F (36.7 °C)   Wt 61.6 kg (135 lb 12.8 oz)   SpO2 94%   BMI 20.05 kg/m²          Physical Exam  Vitals reviewed. Constitutional:       General: He is not in acute distress. Appearance: He is well-developed. He is not diaphoretic. HENT:      Head: Normocephalic and atraumatic.       Right Ear: External ear normal.      Left Ear: External ear normal.      Nose: Nose normal. No congestion. Mouth/Throat:      Mouth: Mucous membranes are moist.      Pharynx: Oropharynx is clear. No oropharyngeal exudate. Eyes:      General: No scleral icterus. Pupils: Pupils are equal, round, and reactive to light. Neck:      Thyroid: No thyromegaly. Vascular: No JVD. Trachea: No tracheal deviation. Cardiovascular:      Rate and Rhythm: Normal rate and regular rhythm. Pulses: Normal pulses. Heart sounds: Normal heart sounds. No murmur heard. No friction rub. Pulmonary:      Effort: Pulmonary effort is normal. No respiratory distress. Breath sounds: Normal breath sounds. No wheezing or rales. Chest:      Chest wall: No tenderness. Abdominal:      General: Bowel sounds are normal. There is no distension. Palpations: Abdomen is soft. Tenderness: There is no abdominal tenderness. There is no right CVA tenderness, left CVA tenderness, guarding or rebound. Musculoskeletal:         General: No tenderness. Normal range of motion. Cervical back: Normal range of motion and neck supple. No tenderness. Right lower leg: No edema. Left lower leg: No edema. Lymphadenopathy:      Cervical: No cervical adenopathy. Skin:     General: Skin is warm and dry. Neurological:      Mental Status: He is alert and oriented to person, place, and time. Mental status is at baseline. Deep Tendon Reflexes: Reflexes are normal and symmetric. Psychiatric:         Mood and Affect: Mood normal.         Behavior: Behavior normal.         Thought Content:  Thought content normal.         Judgment: Judgment normal.

## 2023-10-03 NOTE — ASSESSMENT & PLAN NOTE
Continue flonase and cetirizine  Patient has increased mucus production  Did not taper down on flonase

## 2023-10-03 NOTE — ASSESSMENT & PLAN NOTE
· Had initial improvement with omeprazole 20mg but symptoms are recurring  · Increase omeprazole to 40mg daily Medical Necessity Information: It is in your best interest to select a reason for this procedure from the list below. All of these items fulfill various CMS LCD requirements except the new and changing color options. Spray Paint Text: The liquid nitrogen was applied to the skin utilizing a spray paint frosting technique. Show Spray Paint Technique Variable?: Yes Detail Level: Detailed Post-Care Instructions: I reviewed with the patient in detail post-care instructions. Patient is to wear sunprotection, and avoid picking at any of the treated lesions. Pt may apply Vaseline to crusted or scabbing areas. Add 52 Modifier (Optional): no Duration Of Freeze Thaw-Cycle (Seconds): 5-10 Consent: The patient's consent was obtained including but not limited to risks of crusting, scabbing, blistering, scarring, darker or lighter pigmentary change, recurrence, incomplete removal and infection. Number Of Freeze-Thaw Cycles: 2 freeze-thaw cycles Pared With?: curette Post-Care Instructions: I reviewed with the patient in detail post-care instructions. Patient is to wear sunprotection, and avoid picking at any of the treated lesions. Pt may shower as normal, and apply Vaseline to crusted or scabbing areas. Consent: The patient's verbal consent was obtained including but not limited to risks of crusting, scabbing, blistering, scarring, darker or lighter pigmentary change, recurrence, incomplete removal and infection. Duration Of Freeze Thaw-Cycle (Seconds): 5

## 2023-10-03 NOTE — ASSESSMENT & PLAN NOTE
Unchanged  · Patient continues to follow with psych and therapy  · No change to current regimen  · Patient is frustrated because he does not seem to be getting better. He also reports worsening agitation and anger  · Encouraged patient to speak with therapist and release his frustration in a healthy way such as boxing or going to the gym  · Patient continues to feel tired and c/o voice cracking: will check a testosterone level   · He is trying to apply for disability due to severe MDD and agoraphobia. Recommend he follow up with his psychiatrist for assistance.

## 2023-10-09 ENCOUNTER — APPOINTMENT (OUTPATIENT)
Dept: LAB | Age: 23
End: 2023-10-09
Payer: COMMERCIAL

## 2023-10-09 DIAGNOSIS — F33.2 MAJOR DEPRESSIVE DISORDER, RECURRENT SEVERE WITHOUT PSYCHOTIC FEATURES (HCC): ICD-10-CM

## 2023-10-09 LAB — TSH SERPL DL<=0.05 MIU/L-ACNC: 2.2 UIU/ML (ref 0.45–4.5)

## 2023-10-09 PROCEDURE — 84443 ASSAY THYROID STIM HORMONE: CPT

## 2023-10-09 PROCEDURE — 84403 ASSAY OF TOTAL TESTOSTERONE: CPT

## 2023-10-09 PROCEDURE — 84402 ASSAY OF FREE TESTOSTERONE: CPT

## 2023-10-09 PROCEDURE — 36415 COLL VENOUS BLD VENIPUNCTURE: CPT

## 2023-10-10 LAB
TESTOST FREE SERPL-MCNC: 13.2 PG/ML (ref 9.3–26.5)
TESTOST SERPL-MCNC: 702 NG/DL (ref 264–916)

## 2023-10-16 DIAGNOSIS — K21.9 GASTROESOPHAGEAL REFLUX DISEASE WITHOUT ESOPHAGITIS: Primary | ICD-10-CM

## 2023-10-16 DIAGNOSIS — K29.50 CHRONIC GASTRITIS WITHOUT BLEEDING, UNSPECIFIED GASTRITIS TYPE: ICD-10-CM

## 2023-10-17 ENCOUNTER — CONSULT (OUTPATIENT)
Dept: GASTROENTEROLOGY | Facility: MEDICAL CENTER | Age: 23
End: 2023-10-17

## 2023-10-17 VITALS
WEIGHT: 134.9 LBS | HEIGHT: 69 IN | BODY MASS INDEX: 19.98 KG/M2 | TEMPERATURE: 98.4 F | SYSTOLIC BLOOD PRESSURE: 118 MMHG | HEART RATE: 77 BPM | DIASTOLIC BLOOD PRESSURE: 82 MMHG

## 2023-10-17 DIAGNOSIS — R10.13 EPIGASTRIC PAIN: ICD-10-CM

## 2023-10-17 DIAGNOSIS — K21.9 GASTROESOPHAGEAL REFLUX DISEASE WITHOUT ESOPHAGITIS: ICD-10-CM

## 2023-10-17 DIAGNOSIS — R11.2 NAUSEA AND VOMITING, UNSPECIFIED VOMITING TYPE: Primary | ICD-10-CM

## 2023-10-17 NOTE — PROGRESS NOTES
[facility] - Outpatient Consultation  Trevon Valentino 26 y.o. male MRN: [id number]  Encounter: [id number]      Assessment and Plan:    1. Nausea and vomiting, unspecified vomiting type    2. Gastroesophageal reflux disease without esophagitis    3. Epigastric pain        26 y.o. male w/ hx of depression, anxiety, panic attacks, chronic marijuana use referred to GI for acute on chronic GERD, epigastric pain, nausea, and vomiting. Given severity of symptoms and relatively severe exacerbation starting 2 months ago, I think it is reasonable to pursue EGD to rule out organic causes such as PUD. Differential also includes H.pylori, functional dyspepsia, and cannabinoid hyperemesis syndrome (which I am suspecting). I have asked him to stop PPI 2-4 weeks prior to EGD to improve the yield of the test, especially since it is not helping him anyway. - EGD, stop PPI 2-4 weeks beforehand  - H.pylori stool Ag (after stopping PPI for 2 weeks)  - Suspect cannabinoid hyperemesis syndrome. Recommend complete abstinence from marijuana    Orders Placed This Encounter   Procedures    H. pylori antigen, stool    EGD     ______________________________________________________________________    History of Present Illness:    Trevon Valentino is a 26 y.o. male w/ hx of depression, anxiety, panic attacks, chronic marijuana use referred to GI for acute on chronic GERD, epigastric pain, nausea, and vomiting. Patient was evaluated in GI office in 2021 for epigastric pain not improved with H2RA and PPI. EGD was ordered but not completed. Patient states that he was feeling a bit better so decided to cancel. That being said, he notes mild but persistent, chronic epigastric pain, GERD, and nausea over the last 2 years.     However, in the last 2 months, he had acute worsening of symptoms including multiple episodes of vomiting and inability to keep any food down which prompted several ER visits in 9/2023 (some of which are more related to anxiety and panic attacks). He was restarted on omeprazole 40 mg daily which initially seemed to help but seems to make no difference now. Carafate and Zofran were minimally helpful as well. He does report smoking marijuana every few hours for the last 3 years. He is unsure if symptoms are improved with hot showers. Review of Systems:  As per HPI. Otherwise negative. Historical Information   Past Medical History:   Diagnosis Date    Anxiety     GERD (gastroesophageal reflux disease)      History reviewed. No pertinent surgical history. Social History   Social History     Substance and Sexual Activity   Alcohol Use Not Currently     Social History     Substance and Sexual Activity   Drug Use Not Currently    Types: Marijuana    Comment: last time a month ago     Social History     Tobacco Use   Smoking Status Never   Smokeless Tobacco Never     Family History   Problem Relation Age of Onset    Drug abuse Maternal Aunt     Alcohol abuse Maternal Aunt     Anxiety disorder Paternal Grandmother     Completed Suicide  Cousin        Meds/Allergies       Current Outpatient Medications:     busPIRone (BUSPAR) 5 mg tablet    cetirizine (ZyrTEC) 10 mg tablet    LORazepam (Ativan) 1 mg tablet    mirtazapine (REMERON) 30 mg tablet    omeprazole (PriLOSEC) 20 mg delayed release capsule    sucralfate (CARAFATE) 1 g tablet    busPIRone (BUSPAR) 15 mg tablet    fluticasone (FLONASE) 50 mcg/act nasal spray    hydrOXYzine pamoate (VISTARIL) 50 mg capsule    naproxen (Naprosyn) 500 mg tablet    ondansetron (Zofran ODT) 4 mg disintegrating tablet    sertraline (ZOLOFT) 100 mg tablet    sertraline (Zoloft) 50 mg tablet    No Known Allergies        Objective     Blood pressure 118/82, pulse 77, temperature 98.4 °F (36.9 °C), temperature source Tympanic, height 5' 9" (1.753 m), weight 61.2 kg (134 lb 14.4 oz). Body mass index is 19.92 kg/m².         Physical Exam:      General: No acute distress  Abdomen: Soft, moderate epigastric tenderness, non-distended, normoactive bowel sounds  Extremities: No lower extremity edema  Neuro: Awake, alert, oriented x 3    Lab Results:   Lab Results   Component Value Date/Time    WBC 6.50 09/22/2023 01:04 PM    HGB 16.0 09/22/2023 01:04 PM     09/22/2023 01:04 PM    SODIUM 141 09/22/2023 01:04 PM    K 3.0 (L) 09/22/2023 01:04 PM     09/22/2023 01:04 PM    CO2 21 09/22/2023 01:04 PM    BUN 12 09/22/2023 01:04 PM    CREATININE 0.89 09/22/2023 01:04 PM    AST 19 09/22/2023 01:04 PM    ALT 30 09/22/2023 01:04 PM    ALKPHOS 59 09/22/2023 01:04 PM    TBILI 1.06 (H) 09/22/2023 01:04 PM    ALB 4.7 09/22/2023 01:04 PM    INR 0.97 05/15/2020 05:24 AM    LIPASE 6 (L) 09/22/2023 01:04 PM

## 2023-10-22 RX ORDER — SODIUM CHLORIDE, SODIUM LACTATE, POTASSIUM CHLORIDE, CALCIUM CHLORIDE 600; 310; 30; 20 MG/100ML; MG/100ML; MG/100ML; MG/100ML
50 INJECTION, SOLUTION INTRAVENOUS CONTINUOUS
Status: CANCELLED | OUTPATIENT
Start: 2023-10-22

## 2023-10-23 ENCOUNTER — ANESTHESIA EVENT (OUTPATIENT)
Dept: GASTROENTEROLOGY | Facility: HOSPITAL | Age: 23
End: 2023-10-23

## 2023-10-23 ENCOUNTER — HOSPITAL ENCOUNTER (OUTPATIENT)
Dept: GASTROENTEROLOGY | Facility: HOSPITAL | Age: 23
Setting detail: OUTPATIENT SURGERY
Discharge: HOME | End: 2023-10-23
Attending: STUDENT IN AN ORGANIZED HEALTH CARE EDUCATION/TRAINING PROGRAM | Admitting: INTERNAL MEDICINE
Payer: COMMERCIAL

## 2023-10-23 ENCOUNTER — ANESTHESIA (OUTPATIENT)
Dept: GASTROENTEROLOGY | Facility: HOSPITAL | Age: 23
End: 2023-10-23

## 2023-10-23 VITALS
SYSTOLIC BLOOD PRESSURE: 123 MMHG | TEMPERATURE: 97.8 F | RESPIRATION RATE: 18 BRPM | HEART RATE: 83 BPM | DIASTOLIC BLOOD PRESSURE: 70 MMHG | OXYGEN SATURATION: 98 %

## 2023-10-23 DIAGNOSIS — R10.13 EPIGASTRIC PAIN: ICD-10-CM

## 2023-10-23 DIAGNOSIS — R11.2 NAUSEA AND VOMITING, UNSPECIFIED VOMITING TYPE: ICD-10-CM

## 2023-10-23 DIAGNOSIS — K21.9 GASTROESOPHAGEAL REFLUX DISEASE WITHOUT ESOPHAGITIS: ICD-10-CM

## 2023-10-23 PROCEDURE — 88305 TISSUE EXAM BY PATHOLOGIST: CPT | Performed by: PATHOLOGY

## 2023-10-23 RX ORDER — SODIUM CHLORIDE, SODIUM LACTATE, POTASSIUM CHLORIDE, CALCIUM CHLORIDE 600; 310; 30; 20 MG/100ML; MG/100ML; MG/100ML; MG/100ML
50 INJECTION, SOLUTION INTRAVENOUS CONTINUOUS
Status: CANCELLED | OUTPATIENT
Start: 2023-10-23

## 2023-10-23 RX ORDER — LIDOCAINE HYDROCHLORIDE 10 MG/ML
INJECTION, SOLUTION EPIDURAL; INFILTRATION; INTRACAUDAL; PERINEURAL AS NEEDED
Status: DISCONTINUED | OUTPATIENT
Start: 2023-10-23 | End: 2023-10-23

## 2023-10-23 RX ORDER — PROPOFOL 10 MG/ML
INJECTION, EMULSION INTRAVENOUS AS NEEDED
Status: DISCONTINUED | OUTPATIENT
Start: 2023-10-23 | End: 2023-10-23

## 2023-10-23 RX ORDER — SODIUM CHLORIDE, SODIUM LACTATE, POTASSIUM CHLORIDE, CALCIUM CHLORIDE 600; 310; 30; 20 MG/100ML; MG/100ML; MG/100ML; MG/100ML
INJECTION, SOLUTION INTRAVENOUS CONTINUOUS PRN
Status: DISCONTINUED | OUTPATIENT
Start: 2023-10-23 | End: 2023-10-23

## 2023-10-23 RX ORDER — SODIUM CHLORIDE, SODIUM LACTATE, POTASSIUM CHLORIDE, CALCIUM CHLORIDE 600; 310; 30; 20 MG/100ML; MG/100ML; MG/100ML; MG/100ML
50 INJECTION, SOLUTION INTRAVENOUS CONTINUOUS
Status: DISCONTINUED | OUTPATIENT
Start: 2023-10-23 | End: 2023-10-27 | Stop reason: HOSPADM

## 2023-10-23 RX ADMIN — PROPOFOL 50 MG: 10 INJECTION, EMULSION INTRAVENOUS at 12:13

## 2023-10-23 RX ADMIN — SODIUM CHLORIDE, SODIUM LACTATE, POTASSIUM CHLORIDE, AND CALCIUM CHLORIDE 50 ML/HR: .6; .31; .03; .02 INJECTION, SOLUTION INTRAVENOUS at 11:23

## 2023-10-23 RX ADMIN — PROPOFOL 150 MG: 10 INJECTION, EMULSION INTRAVENOUS at 12:12

## 2023-10-23 RX ADMIN — PROPOFOL 50 MG: 10 INJECTION, EMULSION INTRAVENOUS at 12:22

## 2023-10-23 RX ADMIN — SODIUM CHLORIDE, SODIUM LACTATE, POTASSIUM CHLORIDE, AND CALCIUM CHLORIDE: .6; .31; .03; .02 INJECTION, SOLUTION INTRAVENOUS at 11:21

## 2023-10-23 RX ADMIN — PROPOFOL 50 MG: 10 INJECTION, EMULSION INTRAVENOUS at 12:17

## 2023-10-23 RX ADMIN — LIDOCAINE HYDROCHLORIDE 50 MG: 10 INJECTION, SOLUTION EPIDURAL; INFILTRATION; INTRACAUDAL at 12:12

## 2023-10-23 NOTE — ANESTHESIA PREPROCEDURE EVALUATION
Procedure:  EGD    Relevant Problems   CARDIO   (+) Intercostal pain      GI/HEPATIC   (+) Gastroesophageal reflux disease without esophagitis      NEURO/PSYCH   (+) Generalized anxiety disorder   (+) Major depressive disorder, recurrent severe without psychotic features (HCC)   (+) Other headache syndrome      Other   (+) Vasovagal syncope        Physical Exam    Airway    Mallampati score: I  TM Distance: >3 FB  Neck ROM: full     Dental   Comment: Front upper rt teeth bonded     Cardiovascular  Cardiovascular exam normal    Pulmonary  Pulmonary exam normal     Other Findings        Anesthesia Plan  ASA Score- 2     Anesthesia Type- IV sedation with anesthesia with ASA Monitors. Additional Monitors:     Airway Plan:            Plan Factors-Exercise tolerance (METS): >4 METS. Chart reviewed. Existing labs reviewed. Patient summary reviewed. Patient is a current smoker. Patient instructed to abstain from smoking on day of procedure. Patient did not smoke on day of surgery. Induction-     Postoperative Plan-     Informed Consent- Anesthetic plan and risks discussed with patient and mother. I personally reviewed this patient with the CRNA. Discussed and agreed on the Anesthesia Plan with the CRNA. Delvin Petty No results found for: "HGBA1C"    Lab Results   Component Value Date    K 3.0 (L) 09/22/2023     09/22/2023    CO2 21 09/22/2023    BUN 12 09/22/2023    CREATININE 0.89 09/22/2023    GLUF 104 (H) 07/18/2023    CALCIUM 9.7 09/22/2023    AST 19 09/22/2023    ALT 30 09/22/2023    ALKPHOS 59 09/22/2023    EGFR 121 09/22/2023       Lab Results   Component Value Date    WBC 6.50 09/22/2023    HGB 16.0 09/22/2023    HCT 46.9 09/22/2023    MCV 87 09/22/2023     09/22/2023   Normal sinus rhythm  Normal ECG  When compared with ECG of 14-MAY-2023 00:13,  No significant change was found  Confirmed by Ruby Haque ([id number]) on 9/24/2023 12:59:29 PM    Echo 2020 1.  Normal left ventricular size and systolic and diastolic function, EF around 56%. 2. Normal right ventricular size and systolic function. 3. Normal left and right atrial cavity size. 4. Normal aortic valve with no stenosis or regurgitation. 5. Mild mitral valve leaflet thickening, trace mitral valve regurgitation. 6. Trace tricuspid valve regurgitation. 7. No obvious pulmonary hypertension. 8. No pericardial effusion. No previous echocardiogram is available for comparison.

## 2023-10-23 NOTE — ANESTHESIA POSTPROCEDURE EVALUATION
Post-Op Assessment Note    No notable events documented.     BP      Temp      Pulse     Resp      SpO2

## 2023-10-23 NOTE — H&P
History and Physical - [facility]  Leroy Cheadle 26 y.o. male MRN: [id number]                  HPI: Leroy Cheadle is a 26y.o. year old male who presents for EGD evaluation for nausea and epigastric discomfort. REVIEW OF SYSTEMS: Per the HPI, and otherwise unremarkable. Historical Information   Past Medical History:   Diagnosis Date    Anxiety     GERD (gastroesophageal reflux disease)      History reviewed. No pertinent surgical history.   Social History   Social History     Substance and Sexual Activity   Alcohol Use Not Currently     Social History     Substance and Sexual Activity   Drug Use Not Currently    Types: Marijuana    Comment: last time a month ago     Social History     Tobacco Use   Smoking Status Never   Smokeless Tobacco Never     Family History   Problem Relation Age of Onset    Drug abuse Maternal Aunt     Alcohol abuse Maternal Aunt     Anxiety disorder Paternal Grandmother     Completed Suicide  Cousin        Meds/Allergies       Current Outpatient Medications:     LORazepam (Ativan) 1 mg tablet    sucralfate (CARAFATE) 1 g tablet    busPIRone (BUSPAR) 15 mg tablet    busPIRone (BUSPAR) 5 mg tablet    cetirizine (ZyrTEC) 10 mg tablet    fluticasone (FLONASE) 50 mcg/act nasal spray    hydrOXYzine pamoate (VISTARIL) 50 mg capsule    mirtazapine (REMERON) 30 mg tablet    naproxen (Naprosyn) 500 mg tablet    ondansetron (Zofran ODT) 4 mg disintegrating tablet    sertraline (ZOLOFT) 100 mg tablet    sertraline (Zoloft) 50 mg tablet    Current Facility-Administered Medications:     lactated ringers infusion, 50 mL/hr, Intravenous, Continuous, 50 mL/hr at 10/23/23 1123    No Known Allergies    Objective     /88 (BP Location: Left arm)   Pulse 81   Temp 98.7 °F (37.1 °C) (Temporal)   Resp 20   SpO2 97%       PHYSICAL EXAM    Gen: NAD  Head: NCAT  CV: RRR  CHEST: Clear  ABD: soft, NT/ND  EXT: no edema      ASSESSMENT/PLAN:  This is a 26y.o. year old male here for EGD eval, and he is stable and optimized for his procedure.

## 2023-10-24 PROCEDURE — 88305 TISSUE EXAM BY PATHOLOGIST: CPT | Performed by: PATHOLOGY

## 2023-10-25 ENCOUNTER — TELEPHONE (OUTPATIENT)
Dept: GASTROENTEROLOGY | Facility: CLINIC | Age: 23
End: 2023-10-25

## 2023-10-25 NOTE — TELEPHONE ENCOUNTER
----- Message from Rachel Aceves MD sent at 10/25/2023  1:29 PM EDT -----  Call patient to report normal results

## 2023-10-25 NOTE — TELEPHONE ENCOUNTER
I left a voice message to inform patient of the results and provided the office number with further questions or concerns, Thank you.

## 2023-10-27 ENCOUNTER — TELEPHONE (OUTPATIENT)
Dept: FAMILY MEDICINE CLINIC | Facility: CLINIC | Age: 23
End: 2023-10-27

## 2023-10-27 ENCOUNTER — TELEPHONE (OUTPATIENT)
Age: 23
End: 2023-10-27

## 2023-10-27 NOTE — TELEPHONE ENCOUNTER
Patient calling in for results. Aware that biopsies are normals. Patient asked if he should follow up with GI or his family physician for his reflux. Patient scheduled for a follow up visit. He was encouraged to get his Stool studies done and restart his PPI. Patient states that he does have his reflux medication at home and does not need any. A follow up at was made for him in January.

## 2023-10-31 ENCOUNTER — OFFICE VISIT (OUTPATIENT)
Dept: FAMILY MEDICINE CLINIC | Facility: CLINIC | Age: 23
End: 2023-10-31
Payer: COMMERCIAL

## 2023-10-31 VITALS
OXYGEN SATURATION: 96 % | BODY MASS INDEX: 19.91 KG/M2 | HEART RATE: 78 BPM | WEIGHT: 134.4 LBS | SYSTOLIC BLOOD PRESSURE: 128 MMHG | DIASTOLIC BLOOD PRESSURE: 90 MMHG | HEIGHT: 69 IN | TEMPERATURE: 97.6 F

## 2023-10-31 DIAGNOSIS — R10.13 EPIGASTRIC BURNING SENSATION: ICD-10-CM

## 2023-10-31 PROCEDURE — 99214 OFFICE O/P EST MOD 30 MIN: CPT | Performed by: FAMILY MEDICINE

## 2023-10-31 RX ORDER — SUCRALFATE 1 G/1
1 TABLET ORAL 4 TIMES DAILY
Qty: 90 TABLET | Refills: 3 | Status: SHIPPED | OUTPATIENT
Start: 2023-10-31

## 2023-10-31 RX ORDER — OMEPRAZOLE 20 MG/1
20 CAPSULE, DELAYED RELEASE ORAL
Qty: 30 CAPSULE | Refills: 5 | Status: SHIPPED | OUTPATIENT
Start: 2023-10-31 | End: 2024-04-28

## 2023-10-31 NOTE — ASSESSMENT & PLAN NOTE
Worsening  Patient underwent EGD 10/23/23  Patient was found to have a Schatzki ring which was dilated during procedure as well as a small hiatal hernia  Discussed with patient that these 2 things will worsen heartburn and reflux  And this new onset chest pressure that occurs when supine and resolves with standing is most likely related to this  Restart Carafate and omeprazole 20 mg daily  Recommend elevating head of bed  Has follow-up with GI in January 2024  Discussed return precautions

## 2023-10-31 NOTE — PROGRESS NOTES
Assessment/Plan:      1. Epigastric burning sensation  Assessment & Plan:  Worsening  Patient underwent EGD 10/23/23  Patient was found to have a Schatzki ring which was dilated during procedure as well as a small hiatal hernia  Discussed with patient that these 2 things will worsen heartburn and reflux  And this new onset chest pressure that occurs when supine and resolves with standing is most likely related to this  Restart Carafate and omeprazole 20 mg daily  Recommend elevating head of bed  Has follow-up with GI in January 2024  Discussed return precautions    Orders:  -     sucralfate (CARAFATE) 1 g tablet; Take 1 tablet (1 g total) by mouth 4 (four) times a day  -     omeprazole (PriLOSEC) 20 mg delayed release capsule; Take 1 capsule (20 mg total) by mouth daily before breakfast            Subjective:      Patient ID: Mirtha Mejias is a 26 y.o. male presents today to discuss egd and chest pain. He reports increased pressure and burning in his chest when he is laying down. Ongoing for the past week after having endoscopy. Wakes him out out of sleep. Will last for about an hour and self resolve. Improves when he stands up and walks around    Chest Pain   Pertinent negatives include no abdominal pain, cough, diaphoresis, dizziness, fever, headaches, nausea, numbness, palpitations, shortness of breath, vomiting or weakness. The following portions of the patient's history were reviewed and updated as appropriate: allergies, current medications, past family history, past medical history, past social history, past surgical history, and problem list.    Review of Systems   Constitutional:  Negative for activity change, chills, diaphoresis and fever. HENT:  Negative for ear pain, hearing loss, postnasal drip, rhinorrhea, sinus pressure, sinus pain, sneezing and sore throat. Respiratory:  Negative for cough, chest tightness, shortness of breath and wheezing.     Cardiovascular:  Positive for chest pain. Negative for palpitations and leg swelling. Gastrointestinal:  Negative for abdominal pain, blood in stool, constipation, diarrhea, nausea and vomiting. Genitourinary:  Negative for dysuria, frequency, hematuria and urgency. Musculoskeletal:  Negative for arthralgias and myalgias. Neurological:  Negative for dizziness, syncope, weakness, light-headedness, numbness and headaches. Objective:      /90 (BP Location: Left arm, Patient Position: Sitting, Cuff Size: Standard)   Pulse 78   Temp 97.6 °F (36.4 °C) (Temporal)   Ht 5' 9" (1.753 m)   Wt 61 kg (134 lb 6.4 oz)   SpO2 96%   BMI 19.85 kg/m²          Physical Exam  Vitals reviewed. Constitutional:       General: He is not in acute distress. Appearance: He is well-developed. He is not diaphoretic. HENT:      Head: Normocephalic and atraumatic. Right Ear: External ear normal.      Left Ear: External ear normal.      Nose: Nose normal. No congestion. Mouth/Throat:      Mouth: Mucous membranes are moist.      Pharynx: Oropharynx is clear. No oropharyngeal exudate. Eyes:      General: No scleral icterus. Pupils: Pupils are equal, round, and reactive to light. Neck:      Thyroid: No thyromegaly. Vascular: No JVD. Trachea: No tracheal deviation. Cardiovascular:      Rate and Rhythm: Normal rate and regular rhythm. Pulses: Normal pulses. Heart sounds: Normal heart sounds. No murmur heard. No friction rub. Pulmonary:      Effort: Pulmonary effort is normal. No respiratory distress. Breath sounds: Normal breath sounds. No wheezing or rales. Chest:      Chest wall: No tenderness. Abdominal:      General: Bowel sounds are normal. There is no distension. Palpations: Abdomen is soft. Tenderness: There is no abdominal tenderness. There is no right CVA tenderness, left CVA tenderness, guarding or rebound. Musculoskeletal:         General: No tenderness.  Normal range of motion. Cervical back: Normal range of motion and neck supple. No tenderness. Right lower leg: No edema. Left lower leg: No edema. Lymphadenopathy:      Cervical: No cervical adenopathy. Skin:     General: Skin is warm and dry. Neurological:      Mental Status: He is alert and oriented to person, place, and time. Mental status is at baseline. Deep Tendon Reflexes: Reflexes are normal and symmetric. Psychiatric:         Mood and Affect: Mood normal.         Behavior: Behavior normal.         Thought Content:  Thought content normal.         Judgment: Judgment normal.

## 2023-11-02 ENCOUNTER — OFFICE VISIT (OUTPATIENT)
Dept: URGENT CARE | Age: 23
End: 2023-11-02
Payer: COMMERCIAL

## 2023-11-02 VITALS
HEART RATE: 67 BPM | OXYGEN SATURATION: 99 % | DIASTOLIC BLOOD PRESSURE: 74 MMHG | RESPIRATION RATE: 18 BRPM | SYSTOLIC BLOOD PRESSURE: 124 MMHG | TEMPERATURE: 98.2 F

## 2023-11-02 DIAGNOSIS — L50.9 URTICARIA: Primary | ICD-10-CM

## 2023-11-02 PROCEDURE — 99213 OFFICE O/P EST LOW 20 MIN: CPT | Performed by: EMERGENCY MEDICINE

## 2023-11-02 NOTE — PROGRESS NOTES
[facility]        NAME: Darcy Astudillo is a 26 y.o. male  : 2000    MRN: [id number]  DATE: 2023  TIME: 6:00 PM    Assessment and Plan   Urticaria [L50.9]  1. Urticaria              Patient Instructions     Allegra over the counter  Benadryl as needed at night (do not drive or operate heavy machinery after taking)  Follow up with PCP in 3-5 days. Proceed to  ER if symptoms worsen. Chief Complaint     Chief Complaint   Patient presents with    Rash     Woke up last PM and started full body itching. Now has red patches all over body. History of Present Illness       Patient is a 27 yo male with no significant PMH presenting in the clinic today for rash x 1 day. Admits pruritic erythematous rash located along trunk, back, and neck. Denies fever, chills, purulent drainage, chest pain, and SOB. Denies the use of OTC tx for sx management. Denies household members with a similar rash. Denies recent sick contacts. Denies recent changes in medications, diet, soaps, detergents, clothing, etc.        Review of Systems   Review of Systems   Constitutional:  Negative for chills and fever. Respiratory:  Negative for shortness of breath. Cardiovascular:  Negative for chest pain. Skin:  Positive for rash. Negative for wound.          Current Medications       Current Outpatient Medications:     busPIRone (BUSPAR) 5 mg tablet, Take 2 tablets (10 mg total) by mouth 2 (two) times a day, Disp: 180 tablet, Rfl: 3    cetirizine (ZyrTEC) 10 mg tablet, Take 1 tablet (10 mg total) by mouth daily, Disp: 90 tablet, Rfl: 1    LORazepam (Ativan) 1 mg tablet, Take 1 tablet (1 mg total) by mouth once as needed for anxiety for up to 5 doses, Disp: 5 tablet, Rfl: 0    mirtazapine (REMERON) 30 mg tablet, Take 1 tablet (30 mg total) by mouth every evening, Disp: 90 tablet, Rfl: 0    omeprazole (PriLOSEC) 20 mg delayed release capsule, Take 1 capsule (20 mg total) by mouth daily before breakfast, Disp: 30 capsule, Rfl: 5    sucralfate (CARAFATE) 1 g tablet, Take 1 tablet (1 g total) by mouth 4 (four) times a day, Disp: 90 tablet, Rfl: 3    busPIRone (BUSPAR) 15 mg tablet, Take 15 mg by mouth 2 (two) times a day (Patient not taking: Reported on 9/20/2023), Disp: , Rfl:     fluticasone (FLONASE) 50 mcg/act nasal spray, 2 sprays into each nostril daily (Patient not taking: Reported on 9/20/2023), Disp: 18.2 mL, Rfl: 2    hydrOXYzine pamoate (VISTARIL) 50 mg capsule, take 1 capsule by mouth twice a day if needed for anxiety (Patient not taking: Reported on 9/20/2023), Disp: , Rfl:     naproxen (Naprosyn) 500 mg tablet, Take 1 tablet (500 mg total) by mouth 2 (two) times a day with meals for 14 days, Disp: 28 tablet, Rfl: 0    ondansetron (Zofran ODT) 4 mg disintegrating tablet, Take 1 tablet (4 mg total) by mouth every 6 (six) hours as needed for nausea or vomiting (Patient not taking: Reported on 9/26/2023), Disp: 20 tablet, Rfl: 0    sertraline (ZOLOFT) 100 mg tablet, Take 1 tablet (100 mg total) by mouth daily (Patient not taking: Reported on 9/20/2023), Disp: 100 tablet, Rfl: 0    sertraline (Zoloft) 50 mg tablet, Take 1 tablet (50 mg total) by mouth daily (Patient not taking: Reported on 10/17/2023), Disp: 90 tablet, Rfl: 0    Current Allergies     Allergies as of 11/02/2023    (No Known Allergies)            The following portions of the patient's history were reviewed and updated as appropriate: allergies, current medications, past family history, past medical history, past social history, past surgical history and problem list.     Past Medical History:   Diagnosis Date    Anxiety     GERD (gastroesophageal reflux disease)        No past surgical history on file. Family History   Problem Relation Age of Onset    Drug abuse Maternal Aunt     Alcohol abuse Maternal Aunt     Anxiety disorder Paternal Grandmother     Completed Suicide  Cousin          Medications have been verified.         Objective   BP 124/74   Pulse 67   Temp 98.2 °F (36.8 °C)   Resp 18   SpO2 99%        Physical Exam     Physical Exam  Vitals reviewed. Constitutional:       General: He is not in acute distress. Appearance: Normal appearance. He is normal weight. He is not ill-appearing. HENT:      Head: Normocephalic. Nose: Nose normal. No congestion or rhinorrhea. Mouth/Throat:      Mouth: Mucous membranes are moist.   Eyes:      General:         Right eye: No discharge. Left eye: No discharge. Conjunctiva/sclera: Conjunctivae normal.   Cardiovascular:      Rate and Rhythm: Regular rhythm. Pulses: Normal pulses. Heart sounds: Normal heart sounds. No friction rub. No gallop. Pulmonary:      Effort: Pulmonary effort is normal.      Breath sounds: Normal breath sounds. No wheezing, rhonchi or rales. Musculoskeletal:      Cervical back: Normal range of motion and neck supple. Skin:     General: Skin is warm. Findings: Rash present. Rash is urticarial.      Comments: Urticarial rash located along abdomen, right axilla, left side of neck, and back. No drainage noted. Neurological:      Mental Status: He is alert.    Psychiatric:         Mood and Affect: Mood normal.         Behavior: Behavior normal.

## 2023-11-02 NOTE — PATIENT INSTRUCTIONS
Allegra over the counter  Benadryl as needed at night (do not drive or operate heavy machinery after taking)  Follow up with PCP in 3-5 days.   Proceed to ER if symptoms worsen

## 2023-11-06 ENCOUNTER — TELEPHONE (OUTPATIENT)
Dept: FAMILY MEDICINE CLINIC | Facility: CLINIC | Age: 23
End: 2023-11-06

## 2023-11-07 ENCOUNTER — OFFICE VISIT (OUTPATIENT)
Dept: FAMILY MEDICINE CLINIC | Facility: CLINIC | Age: 23
End: 2023-11-07
Payer: COMMERCIAL

## 2023-11-07 VITALS
SYSTOLIC BLOOD PRESSURE: 112 MMHG | BODY MASS INDEX: 19.93 KG/M2 | HEART RATE: 57 BPM | TEMPERATURE: 98.7 F | OXYGEN SATURATION: 99 % | HEIGHT: 69 IN | DIASTOLIC BLOOD PRESSURE: 80 MMHG | WEIGHT: 134.6 LBS

## 2023-11-07 DIAGNOSIS — L20.9 ATOPIC DERMATITIS, UNSPECIFIED TYPE: Primary | ICD-10-CM

## 2023-11-07 PROCEDURE — 99213 OFFICE O/P EST LOW 20 MIN: CPT | Performed by: FAMILY MEDICINE

## 2023-11-07 RX ORDER — PREDNISONE 10 MG/1
TABLET ORAL
Qty: 30 TABLET | Refills: 0 | Status: SHIPPED | OUTPATIENT
Start: 2023-11-07 | End: 2023-11-19

## 2023-11-07 NOTE — PROGRESS NOTES
Assessment/Plan:      1. Atopic dermatitis, unspecified type  Assessment & Plan:  New  Unclear cause  May be 2/2 carafate  Discontinue carafate  Reviewed images of rash  Appear urticarial vs dermatitis  Positive Dermatographia on exam  Start prednisone taper  Avoid hot showers  Follow up as needed    Orders:  -     predniSONE 10 mg tablet; Take 4 tablets (40 mg total) by mouth daily for 3 days, THEN 3 tablets (30 mg total) daily for 3 days, THEN 2 tablets (20 mg total) daily for 3 days, THEN 1 tablet (10 mg total) daily for 3 days. Subjective:      Patient ID: Rajesh Reynolds is a 26 y.o. male presents today for skin rash. Started 5 days ago. He was seen by urgent care 4 days ago. He was started on benadryl. Rash comes and goes. No hx of eczema. Took carafate that day but have not taken it since. Worse after warm showers. HPI    The following portions of the patient's history were reviewed and updated as appropriate: allergies, current medications, past family history, past medical history, past social history, past surgical history, and problem list.    Review of Systems   Constitutional:  Negative for activity change, chills, diaphoresis and fever. HENT:  Negative for ear pain, hearing loss, postnasal drip, rhinorrhea, sinus pressure, sinus pain, sneezing and sore throat. Respiratory:  Negative for cough, chest tightness, shortness of breath and wheezing. Cardiovascular:  Negative for chest pain, palpitations and leg swelling. Gastrointestinal:  Negative for abdominal pain, blood in stool, constipation, diarrhea, nausea and vomiting. Genitourinary:  Negative for dysuria, frequency, hematuria and urgency. Musculoskeletal:  Negative for arthralgias and myalgias. Skin:  Positive for rash. Neurological:  Negative for dizziness, syncope, weakness, light-headedness, numbness and headaches.          Objective:      /80 (BP Location: Left arm, Patient Position: Sitting, Cuff Size: Adult)   Pulse 57   Temp 98.7 °F (37.1 °C) (Temporal)   Ht 5' 9" (1.753 m)   Wt 61.1 kg (134 lb 9.6 oz)   SpO2 99%   BMI 19.88 kg/m²          Physical Exam  Vitals reviewed. Constitutional:       General: He is not in acute distress. Appearance: He is well-developed. He is not diaphoretic. HENT:      Head: Normocephalic and atraumatic. Right Ear: External ear normal.      Left Ear: External ear normal.      Nose: Nose normal. No congestion. Mouth/Throat:      Mouth: Mucous membranes are moist.      Pharynx: Oropharynx is clear. No oropharyngeal exudate. Eyes:      General: No scleral icterus. Pupils: Pupils are equal, round, and reactive to light. Neck:      Thyroid: No thyromegaly. Vascular: No JVD. Trachea: No tracheal deviation. Cardiovascular:      Rate and Rhythm: Normal rate and regular rhythm. Pulses: Normal pulses. Heart sounds: Normal heart sounds. No murmur heard. No friction rub. Pulmonary:      Effort: Pulmonary effort is normal. No respiratory distress. Breath sounds: Normal breath sounds. No wheezing or rales. Chest:      Chest wall: No tenderness. Abdominal:      General: Bowel sounds are normal. There is no distension. Palpations: Abdomen is soft. Tenderness: There is no abdominal tenderness. There is no right CVA tenderness, left CVA tenderness, guarding or rebound. Musculoskeletal:         General: No tenderness. Normal range of motion. Cervical back: Normal range of motion and neck supple. No tenderness. Right lower leg: No edema. Left lower leg: No edema. Lymphadenopathy:      Cervical: No cervical adenopathy. Skin:     General: Skin is warm and dry. Findings: Rash present. Rash is urticarial.   Neurological:      Mental Status: He is alert and oriented to person, place, and time. Mental status is at baseline. Deep Tendon Reflexes: Reflexes are normal and symmetric.    Psychiatric: Mood and Affect: Mood normal.         Behavior: Behavior normal.         Thought Content:  Thought content normal.         Judgment: Judgment normal.

## 2023-11-07 NOTE — ASSESSMENT & PLAN NOTE
New  Unclear cause  May be 2/2 carafate  Discontinue carafate  Reviewed images of rash  Appear urticarial vs dermatitis  Positive Dermatographia on exam  Start prednisone taper  Avoid hot showers  Follow up as needed

## 2024-01-03 ENCOUNTER — OFFICE VISIT (OUTPATIENT)
Dept: GASTROENTEROLOGY | Facility: MEDICAL CENTER | Age: 24
End: 2024-01-03
Payer: COMMERCIAL

## 2024-01-03 VITALS
HEIGHT: 69 IN | TEMPERATURE: 98.1 F | SYSTOLIC BLOOD PRESSURE: 102 MMHG | DIASTOLIC BLOOD PRESSURE: 64 MMHG | WEIGHT: 131.6 LBS | HEART RATE: 62 BPM | BODY MASS INDEX: 19.49 KG/M2

## 2024-01-03 DIAGNOSIS — K30 FUNCTIONAL DYSPEPSIA: ICD-10-CM

## 2024-01-03 DIAGNOSIS — R12 HEARTBURN: ICD-10-CM

## 2024-01-03 DIAGNOSIS — R10.13 EPIGASTRIC PAIN: ICD-10-CM

## 2024-01-03 DIAGNOSIS — R11.2 NAUSEA AND VOMITING, UNSPECIFIED VOMITING TYPE: Primary | ICD-10-CM

## 2024-01-03 DIAGNOSIS — R11.15 CYCLICAL VOMITING SYNDROME: ICD-10-CM

## 2024-01-03 PROCEDURE — 99214 OFFICE O/P EST MOD 30 MIN: CPT | Performed by: STUDENT IN AN ORGANIZED HEALTH CARE EDUCATION/TRAINING PROGRAM

## 2024-01-03 RX ORDER — NORTRIPTYLINE HYDROCHLORIDE 10 MG/1
10 CAPSULE ORAL
Qty: 30 CAPSULE | Refills: 2 | Status: SHIPPED | OUTPATIENT
Start: 2024-01-03

## 2024-01-03 NOTE — PROGRESS NOTES
[facility] - Outpatient Consultation  Kirk Alexander 23 y.o. male MRN: [id number]  Encounter: [id number]      Assessment and Plan:    1. Nausea and vomiting, unspecified vomiting type    2. Functional dyspepsia    3. Cyclical vomiting syndrome    4. Epigastric pain    5. Heartburn        23 y.o. male w/ hx of GERD, depression, anxiety, panic attacks, chronic marijuana use who presents for follow-up of GERD, epigastric pain, nausea/vomiting.      Work-up including EGD has been largely unrevealing despite ongoing severe symptoms. He has been off marijuana since our last visit 10/2023, and I encouraged him to continue abstinence.    At this point, I strongly suspect cyclical vomiting syndrome vs functional dyspepsia. I suggested a trial of TCA, especially since he is not actually taking the majority of his listed psych meds (only taking Remeron regularly). Interestingly, the Remeron seems to temporarily alleviate his stomach issues.    Given that PPI seems to have no effect on his pain or GERD, he could also have functional heartburn. In the future if TCA does not improve his symptoms, we could consider a Bravo.    - Start nortriptyline 10 mg nightly. I asked him to let his psychiatrist know  - Recommend completing the H.pylori stool Ag (after stopping PPI for 2 weeks) since the recent EGD was done without holding PPI  - Abstinence from marijuana    RTC in 2-3 months    No orders of the defined types were placed in this encounter.    ______________________________________________________________________    History of Present Illness:    Kirk Alexander is a 23 y.o. male w/ hx of GERD, depression, anxiety, panic attacks, chronic marijuana use who presents for follow-up of GERD, epigastric pain, nausea/vomiting.      Patient was evaluated in the GI office in 2021 for epigastric pain not improved with H2RA and PPI.  EGD was ordered but not completed.  Patient states that he was feeling a  "bit better so decided to cancel. That being said, he notes mild but persistent, chronic epigastric pain, GERD, and nausea over the last 2 years.    In the 8/2023, he had acute worsening of symptoms including multiple episodes of vomiting and inability to keep any food down which prompted several ER visits in 9/2023 (some of which are more related to anxiety and panic attacks).  He was restarted on omeprazole 40 mg daily which seemed to make no difference.    He endorsed smoking marijuana every few hours for the last 3 years.     EGD 10/2023 showed a small hiatal hernia, Schatzki ring s/p 20 Fr Key dilation (neg EoE, H.pylori, and celiac).    Today, patient states that he can have around 2 days of feeling normal, but he will have a flare also lasting several days. He has stopped marijuana since our last visit 10/2023. He has not had vomiting anymore, but he has had more prominent epigastric pain. He take Remeron which seems to \"numb\" his stomach and allow him to eat better. He is not taking any of his other psych meds on his list. He also has frequent heartburn not improved with PPI.      Review of Systems:  As per HPI. Otherwise negative.      Historical Information   Past Medical History:   Diagnosis Date    Anxiety     GERD (gastroesophageal reflux disease)      History reviewed. No pertinent surgical history.  Social History   Social History     Substance and Sexual Activity   Alcohol Use Not Currently     Social History     Substance and Sexual Activity   Drug Use Not Currently    Types: Marijuana    Comment: last time a month ago     Social History     Tobacco Use   Smoking Status Never   Smokeless Tobacco Never     Family History   Problem Relation Age of Onset    Drug abuse Maternal Aunt     Alcohol abuse Maternal Aunt     Anxiety disorder Paternal Grandmother     Completed Suicide  Cousin        Meds/Allergies       Current Outpatient Medications:     busPIRone (BUSPAR) 5 mg tablet    cetirizine (ZyrTEC) " "10 mg tablet    LORazepam (Ativan) 1 mg tablet    mirtazapine (REMERON) 30 mg tablet    nortriptyline (PAMELOR) 10 mg capsule    busPIRone (BUSPAR) 15 mg tablet    fluticasone (FLONASE) 50 mcg/act nasal spray    hydrOXYzine pamoate (VISTARIL) 50 mg capsule    naproxen (Naprosyn) 500 mg tablet    ondansetron (Zofran ODT) 4 mg disintegrating tablet    sertraline (ZOLOFT) 100 mg tablet    sertraline (Zoloft) 50 mg tablet    No Known Allergies        Objective     Blood pressure 102/64, pulse 62, temperature 98.1 °F (36.7 °C), temperature source Tympanic, height 5' 9\" (1.753 m), weight 59.7 kg (131 lb 9.6 oz). Body mass index is 19.43 kg/m².        Physical Exam:      General: No acute distress  Abdomen: Soft, moderate epigastric tenderness, non-distended, normoactive bowel sounds  Extremities: No lower extremity edema  Neuro: Awake, alert, oriented x 3    Lab Results:   Lab Results   Component Value Date/Time    WBC 6.50 09/22/2023 01:04 PM    HGB 16.0 09/22/2023 01:04 PM     09/22/2023 01:04 PM    SODIUM 141 09/22/2023 01:04 PM    K 3.0 (L) 09/22/2023 01:04 PM     09/22/2023 01:04 PM    CO2 21 09/22/2023 01:04 PM    BUN 12 09/22/2023 01:04 PM    CREATININE 0.89 09/22/2023 01:04 PM    AST 19 09/22/2023 01:04 PM    ALT 30 09/22/2023 01:04 PM    ALKPHOS 59 09/22/2023 01:04 PM    TBILI 1.06 (H) 09/22/2023 01:04 PM    ALB 4.7 09/22/2023 01:04 PM    INR 0.97 05/15/2020 05:24 AM    LIPASE 6 (L) 09/22/2023 01:04 PM       "

## 2024-01-19 ENCOUNTER — TELEMEDICINE (OUTPATIENT)
Dept: FAMILY MEDICINE CLINIC | Facility: CLINIC | Age: 24
End: 2024-01-19
Payer: COMMERCIAL

## 2024-01-19 DIAGNOSIS — F51.04 PSYCHOPHYSIOLOGICAL INSOMNIA: ICD-10-CM

## 2024-01-19 DIAGNOSIS — F41.1 GENERALIZED ANXIETY DISORDER: ICD-10-CM

## 2024-01-19 DIAGNOSIS — L50.3 DERMATOGRAPHIC URTICARIA: ICD-10-CM

## 2024-01-19 DIAGNOSIS — L20.9 ATOPIC DERMATITIS, UNSPECIFIED TYPE: Primary | ICD-10-CM

## 2024-01-19 PROCEDURE — 99214 OFFICE O/P EST MOD 30 MIN: CPT | Performed by: FAMILY MEDICINE

## 2024-01-19 RX ORDER — TRIAMCINOLONE ACETONIDE 1 MG/G
CREAM TOPICAL 2 TIMES DAILY PRN
Qty: 45 G | Refills: 1 | Status: SHIPPED | OUTPATIENT
Start: 2024-01-19

## 2024-01-19 NOTE — ASSESSMENT & PLAN NOTE
Recurrent  Patient reports repeat rash and dermatographia when scratched by his dog  Previous flare up resolved with prednisone taper  Continue oatmeal bathes  May use vistaril for itch  Kenalog 0.1% cream to be used as needed for dermatographia  Recommend avoidance of triggers  Referral to allergy immunology for more in depth allergy testing  Follow up as needed

## 2024-01-19 NOTE — PROGRESS NOTES
Virtual Regular Visit    Verification of patient location:    Patient is located at Home in the following state in which I hold an active license [place]      Assessment/Plan:    Problem List Items Addressed This Visit        Musculoskeletal and Integument    Atopic dermatitis - Primary     Recurrent  Patient reports repeat rash and dermatographia when scratched by his dog  Previous flare up resolved with prednisone taper  Continue oatmeal bathes  May use vistaril for itch  Kenalog 0.1% cream to be used as needed for dermatographia  Recommend avoidance of triggers  Referral to allergy immunology for more in depth allergy testing  Follow up as needed         Relevant Medications    triamcinolone (KENALOG) 0.1 % cream    Other Relevant Orders    Ambulatory Referral to Allergy    Dermatographic urticaria    Relevant Medications    triamcinolone (KENALOG) 0.1 % cream    Other Relevant Orders    Ambulatory Referral to Allergy       Other    Insomnia     Much improved since starting remeron 30mg daily  Under the care of psych         Generalized anxiety disorder     Stable  No longer on zoloft  Continue buspar  Under the care of psych                 Reason for visit is   Chief Complaint   Patient presents with   • Virtual Regular Visit          Encounter provider Juventino aMe DO    Provider located at [address] [place] [address]      Recent Visits  No visits were found meeting these conditions.  Showing recent visits within past 7 days and meeting all other requirements  Today's Visits  Date Type Provider Dept   01/19/24 Telemedicine Juventino Mae DO [facility] [place]   Showing today's visits and meeting all other requirements  Future Appointments  No visits were found meeting these conditions.  Showing future appointments within next 150 days and meeting all other requirements       The patient was  identified by name and date of birth. Kirk Alexander was informed that this is a telemedicine visit and that the visit is being conducted through the [employer] platform. He agrees to proceed..  My office door was closed. No one else was in the room.  He acknowledged consent and understanding of privacy and security of the video platform. The patient has agreed to participate and understands they can discontinue the visit at any time.    Patient is aware this is a billable service.     Subjective  Kirk Alexander is a 23 y.o. male presents today for rash.  Onset 3 days  Is recurrent  Recently started nortriptyline 10mg and had a new pillow.   The rash comes and goes.   Has been bathing in oatmeal bathes which helps with the itch        HPI     Past Medical History:   Diagnosis Date   • Anxiety    • GERD (gastroesophageal reflux disease)        No past surgical history on file.    Current Outpatient Medications   Medication Sig Dispense Refill   • hydrOXYzine pamoate (VISTARIL) 50 mg capsule take 1 capsule by mouth twice a day if needed for anxiety     • triamcinolone (KENALOG) 0.1 % cream Apply topically 2 (two) times a day as needed for rash 45 g 1   • busPIRone (BUSPAR) 15 mg tablet Take 15 mg by mouth 2 (two) times a day (Patient not taking: Reported on 9/20/2023)     • busPIRone (BUSPAR) 5 mg tablet Take 2 tablets (10 mg total) by mouth 2 (two) times a day 180 tablet 3   • cetirizine (ZyrTEC) 10 mg tablet Take 1 tablet (10 mg total) by mouth daily 90 tablet 1   • fluticasone (FLONASE) 50 mcg/act nasal spray 2 sprays into each nostril daily (Patient not taking: Reported on 9/20/2023) 18.2 mL 2   • mirtazapine (REMERON) 30 mg tablet Take 1 tablet (30 mg total) by mouth every evening 90 tablet 0   • nortriptyline (PAMELOR) 10 mg capsule Take 1 capsule (10 mg total) by mouth daily at bedtime 30 capsule 2     No current facility-administered medications for this visit.        No Known Allergies    Review of  Systems   Constitutional:  Negative for activity change, chills, diaphoresis and fever.   HENT:  Negative for ear pain, hearing loss, postnasal drip, rhinorrhea, sinus pressure, sinus pain, sneezing and sore throat.    Respiratory:  Negative for cough, chest tightness, shortness of breath and wheezing.    Cardiovascular:  Negative for chest pain, palpitations and leg swelling.   Gastrointestinal:  Negative for abdominal pain, blood in stool, constipation, diarrhea, nausea and vomiting.   Genitourinary:  Negative for dysuria, frequency, hematuria and urgency.   Musculoskeletal:  Negative for arthralgias and myalgias.   Skin:  Positive for rash.   Neurological:  Negative for dizziness, syncope, weakness, light-headedness, numbness and headaches.       Video Exam    There were no vitals filed for this visit.    Physical Exam  Constitutional:       General: He is not in acute distress.     Appearance: He is normal weight. He is not ill-appearing, toxic-appearing or diaphoretic.   HENT:      Head: Normocephalic and atraumatic.      Nose: Nose normal. No congestion.      Mouth/Throat:      Mouth: Mucous membranes are moist.      Pharynx: Oropharynx is clear.   Eyes:      General: No scleral icterus.        Right eye: No discharge.         Left eye: No discharge.   Pulmonary:      Effort: No respiratory distress.   Skin:     Coloration: Skin is not pale.      Findings: No erythema.   Neurological:      Mental Status: He is alert. Mental status is at baseline.   Psychiatric:         Mood and Affect: Mood normal.         Thought Content: Thought content normal.          Visit Time  Total Visit Duration: 20

## 2024-01-27 DIAGNOSIS — K30 FUNCTIONAL DYSPEPSIA: ICD-10-CM

## 2024-01-27 DIAGNOSIS — R11.15 CYCLICAL VOMITING SYNDROME: ICD-10-CM

## 2024-01-29 RX ORDER — NORTRIPTYLINE HYDROCHLORIDE 10 MG/1
10 CAPSULE ORAL
Qty: 90 CAPSULE | Refills: 1 | Status: SHIPPED | OUTPATIENT
Start: 2024-01-29

## 2024-02-20 ENCOUNTER — TELEPHONE (OUTPATIENT)
Age: 24
End: 2024-02-20

## 2024-02-20 NOTE — TELEPHONE ENCOUNTER
Patient called the RX Refill Line. Message is being forwarded to the office.     Patient is requesting a callback regarding rash. Patient was warm transferred to me from the office.States he was seen on 11/7/23 for atopic dermatitis, he was given prednisone 10 mg with improvement of symptoms. On 1/19/24 patient was prescribed the triamcinolone (KENALOG) 0.1 % cream with some improvement. States his rash is back with no improvement. Reports rash is very itchy. Patient reports he has been unable to schedule an appointment with the allergist due to transportation issues. Patient asking for another round of prednisone, if possible.     Please review    Please contact patient at [phone]

## 2024-02-20 NOTE — TELEPHONE ENCOUNTER
Pt mother called in requesting a number for the Dermatologist that Dr. Jauregui referred patient to. Informed mother it was to Dr. Gabe Camacho ph# [phone]. No further questions or concerns.

## 2024-02-20 NOTE — TELEPHONE ENCOUNTER
I can send in the cream but if he is requesting prednisone prescription it needs to be evaluated.

## 2024-02-21 ENCOUNTER — OFFICE VISIT (OUTPATIENT)
Dept: GASTROENTEROLOGY | Facility: CLINIC | Age: 24
End: 2024-02-21
Payer: COMMERCIAL

## 2024-02-21 VITALS
HEART RATE: 101 BPM | DIASTOLIC BLOOD PRESSURE: 71 MMHG | WEIGHT: 130 LBS | OXYGEN SATURATION: 99 % | HEIGHT: 69 IN | TEMPERATURE: 97.6 F | SYSTOLIC BLOOD PRESSURE: 113 MMHG | BODY MASS INDEX: 19.26 KG/M2

## 2024-02-21 DIAGNOSIS — R11.2 NAUSEA AND VOMITING, UNSPECIFIED VOMITING TYPE: ICD-10-CM

## 2024-02-21 DIAGNOSIS — R10.13 EPIGASTRIC PAIN: Primary | ICD-10-CM

## 2024-02-21 DIAGNOSIS — K21.9 GASTROESOPHAGEAL REFLUX DISEASE WITHOUT ESOPHAGITIS: ICD-10-CM

## 2024-02-21 DIAGNOSIS — R19.7 DIARRHEA, UNSPECIFIED TYPE: ICD-10-CM

## 2024-02-21 DIAGNOSIS — R19.4 CHANGE IN BOWEL HABITS: ICD-10-CM

## 2024-02-21 PROCEDURE — 99214 OFFICE O/P EST MOD 30 MIN: CPT | Performed by: DIETITIAN, REGISTERED

## 2024-02-21 RX ORDER — NORTRIPTYLINE HYDROCHLORIDE 25 MG/1
25 CAPSULE ORAL
Qty: 90 CAPSULE | Refills: 3 | Status: SHIPPED | OUTPATIENT
Start: 2024-02-21

## 2024-02-21 NOTE — PROGRESS NOTES
[facility] - Outpatient Follow-up Note  Kirk Alexander 23 y.o. male MRN: [id number]  Encounter: [id number]          ASSESSMENT AND PLAN:    1.  Epigastric pain  2.  Nausea and vomiting  3.  GERD without esophagitis  Patient with longstanding history of epigastric pain, nausea, vomiting, and dyspepsia not improved with H2RA or PPI.  EGD 10/2023 showed a small hiatal hernia, Schatzki ring status post 20 [place] dilation, with biopsies negative for EOE, H. pylori, and celiac.  Patient has history of significant marijuana use every few hours for 3 years.  Over the last 4 months, he had stopped marijuana completely.  Vomiting improved but epigastric pain, nausea, and heartburn/reflux have worsened.  Abdominal pain is epigastric, described as stabbing/burning, and worsens with all food/drink.  Has tried nortriptyline 10 mg nightly with no side effects but no benefits.  He has restarted marijuana use for the last 2 weeks as this gives him immediate relief from his GI symptoms.    -Restart nortriptyline at 25 mg nightly.  Discussed possible side effects including drowsiness.  Advised patient to call the office if no improvement in symptoms and we can consider dose increase to 50 mg nightly.  -Advised abstinence from marijuana as unfortunately, if CHS is playing a role in his symptoms, immediate relief from using marijuana is common, but prolongs the cycle of symptoms.  -Check RUQ US to rule out biliary etiology of symptoms.  -Check H. pylori stool antigen.      4.  Change in bowel habits  5.  Diarrhea  He also reports a change in his bowel habits over the past month with [place] #6-7 loose bowel movements 2-3 times a day, usually after eating.  At baseline he had a bowel movement every other day.  Denies any black or bloody bowel movements or rectal bleeding.  Sometimes has black stools if taking Pepto-Bismol.    -Check infectious stool tests including enteric pathogen PCR, C.  difficile, Giardia, O&P.      Follow up 2 months, can call for sooner visit if needed.    __________________________________________________________    SUBJECTIVE:  Kirk Alexander is a 23-year-old male with history of prediabetes, GERD, gastritis, and anxiety/depression who presents for follow-up evaluation of abdominal pain.      Patient was previously evaluated by GI in 2021 for epigastric pain not improved with H2RA or PPI.  EGD was ordered but not completed as patient felt somewhat better.  In August 2023, he had acute worsening of his symptoms with epigastric pain, nausea and vomiting.  He was restarted on omeprazole daily which did not help.  He endorsed using marijuana every few hours for the last 3 years.  EGD 10/2023 showed a small hiatal hernia, Schatzki ring status post 20 [place] dilation, with biopsies negative for EOE, H. pylori, and celiac.  Patient stopped using marijuana in 10/2023.  Vomiting had resolved but epigastric pain had become more prominent with frequent heartburn not improved with PPI.  Last office visit 1/3/2024.  At that time, patient was encouraged to continue abstinence from marijuana and was started on nortriptyline 10 mg nightly.  He was also encouraged to submit his H. pylori stool antigen test, but this has not been completed yet.    Patient reports his upper GI symptoms are worsening over the last few weeks.  Upper abdominal pain is constant, worsens with every food or drink.  Abdominal pain is stabbing, sometimes burning.  Also has chronic nausea, heartburn, reflux which worsened after meals as well.  Has had 2 episodes of vomiting since his last office visit in the last month.  He had previously stopped using marijuana for about 4 months but symptoms seem to be worsening so he does report over the last 2 weeks he has been smoking again throughout the day every day.  He reports smoking gives him immediate relief.  Also reports Pepto-Bismol is somewhat helpful.  He  previously used nortriptyline 10 mg before bed for about 5 to 6 weeks with no side effects but also no improvement in his symptoms.    He also reports a change in his bowel habits over the past month with [place] #6-7 loose bowel movements 2-3 times a day, usually after eating.  At baseline he had a bowel movement every other day.  Denies any black or bloody bowel movements or rectal bleeding.  Sometimes has black stools if taking Pepto-Bismol.      REVIEW OF SYSTEMS:  10 point ROS reviewed and negative, except as above      Historical Information   Past Medical History:   Diagnosis Date    Anxiety     GERD (gastroesophageal reflux disease)      No past surgical history on file.  Social History   Social History     Substance and Sexual Activity   Alcohol Use Not Currently     Social History     Substance and Sexual Activity   Drug Use Not Currently    Types: Marijuana    Comment: last time a month ago     Social History     Tobacco Use   Smoking Status Never   Smokeless Tobacco Never     Family History   Problem Relation Age of Onset    Drug abuse Maternal Aunt     Alcohol abuse Maternal Aunt     Anxiety disorder Paternal Grandmother     Completed Suicide  Cousin        Meds/Allergies       Current Outpatient Medications:     busPIRone (BUSPAR) 15 mg tablet    busPIRone (BUSPAR) 5 mg tablet    cetirizine (ZyrTEC) 10 mg tablet    fluticasone (FLONASE) 50 mcg/act nasal spray    hydrOXYzine pamoate (VISTARIL) 50 mg capsule    mirtazapine (REMERON) 30 mg tablet    nortriptyline (PAMELOR) 10 mg capsule    triamcinolone (KENALOG) 0.1 % cream    No Known Allergies        Objective     Wt Readings from Last 3 Encounters:   01/03/24 59.7 kg (131 lb 9.6 oz)   11/07/23 61.1 kg (134 lb 9.6 oz)   10/31/23 61 kg (134 lb 6.4 oz)     Temp Readings from Last 3 Encounters:   01/03/24 98.1 °F (36.7 °C) (Tympanic)   11/07/23 98.7 °F (37.1 °C) (Temporal)   11/02/23 98.2 °F (36.8 °C)     BP Readings from Last 3 Encounters:   01/03/24  102/64   11/07/23 112/80   11/02/23 124/74     Pulse Readings from Last 3 Encounters:   01/03/24 62   11/07/23 57   11/02/23 67          PHYSICAL EXAM:      Physical Exam     Lab Results:   No visits with results within 1 Day(s) from this visit.   Latest known visit with results is:   Hospital Outpatient Visit on 10/23/2023   Component Date Value    Case Report 10/23/2023                      Value:Surgical Pathology Report                         Case: [id number]                                   Authorizing Provider:  Cristofer Steel MD             Collected:           10/23/2023 1215              Ordering Location:     [facility] Received:            10/23/2023 1420                                     [facility] End[facility]                                                              Pathologist:           Chaya Mane MD                                                         Specimens:   A) - Duodenum, r/o celiac dx; for epigastric pain                                                   B) - Stomach, r/o h pylori                                                                          C) - Esophagus, proximal r/o eoe                                                           Final Diagnosis 10/23/2023                      Value:This result contains rich text formatting which cannot be displayed here.    Additional Information 10/23/2023                      Value:This result contains rich text formatting which cannot be displayed here.    Gross Description 10/23/2023                      Value:This result contains rich text formatting which cannot be displayed here.       Lab Results   Component Value Date    WBC 6.50 09/22/2023    HGB 16.0 09/22/2023    HCT 46.9 09/22/2023    MCV 87 09/22/2023     09/22/2023       Lab Results   Component Value Date    SODIUM 141 09/22/2023    K 3.0 (L) 09/22/2023     09/22/2023    CO2 21 09/22/2023    AGAP 13 09/22/2023    BUN 12 09/22/2023    CREATININE  0.89 09/22/2023    GLUC 123 09/22/2023    GLUF 104 (H) 07/18/2023    CALCIUM 9.7 09/22/2023    AST 19 09/22/2023    ALT 30 09/22/2023    ALKPHOS 59 09/22/2023    TP 7.9 09/22/2023    TBILI 1.06 (H) 09/22/2023    EGFR 121 09/22/2023         Radiology Results:   No results found.

## 2024-05-02 ENCOUNTER — OFFICE VISIT (OUTPATIENT)
Dept: FAMILY MEDICINE CLINIC | Facility: CLINIC | Age: 24
End: 2024-05-02

## 2024-05-02 VITALS
WEIGHT: 119 LBS | BODY MASS INDEX: 17.63 KG/M2 | OXYGEN SATURATION: 98 % | DIASTOLIC BLOOD PRESSURE: 78 MMHG | HEIGHT: 69 IN | SYSTOLIC BLOOD PRESSURE: 100 MMHG | TEMPERATURE: 98 F | HEART RATE: 63 BPM

## 2024-05-02 DIAGNOSIS — F33.2 MAJOR DEPRESSIVE DISORDER, RECURRENT SEVERE WITHOUT PSYCHOTIC FEATURES (HCC): ICD-10-CM

## 2024-05-02 DIAGNOSIS — K29.50 CHRONIC GASTRITIS WITHOUT BLEEDING, UNSPECIFIED GASTRITIS TYPE: ICD-10-CM

## 2024-05-02 DIAGNOSIS — K21.9 GASTROESOPHAGEAL REFLUX DISEASE WITHOUT ESOPHAGITIS: ICD-10-CM

## 2024-05-02 DIAGNOSIS — Z00.00 ANNUAL PHYSICAL EXAM: Primary | ICD-10-CM

## 2024-05-02 DIAGNOSIS — R55 VASOVAGAL SYNCOPE: Chronic | ICD-10-CM

## 2024-05-02 DIAGNOSIS — E87.6 HYPOKALEMIA: ICD-10-CM

## 2024-05-02 DIAGNOSIS — L50.3 DERMATOGRAPHIC URTICARIA: ICD-10-CM

## 2024-05-02 DIAGNOSIS — J30.2 SEASONAL ALLERGIES: ICD-10-CM

## 2024-05-02 DIAGNOSIS — R73.03 PREDIABETES: ICD-10-CM

## 2024-05-02 RX ORDER — DEXTROAMPHETAMINE SACCHARATE, AMPHETAMINE ASPARTATE MONOHYDRATE, DEXTROAMPHETAMINE SULFATE AND AMPHETAMINE SULFATE 3.75; 3.75; 3.75; 3.75 MG/1; MG/1; MG/1; MG/1
CAPSULE, EXTENDED RELEASE ORAL
COMMUNITY
Start: 2024-04-26

## 2024-05-02 RX ORDER — MIRTAZAPINE 15 MG/1
15 TABLET, FILM COATED ORAL
COMMUNITY
Start: 2024-04-26

## 2024-05-02 NOTE — PROGRESS NOTES
ADULT ANNUAL PHYSICAL  [facility]    NAME: Kirk Alexander  AGE: 23 y.o. SEX: male  : 2000     DATE: 5/3/2024     Assessment and Plan:     Problem List Items Addressed This Visit        Digestive    Gastroesophageal reflux disease without esophagitis    Chronic gastritis without bleeding     Waxes and wanes  Under the care of gastroenterology  Previously on omeprazole  Symptoms are usually controlled with dietary modifications and avoidance of late-night eating  Patient is now taking nortriptyline 25 mg daily which is helping his symptoms            Musculoskeletal and Integument    Dermatographic urticaria     Stable  Does not complain of new onset rashes            Behavioral Health    Major depressive disorder, recurrent severe without psychotic features (HCC)     Under the care of of psychiatry  Remains on sertraline 50 mg and Remeron 15 mg  Patient takes hydroxyzine as needed for acute anxiety  Continues to smoke medical marijuana daily.  Patient is unclear how much he is using.  I recommend he monitor and calculate the dose because although low doses may help with mental health, high doses can exacerbate it.  Patient reports sometimes feeling of fear out of taking medications daily.  Discussed with patient that his mental health is just as important as a physical health and these medications help balance the neurotransmitters in his brain and to keep it stable.  Patient is understandable and will be compliant with his medications  Denies suicidal thoughts or ideations         Relevant Medications    sertraline (ZOLOFT) 50 mg tablet    amphetamine-dextroamphetamine (ADDERALL XR) 15 MG 24 hr capsule    mirtazapine (REMERON) 15 mg tablet       Neurology/Sleep    Vasovagal syncope (Chronic)     Recurrent  Had 2 episodes in the past month.  Episodes are identical.  He is going to get food in the kitchen.  Immediately feels hot and  diaphoretic.  Develops tunnel vision and wakes up on the floor.  Did not complete echo or holter, referral was reprinted.  Encourage limiting his marijuana usage and staying well-hydrated         Relevant Orders    Comprehensive metabolic panel    TSH, 3rd generation       Other    Seasonal allergies     Remains on Flonase and Zyrtec as needed         Prediabetes     Able  Continue to monitor        Other Visit Diagnoses     Annual physical exam    -  Primary    Hypokalemia        Relevant Orders    Comprehensive metabolic panel            Immunizations and preventive care screenings were discussed with patient today. Appropriate education was printed on patient's after visit summary.    Counseling:  Alcohol/drug use: discussed moderation in alcohol intake, the recommendations for healthy alcohol use, and avoidance of illicit drug use.  Dental Health: discussed importance of regular tooth brushing, flossing, and dental visits.  Injury prevention: discussed safety/seat belts, safety helmets, smoke detectors, carbon dioxide detectors, and smoking near bedding or upholstery.  Sexual health: discussed sexually transmitted diseases, partner selection, use of condoms, avoidance of unintended pregnancy, and contraceptive alternatives.  Exercise: the importance of regular exercise/physical activity was discussed. Recommend exercise 3-5 times per week for at least 30 minutes.          Return in about 6 months (around 11/2/2024) for Recheck.     Chief Complaint:     Chief Complaint   Patient presents with   • Annual Exam      History of Present Illness:     Adult Annual Physical   Patient here for a comprehensive physical exam. The patient reports passing out a twice in the last month. He did not complete the holter or echo that I recommended    Since her last visit patient had evaluation by gastroenterology and started on nortriptyline 25 mg at night.    Diet and Physical Activity  Diet/Nutrition: well balanced diet and  consuming 3-5 servings of fruits/vegetables daily.   Exercise: no formal exercise.      Depression Screening  PHQ-2/9 Depression Screening    Little interest or pleasure in doing things: 3 - nearly every day  Feeling down, depressed, or hopeless: 3 - nearly every day  Trouble falling or staying asleep, or sleeping too much: 0 - not at all  Feeling tired or having little energy: 3 - nearly every day  Poor appetite or overeatin - not at all  Feeling bad about yourself - or that you are a failure or have let yourself or your family down: 3 - nearly every day  Trouble concentrating on things, such as reading the newspaper or watching television: 3 - nearly every day  Moving or speaking so slowly that other people could have noticed. Or the opposite - being so fidgety or restless that you have been moving around a lot more than usual: 0 - not at all  Thoughts that you would be better off dead, or of hurting yourself in some way: 0 - not at all  PHQ-9 Score: 15  PHQ-9 Interpretation: Moderately severe depression       General Health  Sleep: sleeps well.   Hearing: normal - bilateral.  Vision: most recent eye exam >1 year ago and wears glasses.   Dental: no dental visits for >1 year and brushes teeth once daily.        Health  History of STDs?: no.       Review of Systems:     Review of Systems   Constitutional:  Negative for activity change, chills, diaphoresis and fever.   HENT:  Negative for ear pain, hearing loss, postnasal drip, rhinorrhea, sinus pressure, sinus pain, sneezing and sore throat.    Respiratory:  Negative for cough, chest tightness, shortness of breath and wheezing.    Cardiovascular:  Negative for chest pain, palpitations and leg swelling.   Gastrointestinal:  Negative for abdominal pain, blood in stool, constipation, diarrhea, nausea and vomiting.   Genitourinary:  Negative for dysuria, frequency, hematuria and urgency.   Musculoskeletal:  Negative for arthralgias and myalgias.   Neurological:   Negative for dizziness, syncope, weakness, light-headedness, numbness and headaches.      Past Medical History:     Past Medical History:   Diagnosis Date   • Anxiety    • GERD (gastroesophageal reflux disease)       Past Surgical History:     History reviewed. No pertinent surgical history.   Social History:     Social History     Socioeconomic History   • Marital status: Single     Spouse name: None   • Number of children: None   • Years of education: None   • Highest education level: 10th grade   Occupational History   • None   Tobacco Use   • Smoking status: Never   • Smokeless tobacco: Never   Vaping Use   • Vaping status: Never Used   Substance and Sexual Activity   • Alcohol use: Not Currently   • Drug use: Not Currently     Types: Marijuana     Comment: last time a month ago   • Sexual activity: Yes     Partners: Female     Birth control/protection: Condom Male   Other Topics Concern   • None   Social History Narrative   • None     Social Determinants of Health     Financial Resource Strain: Not on file   Food Insecurity: Not on file   Transportation Needs: Not on file   Physical Activity: Not on file   Stress: Not on file   Social Connections: Not on file   Intimate Partner Violence: Not on file   Housing Stability: Not on file      Family History:     Family History   Problem Relation Age of Onset   • Drug abuse Maternal Aunt    • Alcohol abuse Maternal Aunt    • Anxiety disorder Paternal Grandmother    • Completed Suicide  Cousin       Current Medications:     Current Outpatient Medications   Medication Sig Dispense Refill   • amphetamine-dextroamphetamine (ADDERALL XR) 15 MG 24 hr capsule take 1 capsule by mouth every day in the morning     • hydrOXYzine pamoate (VISTARIL) 50 mg capsule take 1 capsule by mouth twice a day if needed for anxiety     • mirtazapine (REMERON) 15 mg tablet Take 15 mg by mouth daily at bedtime     • nortriptyline (PAMELOR) 25 mg capsule Take 1 capsule (25 mg total) by mouth  "daily at bedtime 90 capsule 3   • sertraline (ZOLOFT) 50 mg tablet Take 50 mg by mouth daily     • triamcinolone (KENALOG) 0.1 % cream Apply topically 2 (two) times a day as needed for rash 45 g 1   • cetirizine (ZyrTEC) 10 mg tablet Take 1 tablet (10 mg total) by mouth daily (Patient not taking: Reported on 2/21/2024) 90 tablet 1   • fluticasone (FLONASE) 50 mcg/act nasal spray 2 sprays into each nostril daily (Patient not taking: Reported on 9/20/2023) 18.2 mL 2     No current facility-administered medications for this visit.      Allergies:     No Known Allergies   Physical Exam:     /78 (BP Location: Left arm, Patient Position: Sitting, Cuff Size: Adult)   Pulse 63   Temp 98 °F (36.7 °C)   Ht 5' 9\" (1.753 m)   Wt 54 kg (119 lb)   SpO2 98%   BMI 17.57 kg/m²     Physical Exam  Vitals reviewed.   Constitutional:       General: He is not in acute distress.     Appearance: He is well-developed. He is not diaphoretic.   HENT:      Head: Normocephalic and atraumatic.      Right Ear: Tympanic membrane, ear canal and external ear normal. There is no impacted cerumen.      Left Ear: Tympanic membrane, ear canal and external ear normal. There is no impacted cerumen.      Nose: Nose normal. No congestion.      Mouth/Throat:      Mouth: Mucous membranes are moist.      Pharynx: Oropharynx is clear.   Eyes:      General: No scleral icterus.        Right eye: No discharge.         Left eye: No discharge.      Conjunctiva/sclera: Conjunctivae normal.      Pupils: Pupils are equal, round, and reactive to light.   Neck:      Vascular: No JVD.   Cardiovascular:      Rate and Rhythm: Normal rate and regular rhythm.      Heart sounds: Normal heart sounds. No murmur heard.     No friction rub.   Pulmonary:      Effort: Pulmonary effort is normal. No respiratory distress.      Breath sounds: Normal breath sounds. No wheezing or rales.   Chest:      Chest wall: No tenderness.   Abdominal:      General: Bowel sounds are " normal. There is no distension.      Palpations: Abdomen is soft. There is no mass.      Tenderness: There is no abdominal tenderness. There is no guarding or rebound.   Musculoskeletal:         General: No tenderness or deformity. Normal range of motion.      Cervical back: Normal range of motion and neck supple.   Skin:     General: Skin is warm and dry.      Findings: No erythema or rash.   Neurological:      Mental Status: He is alert and oriented to person, place, and time. Mental status is at baseline.      Cranial Nerves: No cranial nerve deficit.   Psychiatric:         Mood and Affect: Mood normal.          Juventino Mae DO   [facility]

## 2024-05-02 NOTE — ASSESSMENT & PLAN NOTE
Recurrent  Had 2 episodes in the past month.  Episodes are identical.  He is going to get food in the kitchen.  Immediately feels hot and diaphoretic.  Develops tunnel vision and wakes up on the floor.  Did not complete echo or holter, referral was reprinted.  Encourage limiting his marijuana usage and staying well-hydrated

## 2024-05-03 NOTE — ASSESSMENT & PLAN NOTE
Waxes and wanes  Under the care of gastroenterology  Previously on omeprazole  Symptoms are usually controlled with dietary modifications and avoidance of late-night eating  Patient is now taking nortriptyline 25 mg daily which is helping his symptoms

## 2024-05-03 NOTE — ASSESSMENT & PLAN NOTE
Under the care of of psychiatry  Remains on sertraline 50 mg and Remeron 15 mg  Patient takes hydroxyzine as needed for acute anxiety  Continues to smoke medical marijuana daily.  Patient is unclear how much he is using.  I recommend he monitor and calculate the dose because although low doses may help with mental health, high doses can exacerbate it.  Patient reports sometimes feeling of fear out of taking medications daily.  Discussed with patient that his mental health is just as important as a physical health and these medications help balance the neurotransmitters in his brain and to keep it stable.  Patient is understandable and will be compliant with his medications  Denies suicidal thoughts or ideations

## 2024-08-02 ENCOUNTER — OFFICE VISIT (OUTPATIENT)
Dept: FAMILY MEDICINE CLINIC | Facility: CLINIC | Age: 24
End: 2024-08-02
Payer: COMMERCIAL

## 2024-08-02 VITALS
HEIGHT: 69 IN | WEIGHT: 128 LBS | TEMPERATURE: 97.6 F | BODY MASS INDEX: 18.96 KG/M2 | SYSTOLIC BLOOD PRESSURE: 110 MMHG | HEART RATE: 68 BPM | OXYGEN SATURATION: 96 % | DIASTOLIC BLOOD PRESSURE: 80 MMHG

## 2024-08-02 DIAGNOSIS — K04.7 DENTAL INFECTION: Primary | ICD-10-CM

## 2024-08-02 PROCEDURE — 99213 OFFICE O/P EST LOW 20 MIN: CPT | Performed by: FAMILY MEDICINE

## 2024-08-02 NOTE — ASSESSMENT & PLAN NOTE
Resolved  Received 3 days of penicillin VK in the emergency department  Patient developed generalized pruritus and stopped after 3 days  Itching self resolved  Dental pain also resolves  Has appointment with dentist next week  Diffuse dental disease of top and bottom bilateral molars  Allergy noted in chart  If swelling or pain recurs prior to evaluation by dentist will treat with amoxicillin since patient and mother deny side effects with this in the past.

## 2024-08-02 NOTE — PROGRESS NOTES
Assessment/Plan:      1. Dental infection  Assessment & Plan:  Resolved  Received 3 days of penicillin VK in the emergency department  Patient developed generalized pruritus and stopped after 3 days  Itching self resolved  Dental pain also resolves  Has appointment with dentist next week  Diffuse dental disease of top and bottom bilateral molars  Allergy noted in chart  If swelling or pain recurs prior to evaluation by dentist will treat with amoxicillin since patient and mother deny side effects with this in the past.          Subjective:      Patient ID: Kirk Alexander is a 24 y.o. male was seen in Ed for dental infection. Treated with pen vk and oxycodone.  Oxycodone caused nausea and patient stopped after 1 dose. developed generalized itching and was thought due to penicillin.  Patient stopped penicillin after 3 days and itching resolved.    Today he reports resolution of dental pain. Has no issue with amoxicillin.      HPI    The following portions of the patient's history were reviewed and updated as appropriate: allergies, current medications, past family history, past medical history, past social history, past surgical history, and problem list.    Review of Systems   Constitutional:  Negative for activity change, chills, diaphoresis and fever.   HENT:  Positive for dental problem. Negative for ear pain, hearing loss, postnasal drip, rhinorrhea, sinus pressure, sinus pain, sneezing and sore throat.    Respiratory:  Negative for cough, chest tightness, shortness of breath and wheezing.    Cardiovascular:  Negative for chest pain, palpitations and leg swelling.   Gastrointestinal:  Negative for abdominal pain, blood in stool, constipation, diarrhea, nausea and vomiting.   Genitourinary:  Negative for dysuria, frequency, hematuria and urgency.   Musculoskeletal:  Negative for arthralgias and myalgias.   Neurological:  Negative for dizziness, syncope, weakness, light-headedness, numbness and headaches.  "        Objective:      /80 (BP Location: Left arm, Patient Position: Sitting, Cuff Size: Large)   Pulse 68   Temp 97.6 °F (36.4 °C)   Ht 5' 9\" (1.753 m)   Wt 58.1 kg (128 lb)   SpO2 96%   BMI 18.90 kg/m²          Physical Exam  Vitals reviewed.   Constitutional:       General: He is not in acute distress.     Appearance: He is well-developed. He is not diaphoretic.   HENT:      Head: Normocephalic and atraumatic.      Right Ear: External ear normal.      Left Ear: External ear normal.      Nose: Nose normal. No congestion.      Mouth/Throat:      Mouth: Mucous membranes are moist.      Dentition: Abnormal dentition. Dental caries present. No dental tenderness, gingival swelling or dental abscesses.      Pharynx: Oropharynx is clear. No oropharyngeal exudate.   Eyes:      General: No scleral icterus.     Pupils: Pupils are equal, round, and reactive to light.   Neck:      Thyroid: No thyromegaly.      Vascular: No JVD.      Trachea: No tracheal deviation.   Cardiovascular:      Rate and Rhythm: Normal rate and regular rhythm.      Pulses: Normal pulses.      Heart sounds: Normal heart sounds. No murmur heard.     No friction rub.   Pulmonary:      Effort: Pulmonary effort is normal. No respiratory distress.      Breath sounds: Normal breath sounds. No wheezing or rales.   Chest:      Chest wall: No tenderness.   Abdominal:      General: Bowel sounds are normal. There is no distension.      Palpations: Abdomen is soft.      Tenderness: There is no abdominal tenderness. There is no right CVA tenderness, left CVA tenderness, guarding or rebound.   Musculoskeletal:         General: No tenderness. Normal range of motion.      Cervical back: Normal range of motion and neck supple. No tenderness.      Right lower leg: No edema.      Left lower leg: No edema.   Lymphadenopathy:      Cervical: No cervical adenopathy.   Skin:     General: Skin is warm and dry.   Neurological:      Mental Status: He is alert and " oriented to person, place, and time. Mental status is at baseline.      Deep Tendon Reflexes: Reflexes are normal and symmetric.   Psychiatric:         Mood and Affect: Mood normal.         Behavior: Behavior normal.         Thought Content: Thought content normal.         Judgment: Judgment normal.

## 2024-11-01 ENCOUNTER — TELEMEDICINE (OUTPATIENT)
Dept: FAMILY MEDICINE CLINIC | Facility: CLINIC | Age: 24
End: 2024-11-01
Payer: COMMERCIAL

## 2024-11-01 DIAGNOSIS — K21.9 GASTROESOPHAGEAL REFLUX DISEASE WITHOUT ESOPHAGITIS: ICD-10-CM

## 2024-11-01 DIAGNOSIS — F41.1 GENERALIZED ANXIETY DISORDER: ICD-10-CM

## 2024-11-01 DIAGNOSIS — F12.10 MARIJUANA ABUSE: Primary | ICD-10-CM

## 2024-11-01 DIAGNOSIS — F33.2 MAJOR DEPRESSIVE DISORDER, RECURRENT SEVERE WITHOUT PSYCHOTIC FEATURES (HCC): ICD-10-CM

## 2024-11-01 DIAGNOSIS — L50.3 DERMATOGRAPHIC URTICARIA: ICD-10-CM

## 2024-11-01 DIAGNOSIS — R55 VASOVAGAL SYNCOPE: Chronic | ICD-10-CM

## 2024-11-01 DIAGNOSIS — K29.50 CHRONIC GASTRITIS WITHOUT BLEEDING, UNSPECIFIED GASTRITIS TYPE: ICD-10-CM

## 2024-11-01 PROCEDURE — 99214 OFFICE O/P EST MOD 30 MIN: CPT | Performed by: FAMILY MEDICINE

## 2024-11-01 RX ORDER — ONDANSETRON 4 MG/1
4 TABLET, FILM COATED ORAL EVERY 8 HOURS PRN
Qty: 60 TABLET | Refills: 0 | Status: SHIPPED | OUTPATIENT
Start: 2024-11-01

## 2024-11-01 NOTE — ASSESSMENT & PLAN NOTE
Stable  Develops nausea and self treats with marijuana    Orders:    ondansetron (ZOFRAN) 4 mg tablet; Take 1 tablet (4 mg total) by mouth every 8 (eight) hours as needed for nausea or vomiting

## 2024-11-01 NOTE — PROGRESS NOTES
Virtual Regular Visit  Name: Kirk Alexander      : 2000      MRN: [id number]  Encounter Provider: Juventino Mae DO  Encounter Date: 2024   Encounter department: [facility]    Verification of patient location:    Patient is located at Home in the following state in which I hold an active license [place]    Assessment & Plan  Marijuana abuse  Unchanged  Patient smokes about 1/4 ounce of marijuana daily  Goes through 1 lbs of marijuana weekly  He no longer feels relaxed with use and is only using to prevent nausea from the withdrawal symptoms  Discussed with weaning by  ounce of maijuana every 4 days.   Start zofran 4mg as needed for nausea  Speak with family regarding this issue to obtain support from them  Orders:    ondansetron (ZOFRAN) 4 mg tablet; Take 1 tablet (4 mg total) by mouth every 8 (eight) hours as needed for nausea or vomiting    Chronic gastritis without bleeding, unspecified gastritis type  Waxes and wanes  Under care of GI  Most of his symptoms are likely due to chronic high amounts of marijuana use.  Patient's symptoms were gone when he was marijuana free during inpatient hospitalization  Discussed weaning off of marijuana  Script for zofran provided to assist with withdrawal symptoms           Gastroesophageal reflux disease without esophagitis  Stable  Develops nausea and self treats with marijuana    Orders:    ondansetron (ZOFRAN) 4 mg tablet; Take 1 tablet (4 mg total) by mouth every 8 (eight) hours as needed for nausea or vomiting    Major depressive disorder, recurrent severe without psychotic features (HCC)  Stable  Under care of psychiatry  Is now compliant with sertraline 50mg and remeron 15mg  Encouraged marijuana cessation         Generalized anxiety disorder  Stable  Follows with psychiatry and counseling  Marijuana no longer helps with symptoms  Is compliant with buspar         Dermatographic urticaria  Stable            Vasovagal syncope  Stable  No recurrences  Continue to monitor              Encounter provider Juventino Mae DO    The patient was identified by name and date of birth. Kirk SAGAR Alexander was informed that this is a telemedicine visit and that the visit is being conducted through the [employer] platform. He agrees to proceed..  My office door was closed. No one else was in the room.  He acknowledged consent and understanding of privacy and security of the video platform. The patient has agreed to participate and understands they can discontinue the visit at any time.    Patient is aware this is a billable service.     History of Present Illness     HPI    History obtained from : patient  Review of Systems   Constitutional:  Negative for activity change, chills, diaphoresis and fever.   HENT:  Negative for ear pain, hearing loss, postnasal drip, rhinorrhea, sinus pressure, sinus pain, sneezing and sore throat.    Respiratory:  Negative for cough, chest tightness, shortness of breath and wheezing.    Cardiovascular:  Negative for chest pain, palpitations and leg swelling.   Gastrointestinal:  Positive for abdominal pain, diarrhea (chronic) and nausea. Negative for blood in stool, constipation and vomiting.   Genitourinary:  Negative for dysuria, frequency, hematuria and urgency.   Musculoskeletal:  Negative for arthralgias and myalgias.   Neurological:  Negative for dizziness, syncope, weakness, light-headedness, numbness and headaches.   Psychiatric/Behavioral:  The patient is nervous/anxious.      Current Outpatient Medications on File Prior to Visit   Medication Sig Dispense Refill    amphetamine-dextroamphetamine (ADDERALL XR) 15 MG 24 hr capsule take 1 capsule by mouth every day in the morning      hydrOXYzine pamoate (VISTARIL) 50 mg capsule take 1 capsule by mouth twice a day if needed for anxiety      mirtazapine (REMERON) 15 mg tablet Take 15 mg by mouth daily at bedtime      nortriptyline  (PAMELOR) 25 mg capsule Take 1 capsule (25 mg total) by mouth daily at bedtime 90 capsule 3    sertraline (ZOLOFT) 50 mg tablet Take 50 mg by mouth daily      triamcinolone (KENALOG) 0.1 % cream Apply topically 2 (two) times a day as needed for rash 45 g 1    cetirizine (ZyrTEC) 10 mg tablet Take 1 tablet (10 mg total) by mouth daily (Patient not taking: Reported on 2/21/2024) 90 tablet 1    fluticasone (FLONASE) 50 mcg/act nasal spray 2 sprays into each nostril daily (Patient not taking: Reported on 9/20/2023) 18.2 mL 2     No current facility-administered medications on file prior to visit.      Social History     Tobacco Use    Smoking status: Never     Passive exposure: Never    Smokeless tobacco: Never   Vaping Use    Vaping status: Never Used   Substance and Sexual Activity    Alcohol use: Not Currently    Drug use: Not Currently     Types: Marijuana     Comment: last time a month ago    Sexual activity: Yes     Partners: Female     Birth control/protection: Condom Male         Objective     There were no vitals taken for this visit.  Physical Exam  Vitals reviewed.   Constitutional:       General: He is not in acute distress.     Appearance: He is normal weight. He is not ill-appearing, toxic-appearing or diaphoretic.   HENT:      Head: Normocephalic and atraumatic.      Right Ear: External ear normal.      Left Ear: External ear normal.      Nose: Nose normal. No congestion.      Mouth/Throat:      Mouth: Mucous membranes are moist.      Pharynx: Oropharynx is clear.   Eyes:      General: No scleral icterus.        Right eye: No discharge.         Left eye: No discharge.   Pulmonary:      Effort: No respiratory distress.   Skin:     Coloration: Skin is not pale.      Findings: No erythema.   Neurological:      Mental Status: He is alert. Mental status is at baseline.   Psychiatric:         Mood and Affect: Mood normal.         Thought Content: Thought content normal.         Visit Time  Total Visit Duration:  25

## 2024-11-01 NOTE — ASSESSMENT & PLAN NOTE
Unchanged  Patient smokes about 1/4 ounce of marijuana daily  Goes through 1 lbs of marijuana weekly  He no longer feels relaxed with use and is only using to prevent nausea from the withdrawal symptoms  Discussed with weaning by 1/16 ounce soo [place] every 4 days.   Start zofran 4mg as needed for nausea  Speak with family regarding this issue to obtain support from them  Orders:    ondansetron (ZOFRAN) 4 mg tablet; Take 1 tablet (4 mg total) by mouth every 8 (eight) hours as needed for nausea or vomiting

## 2024-11-01 NOTE — ASSESSMENT & PLAN NOTE
Waxes and wanes  Under care of GI  Most of his symptoms are likely due to chronic high amounts of marijuana use.  Patient's symptoms were gone when he was marijuana free during inpatient hospitalization  Discussed weaning off of marijuana  Script for zofran provided to assist with withdrawal symptoms

## 2024-11-01 NOTE — ASSESSMENT & PLAN NOTE
Stable  Follows with psychiatry and counseling  Marijuana no longer helps with symptoms  Is compliant with buspar

## 2024-11-01 NOTE — ASSESSMENT & PLAN NOTE
Stable  Under care of psychiatry  Is now compliant with sertraline 50mg and remeron 15mg  Encouraged marijuana cessation

## 2024-11-21 ENCOUNTER — OFFICE VISIT (OUTPATIENT)
Dept: FAMILY MEDICINE CLINIC | Facility: CLINIC | Age: 24
End: 2024-11-21
Payer: COMMERCIAL

## 2024-11-21 VITALS
WEIGHT: 127.6 LBS | SYSTOLIC BLOOD PRESSURE: 108 MMHG | BODY MASS INDEX: 18.9 KG/M2 | HEIGHT: 69 IN | OXYGEN SATURATION: 99 % | TEMPERATURE: 98.6 F | HEART RATE: 78 BPM | DIASTOLIC BLOOD PRESSURE: 80 MMHG

## 2024-11-21 DIAGNOSIS — K04.7 DENTAL INFECTION: Primary | ICD-10-CM

## 2024-11-21 PROCEDURE — 99213 OFFICE O/P EST LOW 20 MIN: CPT | Performed by: FAMILY MEDICINE

## 2024-11-21 RX ORDER — QUETIAPINE FUMARATE 50 MG/1
TABLET, FILM COATED ORAL
COMMUNITY
Start: 2024-11-08

## 2024-11-21 RX ORDER — AMOXICILLIN 500 MG/1
500 CAPSULE ORAL EVERY 8 HOURS SCHEDULED
Qty: 30 CAPSULE | Refills: 0 | Status: SHIPPED | OUTPATIENT
Start: 2024-11-21 | End: 2024-12-01

## 2024-11-21 RX ORDER — LORAZEPAM 1 MG/1
1 TABLET ORAL EVERY 8 HOURS PRN
Qty: 2 TABLET | Refills: 0 | Status: SHIPPED | OUTPATIENT
Start: 2024-11-21

## 2024-11-21 NOTE — PROGRESS NOTES
Name: Kirk Alexander      : 2000      MRN: [id number]  Encounter Provider: Juventino Mae DO  Encounter Date: 2024   Encounter department: [facility]  :  Assessment & Plan  Dental infection  Unchanged  Patient has poor dentition due to anxiety of the dentist  Has had several dental infections in the past  Has some swelling today and is concerned about recurrent infection  There is no induration, minimal tenderness and swelling of left mandible  Continue to monitor and if symptoms worsen start amoxicillin    Orders:    Ambulatory Referral to Dentistry; Future    LORazepam (ATIVAN) 1 mg tablet; Take 1 tablet (1 mg total) by mouth every 8 (eight) hours as needed for anxiety    amoxicillin (AMOXIL) 500 mg capsule; Take 1 capsule (500 mg total) by mouth every 8 (eight) hours for 10 days           History of Present Illness     Presents today for lump on his occiput and mild headache. He was seen in the ED for this and discharged  He is taking care of his health. Is trying to find a dentist and in the mean time he has swelling of his gums.     He is in the process of finding a new psychiatrist.       Review of Systems   Constitutional:  Negative for activity change, chills, diaphoresis and fever.   HENT:  Positive for dental problem. Negative for ear pain, hearing loss, postnasal drip, rhinorrhea, sinus pressure, sinus pain, sneezing and sore throat.    Respiratory:  Negative for cough, chest tightness, shortness of breath and wheezing.    Cardiovascular:  Negative for chest pain, palpitations and leg swelling.   Gastrointestinal:  Negative for abdominal pain, blood in stool, constipation, diarrhea, nausea and vomiting.   Genitourinary:  Negative for dysuria, frequency, hematuria and urgency.   Musculoskeletal:  Negative for arthralgias and myalgias.   Neurological:  Negative for dizziness, syncope, weakness, light-headedness, numbness and headaches.  "    Current Outpatient Medications on File Prior to Visit   Medication Sig Dispense Refill    amphetamine-dextroamphetamine (ADDERALL XR) 15 MG 24 hr capsule take 1 capsule by mouth every day in the morning      hydrOXYzine pamoate (VISTARIL) 50 mg capsule take 1 capsule by mouth twice a day if needed for anxiety      mirtazapine (REMERON) 15 mg tablet Take 15 mg by mouth daily at bedtime      nortriptyline (PAMELOR) 25 mg capsule Take 1 capsule (25 mg total) by mouth daily at bedtime 90 capsule 3    ondansetron (ZOFRAN) 4 mg tablet Take 1 tablet (4 mg total) by mouth every 8 (eight) hours as needed for nausea or vomiting 60 tablet 0    QUEtiapine (SEROquel) 50 mg tablet       sertraline (ZOLOFT) 50 mg tablet Take 50 mg by mouth daily      triamcinolone (KENALOG) 0.1 % cream Apply topically 2 (two) times a day as needed for rash 45 g 1    cetirizine (ZyrTEC) 10 mg tablet Take 1 tablet (10 mg total) by mouth daily (Patient not taking: Reported on 2/21/2024) 90 tablet 1    fluticasone (FLONASE) 50 mcg/act nasal spray 2 sprays into each nostril daily (Patient not taking: Reported on 9/20/2023) 18.2 mL 2     No current facility-administered medications on file prior to visit.      Social History     Tobacco Use    Smoking status: Never     Passive exposure: Never    Smokeless tobacco: Never   Vaping Use    Vaping status: Never Used   Substance and Sexual Activity    Alcohol use: Not Currently    Drug use: Not Currently     Types: Marijuana     Comment: last time a month ago    Sexual activity: Yes     Partners: Female     Birth control/protection: Condom Male        Objective   /80 (BP Location: Left arm, Patient Position: Sitting, Cuff Size: Adult)   Pulse 78   Temp 98.6 °F (37 °C) (Temporal)   Ht 5' 9\" (1.753 m)   Wt 57.9 kg (127 lb 9.6 oz)   SpO2 99%   BMI 18.84 kg/m²      Physical Exam  Vitals reviewed.   Constitutional:       General: He is not in acute distress.     Appearance: He is well-developed. He " is not diaphoretic.   HENT:      Head: Normocephalic and atraumatic.      Right Ear: Tympanic membrane, ear canal and external ear normal. There is no impacted cerumen.      Left Ear: Tympanic membrane, ear canal and external ear normal. There is no impacted cerumen.      Nose: Nose normal. No congestion.      Mouth/Throat:      Mouth: Mucous membranes are moist.      Dentition: Abnormal dentition. Dental tenderness, gingival swelling and dental caries present. No dental abscesses or gum lesions.      Pharynx: Oropharynx is clear.   Eyes:      General: No scleral icterus.        Right eye: No discharge.         Left eye: No discharge.      Conjunctiva/sclera: Conjunctivae normal.      Pupils: Pupils are equal, round, and reactive to light.   Neck:      Vascular: No JVD.   Cardiovascular:      Rate and Rhythm: Normal rate and regular rhythm.      Heart sounds: Normal heart sounds. No murmur heard.     No friction rub.   Pulmonary:      Effort: Pulmonary effort is normal. No respiratory distress.      Breath sounds: Normal breath sounds. No wheezing or rales.   Chest:      Chest wall: No tenderness.   Abdominal:      General: Bowel sounds are normal. There is no distension.      Palpations: Abdomen is soft. There is no mass.      Tenderness: There is no abdominal tenderness. There is no guarding or rebound.   Musculoskeletal:         General: No tenderness or deformity. Normal range of motion.      Cervical back: Normal range of motion and neck supple.   Skin:     General: Skin is warm and dry.      Findings: No erythema or rash.   Neurological:      Mental Status: He is alert and oriented to person, place, and time. Mental status is at baseline.      Cranial Nerves: No cranial nerve deficit.   Psychiatric:         Mood and Affect: Mood normal.

## 2024-11-21 NOTE — ASSESSMENT & PLAN NOTE
Unchanged  Patient has poor dentition due to anxiety of the dentist  Has had several dental infections in the past  Has some swelling today and is concerned about recurrent infection  There is no induration, minimal tenderness and swelling of left mandible  Continue to monitor and if symptoms worsen start amoxicillin    Orders:    Ambulatory Referral to Dentistry; Future    LORazepam (ATIVAN) 1 mg tablet; Take 1 tablet (1 mg total) by mouth every 8 (eight) hours as needed for anxiety    amoxicillin (AMOXIL) 500 mg capsule; Take 1 capsule (500 mg total) by mouth every 8 (eight) hours for 10 days

## 2024-12-19 ENCOUNTER — OFFICE VISIT (OUTPATIENT)
Dept: DENTISTRY | Facility: CLINIC | Age: 24
End: 2024-12-19

## 2024-12-19 VITALS — SYSTOLIC BLOOD PRESSURE: 128 MMHG | HEART RATE: 74 BPM | DIASTOLIC BLOOD PRESSURE: 85 MMHG | TEMPERATURE: 98 F

## 2024-12-19 DIAGNOSIS — K08.89 NONRESTORABLE TOOTH: Primary | ICD-10-CM

## 2024-12-19 NOTE — PROGRESS NOTES
"Procedure Details   - LIMITED ORAL EVALUATION - PROBLEM FOCUSED   - BITEWINGS - 4 RADIOGRAPHIC IMAGES   - PANORAMIC RADIOGRAPHIC IMAGE    Limited Exam    Kirk KEITH Benjamin 24 y.o. male presents with self to [facility] for comp exam  PMH reviewed, no changes, ASA II. Significant medical history: generalized anxiety disorder- patient has severe dental anxiety. Significant allergies: penicillin. Significant medications: none.    Chief complaint:  \" I am extremely scared of the dentist and have not had work done in years\"  - Was seen in July 2024 in ED regarding chronic abscess ( patient reported of a \"bubble\" at LL quad of mouth likely from #18/19) and abx were prescribed    - Patient presented to dental appt with mom and . Has severe dental anxiety and takes ativan to help with his generalized anxiety. He has not had a dental cleaning/ work done in years as he is very afraid of dental instruments (saw a dentist about 1 year ago but was not able to tolerate any type of work). Clinical exam shows multiple carious teeth to gingiva, impacted canine #11), large carious lesions, gingival inflammation, etc. Was not able to use explorer to check teeth due to patient anxiety, but was okay with checking with mirror.     - After discussion with attending, it was recommended that the patient be first referred to [facility] for extractions of hopeless/ non-restorable teeth. After gross caries/ infection is under control, patient was also given the information to be referred to sedation at either [facility] or [facility] for teeth that we can restore with operative and pros.    - Alternative option was for patient to be seen in OR, but due to length of wait/ limited treatment in the OR- best for patient to be seen by outside clinic    - Referral given and explained to patient. Referral for [facility] ext #s 2,3,14,15,18,19 and information to contact [facility].    Patient dismissed ambulatory and " alert.      Attending: Dr. Zambrano  was consulted with regarding plan for patient .

## 2024-12-27 ENCOUNTER — TELEPHONE (OUTPATIENT)
Age: 24
End: 2024-12-27

## 2024-12-27 NOTE — TELEPHONE ENCOUNTER
Pattie called with the patient on the phone to see if there was an ambulatory referral in for an allergist due to the patient getting itchy all of the time. Advised her there is one in the system that is still valid for a few more weeks. Provided her with the referral doctor listed on the referral:  Gabe Camacho MD    Phone: [phone]    Fax: [phone]  [address] [place] [address]

## 2025-01-07 ENCOUNTER — OFFICE VISIT (OUTPATIENT)
Dept: URGENT CARE | Age: 25
End: 2025-01-07
Payer: COMMERCIAL

## 2025-01-07 VITALS
HEART RATE: 60 BPM | RESPIRATION RATE: 18 BRPM | DIASTOLIC BLOOD PRESSURE: 78 MMHG | TEMPERATURE: 97.7 F | SYSTOLIC BLOOD PRESSURE: 126 MMHG | OXYGEN SATURATION: 99 %

## 2025-01-07 DIAGNOSIS — K04.7 DENTAL ABSCESS: Primary | ICD-10-CM

## 2025-01-07 PROCEDURE — 99213 OFFICE O/P EST LOW 20 MIN: CPT | Performed by: EMERGENCY MEDICINE

## 2025-01-07 RX ORDER — ESCITALOPRAM OXALATE 10 MG/1
10 TABLET ORAL EVERY MORNING
COMMUNITY
Start: 2024-12-27

## 2025-01-07 RX ORDER — CLINDAMYCIN HYDROCHLORIDE 150 MG/1
450 CAPSULE ORAL 3 TIMES DAILY
Qty: 126 CAPSULE | Refills: 0 | Status: SHIPPED | OUTPATIENT
Start: 2025-01-07 | End: 2025-01-21

## 2025-01-07 RX ORDER — TRAZODONE HYDROCHLORIDE 100 MG/1
100 TABLET ORAL
COMMUNITY
Start: 2025-01-02

## 2025-01-07 RX ORDER — ESCITALOPRAM OXALATE 20 MG/1
20 TABLET ORAL EVERY MORNING
COMMUNITY
Start: 2025-01-02

## 2025-01-07 NOTE — PROGRESS NOTES
[facility]        NAME: Kirk Alexander is a 24 y.o. male  : 2000    MRN: [id number]  DATE: 2025  TIME: 8:48 AM    Assessment and Plan   Dental abscess [K04.7]  1. Dental abscess  clindamycin (CLEOCIN) 150 mg capsule      Offered incision and drainage of dental abscess in clinic which patient declined.  Patient states that he waited prefer to trial antibiotics since his abscess has been self draining at home.  Advised him to keep his appointment with his dentist, and to seek care in ED if he develops worsening symptoms.  All questions were answered at bedside, patient was amenable to plan and voiced understanding.      Patient Instructions       Follow up with PCP in 3-5 days.  Proceed to  ER if symptoms worsen.    If tests have been performed at [facility], our office will contact you with results if changes need to be made to the care plan discussed with you at the visit.  [facility]    Chief Complaint     Chief Complaint   Patient presents with    Oral Pain     States having discomfort in gums  Has an abscess in top left side of mouth  Tried to schedule appointment with dentist, unable to get in until February, told to come get antibiotic         History of Present Illness       24-year-old male with history of depression, anxiety, GERD presents with a chief complaint of dental abscess on his left upper maxillary gum region first noticed 1 week ago.  Patient states that has been intermittently draining purulent material.  He denies any fevers, trismus, stridor, drooling, difficulty breathing or difficulty swallowing or tolerating p.o. intake.  States that he tried to arrange an appointment with his dentist however was unable to until next month and was subsequently told to come to urgent care or see his PCP for evaluation in the interim for possible antibiotics.  Patient does endorse an allergy to penicillins characterized as diffuse  pruritus.    Dental Pain   This is a recurrent problem. The current episode started in the past 7 days. The problem occurs constantly. The problem has been waxing and waning. The pain is at a severity of 2/10. The pain is mild. Pertinent negatives include no difficulty swallowing, facial pain, fever, oral bleeding, sinus pressure or thermal sensitivity. He has tried nothing for the symptoms.       Review of Systems   Review of Systems   Constitutional:  Negative for activity change, appetite change, chills, diaphoresis, fatigue, fever and unexpected weight change.   HENT:  Positive for dental problem. Negative for congestion, drooling, ear discharge, ear pain, facial swelling, hearing loss, mouth sores, nosebleeds, postnasal drip, rhinorrhea, sinus pressure, sinus pain, sneezing, sore throat, tinnitus, trouble swallowing and voice change.    Eyes:  Negative for photophobia, pain, discharge, redness, itching and visual disturbance.   Respiratory:  Negative for cough and shortness of breath.    Cardiovascular:  Negative for chest pain, palpitations and leg swelling.   Gastrointestinal:  Negative for abdominal pain and vomiting.   Genitourinary:  Negative for difficulty urinating, dysuria and hematuria.   Musculoskeletal:  Negative for arthralgias, back pain, gait problem, joint swelling, myalgias, neck pain and neck stiffness.   Skin:  Negative for color change and rash.   Neurological:  Negative for seizures and syncope.   All other systems reviewed and are negative.        Current Medications       Current Outpatient Medications:     amphetamine-dextroamphetamine (ADDERALL XR) 15 MG 24 hr capsule, take 1 capsule by mouth every day in the morning, Disp: , Rfl:     clindamycin (CLEOCIN) 150 mg capsule, Take 3 capsules (450 mg total) by mouth 3 (three) times a day for 14 days, Disp: 126 capsule, Rfl: 0    escitalopram (LEXAPRO) 10 mg tablet, Take 10 mg by mouth every morning, Disp: , Rfl:     escitalopram (LEXAPRO) 20  mg tablet, Take 20 mg by mouth every morning, Disp: , Rfl:     hydrOXYzine pamoate (VISTARIL) 50 mg capsule, take 1 capsule by mouth twice a day if needed for anxiety, Disp: , Rfl:     LORazepam (ATIVAN) 1 mg tablet, Take 1 tablet (1 mg total) by mouth every 8 (eight) hours as needed for anxiety, Disp: 2 tablet, Rfl: 0    mirtazapine (REMERON) 15 mg tablet, Take 15 mg by mouth daily at bedtime, Disp: , Rfl:     nortriptyline (PAMELOR) 25 mg capsule, Take 1 capsule (25 mg total) by mouth daily at bedtime, Disp: 90 capsule, Rfl: 3    ondansetron (ZOFRAN) 4 mg tablet, Take 1 tablet (4 mg total) by mouth every 8 (eight) hours as needed for nausea or vomiting, Disp: 60 tablet, Rfl: 0    QUEtiapine (SEROquel) 50 mg tablet, , Disp: , Rfl:     sertraline (ZOLOFT) 50 mg tablet, Take 50 mg by mouth daily, Disp: , Rfl:     traZODone (DESYREL) 100 mg tablet, Take 100 mg by mouth daily at bedtime, Disp: , Rfl:     triamcinolone (KENALOG) 0.1 % cream, Apply topically 2 (two) times a day as needed for rash, Disp: 45 g, Rfl: 1    cetirizine (ZyrTEC) 10 mg tablet, Take 1 tablet (10 mg total) by mouth daily (Patient not taking: Reported on 2/21/2024), Disp: 90 tablet, Rfl: 1    fluticasone (FLONASE) 50 mcg/act nasal spray, 2 sprays into each nostril daily (Patient not taking: Reported on 9/20/2023), Disp: 18.2 mL, Rfl: 2    Current Allergies     Allergies as of 01/07/2025 - Reviewed 01/07/2025   Allergen Reaction Noted    Penicillin v Itching 08/02/2024            The following portions of the patient's history were reviewed and updated as appropriate: allergies, current medications, past family history, past medical history, past social history, past surgical history and problem list.     Past Medical History:   Diagnosis Date    Anxiety     GERD (gastroesophageal reflux disease)        No past surgical history on file.    Family History   Problem Relation Age of Onset    Drug abuse Maternal Aunt     Alcohol abuse Maternal Aunt      Anxiety disorder Paternal Grandmother     Completed Suicide  Cousin          Medications have been verified.        Objective   /78   Pulse 60   Temp 97.7 °F (36.5 °C)   Resp 18   SpO2 99%   No LMP for male patient.       Physical Exam     Physical Exam  Vitals and nursing note reviewed.   Constitutional:       General: He is not in acute distress.     Appearance: Normal appearance. He is obese. He is not ill-appearing or toxic-appearing.   HENT:      Head: Normocephalic and atraumatic.      Right Ear: External ear normal.      Left Ear: External ear normal.      Nose: Nose normal. No congestion or rhinorrhea.      Mouth/Throat:      Mouth: Mucous membranes are moist.      Dentition: Abnormal dentition. Does not have dentures. Dental tenderness, dental caries, dental abscesses and gum lesions present. No gingival swelling.      Tongue: No lesions. Tongue does not deviate from midline.      Palate: No mass and lesions.      Pharynx: Oropharynx is clear. No pharyngeal swelling, oropharyngeal exudate, posterior oropharyngeal erythema, uvula swelling or postnasal drip.      Tonsils: No tonsillar exudate or tonsillar abscesses. 0 on the right. 0 on the left.        Comments: No stridor, trismus, drooling noted  Eyes:      General: No scleral icterus.        Right eye: No discharge.         Left eye: No discharge.      Extraocular Movements: Extraocular movements intact.      Conjunctiva/sclera: Conjunctivae normal.      Pupils: Pupils are equal, round, and reactive to light.   Cardiovascular:      Rate and Rhythm: Normal rate and regular rhythm.      Pulses: Normal pulses.      Heart sounds: Normal heart sounds.   Pulmonary:      Effort: Pulmonary effort is normal. No respiratory distress.      Breath sounds: Normal breath sounds. No stridor. No wheezing, rhonchi or rales.   Chest:      Chest wall: No tenderness.   Abdominal:      General: Abdomen is flat.   Musculoskeletal:      Cervical back: Normal range of  motion and neck supple. No rigidity.   Skin:     General: Skin is warm and dry.      Capillary Refill: Capillary refill takes less than 2 seconds.   Neurological:      General: No focal deficit present.      Mental Status: He is alert and oriented to person, place, and time.   Psychiatric:         Mood and Affect: Mood normal.         Behavior: Behavior normal.

## 2025-06-20 ENCOUNTER — OFFICE VISIT (OUTPATIENT)
Dept: FAMILY MEDICINE CLINIC | Facility: CLINIC | Age: 25
End: 2025-06-20
Payer: COMMERCIAL

## 2025-06-20 VITALS
HEART RATE: 87 BPM | SYSTOLIC BLOOD PRESSURE: 104 MMHG | OXYGEN SATURATION: 98 % | DIASTOLIC BLOOD PRESSURE: 70 MMHG | BODY MASS INDEX: 18.1 KG/M2 | HEIGHT: 69 IN | WEIGHT: 122.2 LBS | TEMPERATURE: 97.9 F

## 2025-06-20 DIAGNOSIS — R11.2 NAUSEA AND VOMITING, UNSPECIFIED VOMITING TYPE: ICD-10-CM

## 2025-06-20 DIAGNOSIS — F12.10 MARIJUANA ABUSE: ICD-10-CM

## 2025-06-20 DIAGNOSIS — Z00.00 ANNUAL PHYSICAL EXAM: Primary | ICD-10-CM

## 2025-06-20 DIAGNOSIS — L20.9 ATOPIC DERMATITIS, UNSPECIFIED TYPE: ICD-10-CM

## 2025-06-20 DIAGNOSIS — R73.03 PREDIABETES: ICD-10-CM

## 2025-06-20 DIAGNOSIS — F41.1 GENERALIZED ANXIETY DISORDER: ICD-10-CM

## 2025-06-20 DIAGNOSIS — L50.3 DERMATOGRAPHIC URTICARIA: ICD-10-CM

## 2025-06-20 DIAGNOSIS — Z13.6 SCREENING FOR CARDIOVASCULAR CONDITION: ICD-10-CM

## 2025-06-20 DIAGNOSIS — R79.89 ELEVATED LIVER FUNCTION TESTS: ICD-10-CM

## 2025-06-20 DIAGNOSIS — K29.50 CHRONIC GASTRITIS WITHOUT BLEEDING, UNSPECIFIED GASTRITIS TYPE: ICD-10-CM

## 2025-06-20 DIAGNOSIS — F33.2 MAJOR DEPRESSIVE DISORDER, RECURRENT SEVERE WITHOUT PSYCHOTIC FEATURES (HCC): ICD-10-CM

## 2025-06-20 PROCEDURE — 99395 PREV VISIT EST AGE 18-39: CPT | Performed by: FAMILY MEDICINE

## 2025-06-20 PROCEDURE — 99214 OFFICE O/P EST MOD 30 MIN: CPT | Performed by: FAMILY MEDICINE

## 2025-06-20 RX ORDER — TRAZODONE HYDROCHLORIDE 50 MG/1
100 TABLET ORAL
Start: 2025-06-20

## 2025-06-20 RX ORDER — TRAZODONE HYDROCHLORIDE 50 MG/1
50 TABLET ORAL
Start: 2025-06-20 | End: 2025-06-20

## 2025-06-20 NOTE — ASSESSMENT & PLAN NOTE
Patient uses marijuana to help with stomach pain  This has been the only medication that helps with his symptoms

## 2025-06-20 NOTE — PROGRESS NOTES
Adult Annual Physical  Name: Kirk Alexander      : 2000      MRN: [id number]  Encounter Provider: Juventino Mae DO  Encounter Date: 2025   Encounter department: [facility]    :  Assessment & Plan  Annual physical exam         Chronic gastritis without bleeding, unspecified gastritis type  Continues to have nausea and discomfort  Under care of GI       Major depressive disorder, recurrent severe without psychotic features (HCC)  Depression Screening Follow-up Plan: Patient's depression screening was positive with a PHQ-9 score of 27. Continue regular follow-up with their psychologist/therapist/psychiatrist who is managing their mental health condition(s).  Continue close follow up    Orders:  •  traZODone (DESYREL) 50 mg tablet; Take 2 tablets (100 mg total) by mouth daily at bedtime  •  CBC and differential; Future    Generalized anxiety disorder  stable       Marijuana abuse  Patient uses marijuana to help with stomach pain  This has been the only medication that helps with his symptoms       Atopic dermatitis, unspecified type  Stable  Continue to monitor       Dermatographic urticaria  Stable  Has kenalog 0.1% prn       Prediabetes  Due for repeat fbs  Orders:  •  Comprehensive metabolic panel; Future    Elevated liver function tests  Lab Results   Component Value Date    SODIUM 141 2024    K 3.4 (L) 2024     2024    CO2 29 2024    AGAP 7 2024    BUN 12 2024    CREATININE 0.95 2024    GLUC 91 2024    GLUF 104 (H) 2023    CALCIUM 9.4 2024    AST 15 2024    ALT 18 2024    ALKPHOS 64 2024    TP 7.2 2024    TBILI 0.6 2024    EGFR 116 2024     Stable and resolved  Orders:  •  Comprehensive metabolic panel; Future    Nausea and vomiting, unspecified vomiting type  Unclear cause  Thought initially 2/2 marijuana overuse but symptoms recurred despite  cessation  Only treatment that helps with nausea is thc  Will evaluate for gastroperesis with emptying study.  Orders:  •  NM gastric emptying; Future  •  Comprehensive metabolic panel; Future    Screening for cardiovascular condition    Orders:  •  Lipid panel; Future        Preventive Screenings:  - Diabetes Screening: risks/benefits discussed and orders placed  - Cholesterol Screening: risks/benefits discussed and orders placed   - Hepatitis C screening: screening up-to-date   - HIV screening: screening up-to-date   - Colon cancer screening: screening not indicated   - Lung cancer screening: screening not indicated   - Prostate cancer screening: screening not indicated     Immunizations:  - Immunizations due: Tdap and HPV (Gardasil 9)    Counseling/Anticipatory Guidance:  - Alcohol: discussed moderation in alcohol intake and recommendations for healthy alcohol use.   - Drug use: discussed harms of illicit drug use and how it can negatively impact mental/physical health.   - Tobacco use: discussed harms of tobacco use and management options for quitting.   - Dental health: discussed importance of regular tooth brushing, flossing, and dental visits.   - Sexual health: discussed sexually transmitted diseases, partner selection, use of condoms, avoidance of unintended pregnancy, and contraceptive alternatives.   - Diet: discussed recommendations for a healthy/well-balanced diet.   - Exercise: the importance of regular exercise/physical activity was discussed. Recommend exercise 3-5 times per week for at least 30 minutes.   - Injury prevention: discussed safety/seat belts, safety helmets, smoke detectors, carbon monoxide detectors, and smoking near bedding or upholstery.       Depression Screening and Follow-up Plan: Patient's depression screening was positive with a PHQ-9 score of 27.   Continue regular follow-up with their mental health provider who is managing their mental health condition(s).         History of  "Present Illness     Adult Annual Physical:  Patient presents for annual physical.     Diet and Physical Activity:  - Diet/Nutrition: no special diet.  - Exercise: no formal exercise.    Depression Screening:    - PHQ-9 Score: 27    General Health:  - Sleep: sleeps poorly and 1-3 hours of sleep on average.  - Hearing: normal hearing bilateral ears.  - Vision: wears glasses, vision problems and most recent eye exam > 1 year ago.  - Dental: regular dental visits, brushes teeth twice daily and does not floss.     Health:  - History of STDs: no.   - Urinary symptoms: none.     Review of Systems   Constitutional:  Negative for activity change, chills, diaphoresis and fever.   HENT:  Negative for ear pain, hearing loss, postnasal drip, rhinorrhea, sinus pressure, sinus pain, sneezing and sore throat.    Respiratory:  Negative for cough, chest tightness, shortness of breath and wheezing.    Cardiovascular:  Negative for chest pain, palpitations and leg swelling.   Gastrointestinal:  Positive for abdominal pain and nausea. Negative for blood in stool, constipation, diarrhea and vomiting.   Genitourinary:  Negative for dysuria, frequency, hematuria and urgency.   Musculoskeletal:  Negative for arthralgias and myalgias.   Neurological:  Negative for dizziness, syncope, weakness, light-headedness, numbness and headaches.   Psychiatric/Behavioral:  Positive for decreased concentration, dysphoric mood and sleep disturbance. Negative for agitation, behavioral problems, confusion, hallucinations, self-injury and suicidal ideas. The patient is nervous/anxious. The patient is not hyperactive.      Medications Ordered Prior to Encounter[1]   Social History[2]    Objective   /70 (BP Location: Left arm, Patient Position: Sitting, Cuff Size: Adult)   Pulse 87   Temp 97.9 °F (36.6 °C) (Temporal)   Ht 5' 9\" (1.753 m)   Wt 55.4 kg (122 lb 3.2 oz)   SpO2 98%   BMI 18.05 kg/m²     Physical Exam  Vitals reviewed. "   Constitutional:       General: He is not in acute distress.     Appearance: He is well-developed. He is not diaphoretic.   HENT:      Head: Normocephalic and atraumatic.      Right Ear: Tympanic membrane, ear canal and external ear normal. There is no impacted cerumen.      Left Ear: Tympanic membrane, ear canal and external ear normal. There is no impacted cerumen.      Nose: Nose normal. No congestion.      Mouth/Throat:      Mouth: Mucous membranes are moist.      Pharynx: Oropharynx is clear.     Eyes:      General: No scleral icterus.        Right eye: No discharge.         Left eye: No discharge.      Conjunctiva/sclera: Conjunctivae normal.      Pupils: Pupils are equal, round, and reactive to light.     Neck:      Vascular: No JVD.     Cardiovascular:      Rate and Rhythm: Normal rate and regular rhythm.      Heart sounds: Normal heart sounds. No murmur heard.     No friction rub.   Pulmonary:      Effort: Pulmonary effort is normal. No respiratory distress.      Breath sounds: Normal breath sounds. No wheezing or rales.   Chest:      Chest wall: No tenderness.   Abdominal:      General: Bowel sounds are normal. There is no distension.      Palpations: Abdomen is soft. There is no mass.      Tenderness: There is no abdominal tenderness. There is no guarding or rebound.     Musculoskeletal:         General: No tenderness or deformity. Normal range of motion.      Cervical back: Normal range of motion and neck supple.     Skin:     General: Skin is warm and dry.      Findings: No erythema or rash.     Neurological:      Mental Status: He is alert and oriented to person, place, and time. Mental status is at baseline.      Cranial Nerves: No cranial nerve deficit.     Psychiatric:         Mood and Affect: Mood is anxious and depressed.         Speech: Speech normal.         Behavior: Behavior is cooperative.         Cognition and Memory: Cognition normal.                [1]  Current Outpatient Medications on  File Prior to Visit   Medication Sig Dispense Refill   • escitalopram (LEXAPRO) 20 mg tablet Take 20 mg by mouth every morning     • hydrOXYzine pamoate (VISTARIL) 50 mg capsule      • ondansetron (ZOFRAN) 4 mg tablet Take 1 tablet (4 mg total) by mouth every 8 (eight) hours as needed for nausea or vomiting 60 tablet 0   • QUEtiapine (SEROquel) 50 mg tablet      • triamcinolone (KENALOG) 0.1 % cream Apply topically 2 (two) times a day as needed for rash 45 g 1     No current facility-administered medications on file prior to visit.   [2]  Social History  Tobacco Use   • Smoking status: Never     Passive exposure: Never   • Smokeless tobacco: Never   Vaping Use   • Vaping status: Never Used   Substance and Sexual Activity   • Alcohol use: Not Currently   • Drug use: Yes     Types: Marijuana     Comment: last time a month ago   • Sexual activity: Yes     Partners: Female     Birth control/protection: Condom Male

## 2025-06-20 NOTE — ASSESSMENT & PLAN NOTE
Depression Screening Follow-up Plan: Patient's depression screening was positive with a PHQ-9 score of 27. Continue regular follow-up with their psychologist/therapist/psychiatrist who is managing their mental health condition(s).  Continue close follow up    Orders:  •  traZODone (DESYREL) 50 mg tablet; Take 2 tablets (100 mg total) by mouth daily at bedtime  •  CBC and differential; Future

## 2025-06-20 NOTE — ASSESSMENT & PLAN NOTE
Lab Results   Component Value Date    SODIUM 141 04/17/2024    K 3.4 (L) 04/17/2024     04/17/2024    CO2 29 04/17/2024    AGAP 7 04/17/2024    BUN 12 04/17/2024    CREATININE 0.95 04/17/2024    GLUC 91 04/17/2024    GLUF 104 (H) 07/18/2023    CALCIUM 9.4 04/17/2024    AST 15 04/17/2024    ALT 18 04/17/2024    ALKPHOS 64 04/17/2024    TP 7.2 04/17/2024    TBILI 0.6 04/17/2024    EGFR 116 04/17/2024     Stable and resolved  Orders:  •  Comprehensive metabolic panel; Future

## 2025-07-22 ENCOUNTER — OFFICE VISIT (OUTPATIENT)
Dept: GASTROENTEROLOGY | Facility: MEDICAL CENTER | Age: 25
End: 2025-07-22

## 2025-07-22 VITALS
HEIGHT: 69 IN | HEART RATE: 72 BPM | TEMPERATURE: 97.4 F | SYSTOLIC BLOOD PRESSURE: 112 MMHG | WEIGHT: 122 LBS | BODY MASS INDEX: 18.07 KG/M2 | DIASTOLIC BLOOD PRESSURE: 76 MMHG | OXYGEN SATURATION: 98 %

## 2025-07-22 DIAGNOSIS — K21.9 GASTROESOPHAGEAL REFLUX DISEASE WITHOUT ESOPHAGITIS: ICD-10-CM

## 2025-07-22 DIAGNOSIS — R11.2 NAUSEA AND VOMITING, UNSPECIFIED VOMITING TYPE: ICD-10-CM

## 2025-07-22 DIAGNOSIS — R10.10 PAIN OF UPPER ABDOMEN: Primary | ICD-10-CM

## 2025-07-22 NOTE — PROGRESS NOTES
Name: Kirk Alexander      : 2000      MRN: [id number]  Encounter Provider: Jo Porras PA-C  Encounter Date: 2025   [facility]  :  Assessment & Plan  Pain of upper abdomen  Nausea and vomiting, unspecified vomiting type  Patient with longstanding history of epigastric pain, nausea, vomiting, and dyspepsia not improved with H2RA or PPI.  EGD 10/2023 showed a small hiatal hernia, Schatzki ring status post 20 [place] dilation, with biopsies negative for EOE, H. pylori, and celiac.  Has a history of significant marijuana use on a daily basis for many years.  It was thought he had cyclical vomiting syndrome so he discontinued marijuana for 6 months without any improvement in his symptoms.  He was also given nortriptyline 10 mg nightly without any improvement.  He is now smoking marijuana again.  I recommend increasing nortriptyline to 25 mg at night if cleared by his psychiatrist.  I also recommend right upper quadrant ultrasound to rule out biliary etiology and gastric emptying scan to rule out gastroparesis.       Gastroesophageal reflux disease without esophagitis  The patient has no complaints of heartburn or reflux.  Prior EGD 10/2023 revealed Schatzki's ring and small type I hiatal hernia.           History of Present Illness   Kirk Alexander is a 25 y.o. male who presents for nausea, vomiting, and abdominal pain.  The patient states that his pain is mainly in his middle abdomen.  It hurts when he is hungry but also hurts after he eats as well.  This has been chronic for the patient for many years.  He feels as though it is worsening.  He has tried and failed both omeprazole and famotidine.  At one point was diagnosed with cyclical vomiting and was put on nortriptyline 10 mg every night without any improvement.  He also states he discontinued marijuana for a total of 6 months and noticed no improvement.  Previously had  "complaints of diarrhea but states he is currently going every 2 days.  His bowel movements are soft and mushy.  No blood.  Prior testing included an endoscopy 10/23/2023 which revealed a Schatzki's ring in the lower third of the esophagus dilated to 20 [place] in size and a small sliding hiatal hernia.  Duodenal, stomach, and esophageal biopsies were obtained and negative for celiac, H. pylori, and EOE respectively.    EGD 10/23/23  The esophagus, stomach and duodenum appeared normal.  Schatzki ring in the lower third of the esophagus; dilated to 20 Fr ending size  Small type I hiatal hernia  Performed forceps biopsies in the upper third of the esophagus, body of the stomach, antrum, duodenal bulb and 2nd part of the duodenum  Path:   A. Duodenum (biopsy):  - Duodenal mucosa with no diagnostic abnormality  - No Marsh lesion  - Negative for dysplasia   B. Stomach (biopsy):  - Oxyntic mucosa with no diagnostic abnormality  - No H pylori identified (H&E)  - No intestinal metaplasia   C. Proximal esophagus (biopsy):  - Esophageal mucosa with reactive changes  - Eosinophils are inconspicuous  - No intestinal metaplasia       Review of Systems A complete review of systems is negative other than that noted above in the HPI.      Current Medications[1]  Objective   /76 (BP Location: Right arm, Patient Position: Sitting, Cuff Size: Standard)   Pulse 72   Temp (!) 97.4 °F (36.3 °C) (Tympanic)   Ht 5' 9\" (1.753 m)   Wt 55.3 kg (122 lb)   SpO2 98%   BMI 18.02 kg/m²       Physical Exam  Vitals and nursing note reviewed.   Constitutional:       General: He is not in acute distress.     Appearance: He is well-developed.   HENT:      Head: Normocephalic and atraumatic.     Eyes:      Conjunctiva/sclera: Conjunctivae normal.     Pulmonary:      Effort: Pulmonary effort is normal.      Breath sounds: Normal breath sounds.     Musculoskeletal:         General: No swelling.      Cervical back: Neck supple.     Skin:     " General: Skin is warm and dry.      Capillary Refill: Capillary refill takes less than 2 seconds.     Neurological:      Mental Status: He is alert.     Psychiatric:         Mood and Affect: Mood normal.        Lab Results: I personally reviewed relevant lab results.       Results for orders placed during the hospital encounter of 10/23/23    EGD    Impression  The esophagus, stomach and duodenum appeared normal.  Schatzki ring in the lower third of the esophagus; dilated to 20 Fr ending size  Small type I hiatal hernia  Performed forceps biopsies in the upper third of the esophagus, body of the stomach, antrum, duodenal bulb and 2nd part of the duodenum      RECOMMENDATION:  Await pathology results  Possible symptoms are related to reflux vs. Cannabinoids use if biopsies are negative              Cristofer Steel MD               [1]   Current Outpatient Medications   Medication Sig Dispense Refill    escitalopram (LEXAPRO) 20 mg tablet Take 20 mg by mouth every morning      hydrOXYzine pamoate (VISTARIL) 50 mg capsule       ondansetron (ZOFRAN) 4 mg tablet Take 1 tablet (4 mg total) by mouth every 8 (eight) hours as needed for nausea or vomiting 60 tablet 0    QUEtiapine (SEROquel) 50 mg tablet       traZODone (DESYREL) 50 mg tablet Take 2 tablets (100 mg total) by mouth daily at bedtime      triamcinolone (KENALOG) 0.1 % cream Apply topically 2 (two) times a day as needed for rash 45 g 1     No current facility-administered medications for this visit.

## 2025-07-22 NOTE — ASSESSMENT & PLAN NOTE
The patient has no complaints of heartburn or reflux.  Prior EGD 10/2023 revealed Schatzki's ring and small type I hiatal hernia.

## 2025-07-25 ENCOUNTER — HOSPITAL ENCOUNTER (EMERGENCY)
Facility: HOSPITAL | Age: 25
Discharge: HOME | End: 2025-07-25
Attending: EMERGENCY MEDICINE
Payer: COMMERCIAL

## 2025-07-25 VITALS
WEIGHT: 123 LBS | HEART RATE: 101 BPM | SYSTOLIC BLOOD PRESSURE: 152 MMHG | DIASTOLIC BLOOD PRESSURE: 96 MMHG | RESPIRATION RATE: 18 BRPM | OXYGEN SATURATION: 100 % | BODY MASS INDEX: 18.16 KG/M2 | TEMPERATURE: 99 F

## 2025-07-25 DIAGNOSIS — R11.10 VOMITING: Primary | ICD-10-CM

## 2025-07-25 LAB
ALBUMIN SERPL BCG-MCNC: 4.9 G/DL (ref 3.5–5)
ALP SERPL-CCNC: 58 U/L (ref 34–104)
ALT SERPL W P-5'-P-CCNC: 32 U/L (ref 7–52)
ANION GAP SERPL CALCULATED.3IONS-SCNC: 13 MMOL/L (ref 4–13)
AST SERPL W P-5'-P-CCNC: 21 U/L (ref 13–39)
BASOPHILS # BLD AUTO: 0.02 THOUSANDS/ÂΜL (ref 0–0.1)
BASOPHILS NFR BLD AUTO: 0 % (ref 0–1)
BILIRUB SERPL-MCNC: 1.69 MG/DL (ref 0.2–1)
BUN SERPL-MCNC: 14 MG/DL (ref 5–25)
CALCIUM SERPL-MCNC: 9.7 MG/DL (ref 8.4–10.2)
CHLORIDE SERPL-SCNC: 103 MMOL/L (ref 96–108)
CO2 SERPL-SCNC: 23 MMOL/L (ref 21–32)
CREAT SERPL-MCNC: 1.02 MG/DL (ref 0.6–1.3)
EOSINOPHIL # BLD AUTO: 0 THOUSAND/ÂΜL (ref 0–0.61)
EOSINOPHIL NFR BLD AUTO: 0 % (ref 0–6)
ERYTHROCYTE [DISTWIDTH] IN BLOOD BY AUTOMATED COUNT: 12.7 % (ref 11.6–15.1)
GFR SERPL CREATININE-BSD FRML MDRD: 102 ML/MIN/1.73SQ M
GLUCOSE SERPL-MCNC: 120 MG/DL (ref 65–140)
HCT VFR BLD AUTO: 42.1 % (ref 36.5–49.3)
HGB BLD-MCNC: 14 G/DL (ref 12–17)
IMM GRANULOCYTES # BLD AUTO: 0.03 THOUSAND/UL (ref 0–0.2)
IMM GRANULOCYTES NFR BLD AUTO: 0 % (ref 0–2)
LIPASE SERPL-CCNC: 7 U/L (ref 11–82)
LYMPHOCYTES # BLD AUTO: 0.27 THOUSANDS/ÂΜL (ref 0.6–4.47)
LYMPHOCYTES NFR BLD AUTO: 3 % (ref 14–44)
MCH RBC QN AUTO: 29.5 PG (ref 26.8–34.3)
MCHC RBC AUTO-ENTMCNC: 33.3 G/DL (ref 31.4–37.4)
MCV RBC AUTO: 89 FL (ref 82–98)
MONOCYTES # BLD AUTO: 0.37 THOUSAND/ÂΜL (ref 0.17–1.22)
MONOCYTES NFR BLD AUTO: 4 % (ref 4–12)
NEUTROPHILS # BLD AUTO: 8.7 THOUSANDS/ÂΜL (ref 1.85–7.62)
NEUTS SEG NFR BLD AUTO: 93 % (ref 43–75)
NRBC BLD AUTO-RTO: 0 /100 WBCS
PLATELET # BLD AUTO: 178 THOUSANDS/UL (ref 149–390)
PMV BLD AUTO: 10.2 FL (ref 8.9–12.7)
POTASSIUM SERPL-SCNC: 3.4 MMOL/L (ref 3.5–5.3)
PROT SERPL-MCNC: 7.5 G/DL (ref 6.4–8.4)
RBC # BLD AUTO: 4.75 MILLION/UL (ref 3.88–5.62)
SODIUM SERPL-SCNC: 139 MMOL/L (ref 135–147)
WBC # BLD AUTO: 9.39 THOUSAND/UL (ref 4.31–10.16)

## 2025-07-25 PROCEDURE — 99284 EMERGENCY DEPT VISIT MOD MDM: CPT

## 2025-07-25 PROCEDURE — 96361 HYDRATE IV INFUSION ADD-ON: CPT

## 2025-07-25 PROCEDURE — 96375 TX/PRO/DX INJ NEW DRUG ADDON: CPT

## 2025-07-25 PROCEDURE — 80053 COMPREHEN METABOLIC PANEL: CPT | Performed by: EMERGENCY MEDICINE

## 2025-07-25 PROCEDURE — 96374 THER/PROPH/DIAG INJ IV PUSH: CPT

## 2025-07-25 PROCEDURE — 85025 COMPLETE CBC W/AUTO DIFF WBC: CPT | Performed by: EMERGENCY MEDICINE

## 2025-07-25 PROCEDURE — 36415 COLL VENOUS BLD VENIPUNCTURE: CPT | Performed by: EMERGENCY MEDICINE

## 2025-07-25 PROCEDURE — 83690 ASSAY OF LIPASE: CPT | Performed by: EMERGENCY MEDICINE

## 2025-07-25 PROCEDURE — 99284 EMERGENCY DEPT VISIT MOD MDM: CPT | Performed by: EMERGENCY MEDICINE

## 2025-07-25 RX ORDER — POTASSIUM CHLORIDE 1500 MG/1
40 TABLET, EXTENDED RELEASE ORAL ONCE
Status: COMPLETED | OUTPATIENT
Start: 2025-07-25 | End: 2025-07-25

## 2025-07-25 RX ORDER — METOCLOPRAMIDE 10 MG/1
10 TABLET ORAL EVERY 6 HOURS PRN
Qty: 16 TABLET | Refills: 0 | Status: SHIPPED | OUTPATIENT
Start: 2025-07-25

## 2025-07-25 RX ORDER — LORAZEPAM 2 MG/ML
0.5 INJECTION INTRAMUSCULAR ONCE
Status: COMPLETED | OUTPATIENT
Start: 2025-07-25 | End: 2025-07-25

## 2025-07-25 RX ORDER — METOCLOPRAMIDE HYDROCHLORIDE 5 MG/ML
10 INJECTION INTRAMUSCULAR; INTRAVENOUS ONCE
Status: COMPLETED | OUTPATIENT
Start: 2025-07-25 | End: 2025-07-25

## 2025-07-25 RX ADMIN — POTASSIUM CHLORIDE 40 MEQ: 1500 TABLET, EXTENDED RELEASE ORAL at 17:42

## 2025-07-25 RX ADMIN — SODIUM CHLORIDE 1000 ML: 0.9 INJECTION, SOLUTION INTRAVENOUS at 16:11

## 2025-07-25 RX ADMIN — LORAZEPAM 0.5 MG: 2 INJECTION INTRAMUSCULAR; INTRAVENOUS at 16:45

## 2025-07-25 RX ADMIN — METOCLOPRAMIDE 10 MG: 5 INJECTION, SOLUTION INTRAMUSCULAR; INTRAVENOUS at 16:17

## 2025-07-25 NOTE — Clinical Note
Kirk Alexander was seen and treated in our emergency department on 7/25/2025.                Diagnosis:     Kirk  .    He may return on this date: 07/26/2025         If you have any questions or concerns, please don't hesitate to call.      Og Gabriel MD    ______________________________           _______________          _______________  Hospital Representative                              Date                                Time

## 2025-07-25 NOTE — ED PROVIDER NOTES
Time reflects when diagnosis was documented in both MDM as applicable and the Disposition within this note       Time User Action Codes Description Comment    7/25/2025  5:34 PM Og Gabriel Add [R11.10] Vomiting           ED Disposition       ED Disposition   Discharge    Condition   Stable    Date/Time   Fri Jul 25, 2025  5:34 PM    Comment   Kirk Alexander discharge to home/self care.                   Assessment & Plan       Medical Decision Making  Patient doing better after medications given here no leukocytosis no electrolyte abnormality except for very mild hypokalemia at 3.4 no gap acidosis patient presented with clinically some mild hyperventilation he got the Reglan here and he felt more anxious probably combination of his anxiety and a little akathisia he got Ativan and he is doing fine patient will be discharged on antiemetics to follow-up with GI abdomen is benign nonsurgical clinically not pancreatitis cholecystitis appendicitis still likely the vomiting use to secondary to cannabis use    Amount and/or Complexity of Data Reviewed  Labs: ordered.    Risk  Prescription drug management.             Medications   potassium chloride (Klor-Con M20) CR tablet 40 mEq (has no administration in time range)   sodium chloride 0.9 % bolus 1,000 mL (0 mL Intravenous Stopped 7/25/25 1715)   metoclopramide (REGLAN) injection 10 mg (10 mg Intravenous Given 7/25/25 1617)   LORazepam (ATIVAN) injection 0.5 mg (0.5 mg Intravenous Given 7/25/25 1645)       ED Risk Strat Scores                    No data recorded        SBIRT 22yo+      Flowsheet Row Most Recent Value   Initial Alcohol Screen: US AUDIT-C     1. How often do you have a drink containing alcohol? 0 Filed at: 07/25/2025 1620   2. How many drinks containing alcohol do you have on a typical day you are drinking?  0 Filed at: 07/25/2025 1620   3a. Male UNDER 65: How often do you have five or more drinks on one occasion? 0 Filed at: 07/25/2025 1620   3b. FEMALE  Any Age, or MALE 65+: How often do you have 4 or more drinks on one occassion? 0 Filed at: 07/25/2025 1620   Audit-C Score 0 Filed at: 07/25/2025 1620   SHAISTA: How many times in the past year have you...    Used an illegal drug or used a prescription medication for non-medical reasons? Never Filed at: 07/25/2025 1620                            History of Present Illness       Chief Complaint   Patient presents with    Vomiting     Vomiting and diarrhea since yesterday; family sick with same at home       Past Medical History[1]   Past Surgical History[2]   Family History[3]   Social History[4]   E-Cigarette/Vaping    E-Cigarette Use Never User       E-Cigarette/Vaping Substances    Nicotine No     THC No     CBD No     Flavoring No     Other No     Unknown No       I have reviewed and agree with the history as documented.     History of gastritis GERD has been told it was secondary to cannabis however he said he stopped for 6 months he was doing the problem so he restarted using his cannabis started having vomiting abdominal pain today millimeters by EMS has fever chest pain shortness of breath diarrhea he said he got a little tingling around his mouth and his hands after the pain and vomiting started      Vomiting  Associated symptoms: abdominal pain    Associated symptoms: no chills, no diarrhea and no fever        Review of Systems   Constitutional:  Negative for chills and fever.   Respiratory:  Negative for shortness of breath.    Cardiovascular:  Negative for chest pain.   Gastrointestinal:  Positive for abdominal pain and vomiting. Negative for diarrhea.   Skin:  Negative for color change and rash.   Neurological:  Negative for seizures, syncope, weakness and numbness.   Psychiatric/Behavioral:  Negative for confusion.    All other systems reviewed and are negative.          Objective       ED Triage Vitals [07/25/25 1548]   Temperature Pulse Blood Pressure Respirations SpO2 Patient Position - Orthostatic VS    99 °F (37.2 °C) 101 152/96 18 100 % --      Temp src Heart Rate Source BP Location FiO2 (%) Pain Score    -- -- -- -- --      Vitals      Date and Time Temp Pulse SpO2 Resp BP Pain Score FACES Pain Rating User   07/25/25 1548 99 °F (37.2 °C) 101 100 % 18 152/96 -- -- AM            Physical Exam  Vitals and nursing note reviewed.   Constitutional:       General: He is not in acute distress.     Appearance: He is well-developed. He is not ill-appearing, toxic-appearing or diaphoretic.   HENT:      Head: Normocephalic and atraumatic.      Mouth/Throat:      Mouth: Mucous membranes are moist.     Eyes:      Extraocular Movements: Extraocular movements intact.      Conjunctiva/sclera: Conjunctivae normal.       Cardiovascular:      Rate and Rhythm: Normal rate and regular rhythm.      Heart sounds: No murmur heard.  Pulmonary:      Effort: Pulmonary effort is normal. No respiratory distress.      Breath sounds: Normal breath sounds.   Abdominal:      General: There is no distension.      Palpations: Abdomen is soft. There is no mass.      Tenderness: There is no abdominal tenderness. There is no guarding or rebound.     Musculoskeletal:         General: No swelling.      Cervical back: Normal range of motion and neck supple.     Skin:     General: Skin is warm and dry.      Capillary Refill: Capillary refill takes less than 2 seconds.      Coloration: Skin is not jaundiced.     Neurological:      General: No focal deficit present.      Mental Status: He is alert.      Comments: oriented   Psychiatric:         Mood and Affect: Mood normal.         Results Reviewed       Procedure Component Value Units Date/Time    CBC and differential [[id number]]  (Normal) Collected: 07/25/25 1721    Lab Status: Preliminary result Specimen: Blood from Arm, Right Updated: 07/25/25 1728     WBC 9.39 Thousand/uL      RBC 4.75 Million/uL      Hemoglobin 14.0 g/dL      Hematocrit 42.1 %      MCV 89 fL      MCH 29.5 pg      MCHC 33.3 g/dL       RDW 12.7 %      MPV 10.2 fL      Platelets 178 Thousands/uL     Comprehensive metabolic panel [[id number]]  (Abnormal) Collected: 07/25/25 1701    Lab Status: Final result Specimen: Blood from Arm, Right Updated: 07/25/25 1723     Sodium 139 mmol/L      Potassium 3.4 mmol/L      Chloride 103 mmol/L      CO2 23 mmol/L      ANION GAP 13 mmol/L      BUN 14 mg/dL      Creatinine 1.02 mg/dL      Glucose 120 mg/dL      Calcium 9.7 mg/dL      AST 21 U/L      ALT 32 U/L      Alkaline Phosphatase 58 U/L      Total Protein 7.5 g/dL      Albumin 4.9 g/dL      Total Bilirubin 1.69 mg/dL      eGFR 102 ml/min/1.73sq m     Narrative:      [employer] guidelines for Chronic Kidney Disease (CKD):     Stage 1 with normal or high GFR (GFR > 90 mL/min/1.73 square meters)    Stage 2 Mild CKD (GFR = 60-89 mL/min/1.73 square meters)    Stage 3A Moderate CKD (GFR = 45-59 mL/min/1.73 square meters)    Stage 3B Moderate CKD (GFR = 30-44 mL/min/1.73 square meters)    Stage 4 Severe CKD (GFR = 15-29 mL/min/1.73 square meters)    Stage 5 End Stage CKD (GFR <15 mL/min/1.73 square meters)  Note: GFR calculation is accurate only with a steady state creatinine    Lipase [[id number]]  (Abnormal) Collected: 07/25/25 1701    Lab Status: Final result Specimen: Blood from Arm, Right Updated: 07/25/25 1723     Lipase 7 u/L             No orders to display       Procedures    ED Medication and Procedure Management   Prior to Admission Medications   Prescriptions Last Dose Informant Patient Reported? Taking?   QUEtiapine (SEROquel) 50 mg tablet  Self Yes No   escitalopram (LEXAPRO) 20 mg tablet  Self Yes No   Sig: Take 20 mg by mouth every morning   hydrOXYzine pamoate (VISTARIL) 50 mg capsule  Self Yes No   ondansetron (ZOFRAN) 4 mg tablet  Self No No   Sig: Take 1 tablet (4 mg total) by mouth every 8 (eight) hours as needed for nausea or vomiting   traZODone (DESYREL) 50 mg tablet  Self No No   Sig: Take 2 tablets (100 mg total) by  mouth daily at bedtime   triamcinolone (KENALOG) 0.1 % cream  Self No No   Sig: Apply topically 2 (two) times a day as needed for rash      Facility-Administered Medications: None     Patient's Medications   Discharge Prescriptions    METOCLOPRAMIDE (REGLAN) 10 MG TABLET    Take 1 tablet (10 mg total) by mouth every 6 (six) hours as needed (nausea vomiting)       Start Date: 7/25/2025 End Date: --       Order Dose: 10 mg       Quantity: 16 tablet    Refills: 0     No discharge procedures on file.  ED SEPSIS DOCUMENTATION   Time reflects when diagnosis was documented in both MDM as applicable and the Disposition within this note       Time User Action Codes Description Comment    7/25/2025  5:34 PM Og Gabriel Add [R11.10] Vomiting                      [1]   Past Medical History:  Diagnosis Date    Anxiety     GERD (gastroesophageal reflux disease)    [2] No past surgical history on file.  [3]   Family History  Problem Relation Name Age of Onset    Drug abuse Maternal Aunt      Alcohol abuse Maternal Aunt      Anxiety disorder Paternal Grandmother      Completed Suicide  Cousin     [4]   Social History  Tobacco Use    Smoking status: Never     Passive exposure: Never    Smokeless tobacco: Never   Vaping Use    Vaping status: Never Used   Substance Use Topics    Alcohol use: Not Currently    Drug use: Yes     Types: Marijuana     Comment: last time a month ago        Og Gabriel MD  07/25/25 3493

## 2025-07-31 ENCOUNTER — OFFICE VISIT (OUTPATIENT)
Dept: FAMILY MEDICINE CLINIC | Facility: CLINIC | Age: 25
End: 2025-07-31
Payer: COMMERCIAL

## 2025-07-31 VITALS
OXYGEN SATURATION: 99 % | BODY MASS INDEX: 17.42 KG/M2 | SYSTOLIC BLOOD PRESSURE: 108 MMHG | HEIGHT: 69 IN | WEIGHT: 117.6 LBS | HEART RATE: 65 BPM | DIASTOLIC BLOOD PRESSURE: 80 MMHG | TEMPERATURE: 97.9 F

## 2025-07-31 DIAGNOSIS — F33.2 MAJOR DEPRESSIVE DISORDER, RECURRENT SEVERE WITHOUT PSYCHOTIC FEATURES (HCC): ICD-10-CM

## 2025-07-31 DIAGNOSIS — E87.6 HYPOKALEMIA: Primary | ICD-10-CM

## 2025-07-31 DIAGNOSIS — F41.1 GENERALIZED ANXIETY DISORDER: ICD-10-CM

## 2025-07-31 PROCEDURE — 99214 OFFICE O/P EST MOD 30 MIN: CPT | Performed by: FAMILY MEDICINE

## 2025-07-31 RX ORDER — LORAZEPAM 1 MG/1
1 TABLET ORAL EVERY 8 HOURS PRN
Qty: 2 TABLET | Refills: 0 | Status: SHIPPED | OUTPATIENT
Start: 2025-07-31

## 2025-07-31 RX ORDER — NORTRIPTYLINE HYDROCHLORIDE 10 MG/1
10 CAPSULE ORAL
Start: 2025-07-31

## 2025-08-05 ENCOUNTER — TELEPHONE (OUTPATIENT)
Dept: GASTROENTEROLOGY | Facility: MEDICAL CENTER | Age: 25
End: 2025-08-05

## 2025-08-17 ENCOUNTER — OFFICE VISIT (OUTPATIENT)
Dept: URGENT CARE | Age: 25
End: 2025-08-17
Payer: COMMERCIAL

## 2025-08-17 VITALS
HEART RATE: 74 BPM | TEMPERATURE: 97.1 F | DIASTOLIC BLOOD PRESSURE: 71 MMHG | SYSTOLIC BLOOD PRESSURE: 111 MMHG | OXYGEN SATURATION: 96 % | RESPIRATION RATE: 20 BRPM

## 2025-08-17 DIAGNOSIS — K08.89 PAIN, DENTAL: Primary | ICD-10-CM

## 2025-08-17 PROCEDURE — 99213 OFFICE O/P EST LOW 20 MIN: CPT

## 2025-08-17 RX ORDER — SACCHAROMYCES BOULARDII 250 MG
250 CAPSULE ORAL 2 TIMES DAILY
Qty: 60 CAPSULE | Refills: 0 | Status: SHIPPED | OUTPATIENT
Start: 2025-08-17 | End: 2025-08-17

## 2025-08-17 RX ORDER — CHLORHEXIDINE GLUCONATE ORAL RINSE 1.2 MG/ML
15 SOLUTION DENTAL 2 TIMES DAILY
Qty: 120 ML | Refills: 0 | Status: SHIPPED | OUTPATIENT
Start: 2025-08-17 | End: 2025-08-17

## 2025-08-17 RX ORDER — CHLORHEXIDINE GLUCONATE ORAL RINSE 1.2 MG/ML
15 SOLUTION DENTAL 2 TIMES DAILY
Qty: 120 ML | Refills: 0 | Status: SHIPPED | OUTPATIENT
Start: 2025-08-17

## 2025-08-17 RX ORDER — CLINDAMYCIN HYDROCHLORIDE 150 MG/1
450 CAPSULE ORAL EVERY 8 HOURS SCHEDULED
Qty: 63 CAPSULE | Refills: 0 | Status: SHIPPED | OUTPATIENT
Start: 2025-08-17 | End: 2025-08-24

## 2025-08-17 RX ORDER — SACCHAROMYCES BOULARDII 250 MG
250 CAPSULE ORAL 2 TIMES DAILY
Qty: 60 CAPSULE | Refills: 0 | Status: SHIPPED | OUTPATIENT
Start: 2025-08-17

## 2025-08-17 RX ORDER — CLINDAMYCIN HYDROCHLORIDE 150 MG/1
450 CAPSULE ORAL EVERY 8 HOURS SCHEDULED
Qty: 63 CAPSULE | Refills: 0 | Status: SHIPPED | OUTPATIENT
Start: 2025-08-17 | End: 2025-08-17

## 2025-08-19 ENCOUNTER — HOSPITAL ENCOUNTER (OUTPATIENT)
Dept: RADIOLOGY | Facility: HOSPITAL | Age: 25
Discharge: HOME | End: 2025-08-19
Attending: FAMILY MEDICINE
Payer: COMMERCIAL

## 2025-08-19 DIAGNOSIS — R11.2 NAUSEA AND VOMITING, UNSPECIFIED VOMITING TYPE: ICD-10-CM

## 2025-08-19 PROCEDURE — A9541 TC99M SULFUR COLLOID: HCPCS

## 2025-08-19 PROCEDURE — 78264 GASTRIC EMPTYING IMG STUDY: CPT

## 2025-08-20 ENCOUNTER — TELEPHONE (OUTPATIENT)
Age: 25
End: 2025-08-20

## 2025-08-20 DIAGNOSIS — R10.10 PAIN OF UPPER ABDOMEN: Primary | ICD-10-CM
